# Patient Record
Sex: FEMALE | Race: WHITE | NOT HISPANIC OR LATINO | Employment: OTHER | URBAN - METROPOLITAN AREA
[De-identification: names, ages, dates, MRNs, and addresses within clinical notes are randomized per-mention and may not be internally consistent; named-entity substitution may affect disease eponyms.]

---

## 2017-02-13 DIAGNOSIS — F39 MOOD DISORDER (HCC): ICD-10-CM

## 2017-02-13 RX ORDER — CITALOPRAM 10 MG/1
TABLET ORAL
Qty: 30 TABLET | Refills: 0 | Status: SHIPPED | OUTPATIENT
Start: 2017-02-13 | End: 2017-03-13 | Stop reason: SDUPTHER

## 2017-03-13 DIAGNOSIS — F39 MOOD DISORDER (HCC): ICD-10-CM

## 2017-03-13 RX ORDER — CITALOPRAM 10 MG/1
TABLET ORAL
Qty: 30 TABLET | Refills: 0 | Status: SHIPPED | OUTPATIENT
Start: 2017-03-13 | End: 2017-04-10 | Stop reason: SDUPTHER

## 2017-04-10 ENCOUNTER — OFFICE VISIT (OUTPATIENT)
Dept: INTERNAL MEDICINE | Facility: CLINIC | Age: 77
End: 2017-04-10

## 2017-04-10 VITALS
BODY MASS INDEX: 21.83 KG/M2 | WEIGHT: 131 LBS | DIASTOLIC BLOOD PRESSURE: 84 MMHG | HEIGHT: 65 IN | SYSTOLIC BLOOD PRESSURE: 128 MMHG

## 2017-04-10 DIAGNOSIS — I10 ESSENTIAL HYPERTENSION: Primary | ICD-10-CM

## 2017-04-10 DIAGNOSIS — G89.29 CHRONIC PAIN OF RIGHT KNEE: ICD-10-CM

## 2017-04-10 DIAGNOSIS — E78.5 HYPERLIPIDEMIA, UNSPECIFIED HYPERLIPIDEMIA TYPE: ICD-10-CM

## 2017-04-10 DIAGNOSIS — M25.561 CHRONIC PAIN OF RIGHT KNEE: ICD-10-CM

## 2017-04-10 DIAGNOSIS — F39 MOOD DISORDER (HCC): ICD-10-CM

## 2017-04-10 DIAGNOSIS — F51.01 PRIMARY INSOMNIA: ICD-10-CM

## 2017-04-10 LAB
ALBUMIN SERPL-MCNC: 4.1 G/DL (ref 3.5–5.2)
ALBUMIN/GLOB SERPL: 1.3 G/DL
ALP SERPL-CCNC: 58 U/L (ref 39–117)
ALT SERPL-CCNC: 14 U/L (ref 1–33)
AST SERPL-CCNC: 22 U/L (ref 1–32)
BASOPHILS # BLD AUTO: 0.04 10*3/MM3 (ref 0–0.2)
BASOPHILS NFR BLD AUTO: 0.8 % (ref 0–2)
BILIRUB SERPL-MCNC: 0.8 MG/DL (ref 0.1–1.2)
BUN SERPL-MCNC: 20 MG/DL (ref 8–23)
BUN/CREAT SERPL: 26 (ref 7–25)
CALCIUM SERPL-MCNC: 10.2 MG/DL (ref 8.6–10.5)
CHLORIDE SERPL-SCNC: 99 MMOL/L (ref 98–107)
CHOLEST SERPL-MCNC: 239 MG/DL (ref 0–200)
CO2 SERPL-SCNC: 24.9 MMOL/L (ref 22–29)
CREAT SERPL-MCNC: 0.77 MG/DL (ref 0.57–1)
DEPRECATED RDW RBC AUTO: 41.9 FL (ref 37–54)
EOSINOPHIL # BLD AUTO: 0.14 10*3/MM3 (ref 0–0.7)
EOSINOPHIL NFR BLD AUTO: 3 % (ref 0–5)
ERYTHROCYTE [DISTWIDTH] IN BLOOD BY AUTOMATED COUNT: 12.2 % (ref 11.5–15)
GLOBULIN SER CALC-MCNC: 3.1 GM/DL
GLUCOSE SERPL-MCNC: 92 MG/DL (ref 65–99)
HCT VFR BLD AUTO: 39.4 % (ref 34.1–44.9)
HDLC SERPL-MCNC: 98 MG/DL (ref 40–60)
HGB BLD-MCNC: 12.9 G/DL (ref 11.2–15.7)
LDLC SERPL CALC-MCNC: 125 MG/DL (ref 0–100)
LYMPHOCYTES # BLD AUTO: 1.45 10*3/MM3 (ref 0.8–7)
LYMPHOCYTES NFR BLD AUTO: 30.7 % (ref 10–60)
MCH RBC QN AUTO: 31.5 PG (ref 26–34)
MCHC RBC AUTO-ENTMCNC: 32.7 G/DL (ref 31–37)
MCV RBC AUTO: 96.3 FL (ref 80–100)
MONOCYTES # BLD AUTO: 0.47 10*3/MM3 (ref 0–1)
MONOCYTES NFR BLD AUTO: 10 % (ref 0–13)
NEUTROPHILS # BLD AUTO: 2.62 10*3/MM3 (ref 1–11)
NEUTROPHILS NFR BLD AUTO: 55.5 % (ref 30–85)
PLATELET # BLD AUTO: 199 10*3/MM3 (ref 150–450)
PMV BLD AUTO: 12.8 FL (ref 6–12)
POTASSIUM SERPL-SCNC: 4.3 MMOL/L (ref 3.5–5.2)
PROT SERPL-MCNC: 7.2 G/DL (ref 6–8.5)
RBC # BLD AUTO: 4.09 10*6/MM3 (ref 3.93–5.22)
SODIUM SERPL-SCNC: 140 MMOL/L (ref 136–145)
TRIGL SERPL-MCNC: 78 MG/DL (ref 0–150)
VLDLC SERPL-MCNC: 15.6 MG/DL (ref 5–40)
WBC NRBC COR # BLD: 4.72 10*3/MM3 (ref 5–10)

## 2017-04-10 PROCEDURE — 99213 OFFICE O/P EST LOW 20 MIN: CPT | Performed by: INTERNAL MEDICINE

## 2017-04-10 PROCEDURE — 85025 COMPLETE CBC W/AUTO DIFF WBC: CPT | Performed by: INTERNAL MEDICINE

## 2017-04-10 RX ORDER — ACETAMINOPHEN 325 MG/1
650 TABLET ORAL EVERY 6 HOURS PRN
COMMUNITY
End: 2019-03-21

## 2017-04-10 RX ORDER — CITALOPRAM 10 MG/1
10 TABLET ORAL DAILY
Qty: 90 TABLET | Refills: 3 | Status: SHIPPED | OUTPATIENT
Start: 2017-04-10 | End: 2017-05-11 | Stop reason: SDUPTHER

## 2017-04-10 RX ORDER — IBUPROFEN 200 MG
200 TABLET ORAL EVERY 6 HOURS PRN
COMMUNITY
End: 2019-04-11 | Stop reason: HOSPADM

## 2017-04-10 NOTE — PROGRESS NOTES
"Subjective   Kristie Franz is a 76 y.o. female here for   Chief Complaint   Patient presents with   • Hyperlipidemia     8 month follow-up   • Hypertension     8 month follow-up   .    Vitals:    04/10/17 0901   BP: 128/84   BP Location: Left arm   Patient Position: Sitting   Cuff Size: Adult   Weight: 131 lb (59.4 kg)   Height: 65\" (165.1 cm)       Hyperlipidemia   This is a chronic problem. The current episode started more than 1 year ago. The problem is controlled. Recent lipid tests were reviewed and are normal. She has no history of chronic renal disease, diabetes or liver disease. Pertinent negatives include no chest pain or shortness of breath.   Hypertension   This is a chronic problem. The current episode started more than 1 year ago. The problem is unchanged. The problem is controlled. Pertinent negatives include no chest pain, palpitations, peripheral edema or shortness of breath. There is no history of chronic renal disease.        Encounter Diagnoses   Name Primary?   • Essential hypertension Yes   • Hyperlipidemia, unspecified hyperlipidemia type    • Primary insomnia    • Mood disorder    • Chronic pain of right knee        The following portions of the patient's history were reviewed and updated as appropriate: allergies, current medications, past social history and problem list.    Review of Systems   Constitutional: Positive for fatigue (off/on). Negative for chills and fever.   HENT: Positive for postnasal drip.    Respiratory: Negative for cough, shortness of breath and wheezing.    Cardiovascular: Negative for chest pain, palpitations and leg swelling.   Musculoskeletal: Positive for arthralgias (right knee pain - had shots per ortho).   Allergic/Immunologic: Positive for environmental allergies.   Psychiatric/Behavioral: Positive for sleep disturbance. Negative for dysphoric mood. The patient is not nervous/anxious.        Objective   Physical Exam   Constitutional: She appears well-developed " and well-nourished. No distress.   Cardiovascular: Normal rate, regular rhythm and normal heart sounds.    Pulmonary/Chest: No respiratory distress. She has no wheezes. She has no rales. She exhibits no tenderness.   Musculoskeletal: She exhibits no edema.   Psychiatric: She has a normal mood and affect. Her behavior is normal.   Nursing note and vitals reviewed.      Assessment/Plan   Problem List Items Addressed This Visit        Unprioritized    Hypertension - Primary    Relevant Orders    CBC Auto Differential    Comprehensive Metabolic Panel    Lipid Panel    Hyperlipidemia    Relevant Orders    Lipid Panel    Mood disorder    Relevant Medications    citalopram (CeleXA) 10 MG tablet    Insomnia    Chronic pain of right knee       HTN - need to bring machine to each appt.  Call if bp less than 110 systolic.   High chol - continue diet/exercise.   Knee exercises shown today.  Need followup with ortho.   Mood stable.  Call if problems.       Return for wellness in 3-4 mos.

## 2017-05-11 ENCOUNTER — TELEPHONE (OUTPATIENT)
Dept: INTERNAL MEDICINE | Facility: CLINIC | Age: 77
End: 2017-05-11

## 2017-05-11 DIAGNOSIS — F39 MOOD DISORDER (HCC): ICD-10-CM

## 2017-05-11 RX ORDER — CITALOPRAM 10 MG/1
10 TABLET ORAL DAILY
Qty: 90 TABLET | Refills: 3 | Status: SHIPPED | OUTPATIENT
Start: 2017-05-11 | End: 2018-03-20 | Stop reason: SDUPTHER

## 2017-05-19 ENCOUNTER — TELEPHONE (OUTPATIENT)
Dept: INTERNAL MEDICINE | Facility: CLINIC | Age: 77
End: 2017-05-19

## 2017-06-12 ENCOUNTER — TRANSCRIBE ORDERS (OUTPATIENT)
Dept: ADMINISTRATIVE | Facility: HOSPITAL | Age: 77
End: 2017-06-12

## 2017-06-12 DIAGNOSIS — Z13.9 SCREENING: Primary | ICD-10-CM

## 2017-07-06 ENCOUNTER — HOSPITAL ENCOUNTER (OUTPATIENT)
Dept: CARDIOLOGY | Facility: HOSPITAL | Age: 77
Discharge: HOME | End: 2017-07-06
Attending: INTERNAL MEDICINE | Admitting: INTERNAL MEDICINE

## 2017-07-06 VITALS
SYSTOLIC BLOOD PRESSURE: 146 MMHG | WEIGHT: 132 LBS | HEIGHT: 62 IN | DIASTOLIC BLOOD PRESSURE: 78 MMHG | HEART RATE: 56 BPM | BODY MASS INDEX: 24.29 KG/M2

## 2017-07-06 DIAGNOSIS — Z13.9 SCREENING: ICD-10-CM

## 2017-07-06 LAB
BH CV ECHO MEAS - DIST AO DIAM: 1.48 CM
BH CV VAS BP LEFT ARM: NORMAL MMHG
BH CV VAS BP RIGHT ARM: NORMAL MMHG
BH CV XLRA MEAS - MID AO DIAM: 1.79 CM
BH CV XLRA MEAS - PROX AO DIAM: 2.04 CM
BH CV XLRA MEAS LEFT ICA/CCA RATIO: 1.26
BH CV XLRA MEAS LEFT MID CCA PSV: NORMAL CM/SEC
BH CV XLRA MEAS LEFT MID ICA PSV: NORMAL CM/SEC
BH CV XLRA MEAS LEFT PROX ECA PSV: NORMAL CM/SEC
BH CV XLRA MEAS RIGHT ICA/CCA RATIO: 1.87
BH CV XLRA MEAS RIGHT MID CCA PSV: NORMAL CM/SEC
BH CV XLRA MEAS RIGHT MID ICA PSV: NORMAL CM/SEC
BH CV XLRA MEAS RIGHT PROX ECA PSV: NORMAL CM/SEC

## 2017-07-06 PROCEDURE — 93799 UNLISTED CV SVC/PROCEDURE: CPT

## 2017-08-24 ENCOUNTER — OFFICE VISIT (OUTPATIENT)
Dept: INTERNAL MEDICINE | Facility: CLINIC | Age: 77
End: 2017-08-24

## 2017-08-24 VITALS
WEIGHT: 133 LBS | DIASTOLIC BLOOD PRESSURE: 66 MMHG | SYSTOLIC BLOOD PRESSURE: 114 MMHG | HEIGHT: 62 IN | BODY MASS INDEX: 24.48 KG/M2

## 2017-08-24 DIAGNOSIS — Z00.00 MEDICARE ANNUAL WELLNESS VISIT, SUBSEQUENT: Primary | ICD-10-CM

## 2017-08-24 DIAGNOSIS — E78.5 HYPERLIPIDEMIA, UNSPECIFIED HYPERLIPIDEMIA TYPE: ICD-10-CM

## 2017-08-24 DIAGNOSIS — F39 MOOD DISORDER (HCC): ICD-10-CM

## 2017-08-24 DIAGNOSIS — M85.80 OSTEOPENIA: ICD-10-CM

## 2017-08-24 DIAGNOSIS — F51.01 PRIMARY INSOMNIA: ICD-10-CM

## 2017-08-24 DIAGNOSIS — J30.2 SEASONAL ALLERGIC RHINITIS, UNSPECIFIED ALLERGIC RHINITIS TRIGGER: ICD-10-CM

## 2017-08-24 DIAGNOSIS — I10 ESSENTIAL HYPERTENSION: ICD-10-CM

## 2017-08-24 PROCEDURE — 99213 OFFICE O/P EST LOW 20 MIN: CPT | Performed by: INTERNAL MEDICINE

## 2017-08-24 PROCEDURE — G0439 PPPS, SUBSEQ VISIT: HCPCS | Performed by: INTERNAL MEDICINE

## 2017-08-24 RX ORDER — TRIAMTERENE AND HYDROCHLOROTHIAZIDE 37.5; 25 MG/1; MG/1
1 TABLET ORAL DAILY
Qty: 90 TABLET | Refills: 1 | Status: SHIPPED | OUTPATIENT
Start: 2017-08-24 | End: 2018-08-23 | Stop reason: SDUPTHER

## 2017-08-24 NOTE — PROGRESS NOTES
"Subjective   Kristie Franz is a 76 y.o. female here for   Chief Complaint   Patient presents with   • Annual Exam     subsequent wellness exam   • Hypertension   • Hyperlipidemia   .    Vitals:    08/24/17 1416   BP: 114/66   Weight: 133 lb (60.3 kg)   Height: 62\" (157.5 cm)       Hypertension   This is a chronic problem. The current episode started more than 1 year ago. The problem is unchanged. The problem is controlled. Associated symptoms include anxiety and malaise/fatigue. Pertinent negatives include no chest pain, palpitations, peripheral edema or shortness of breath. There is no history of chronic renal disease.   Hyperlipidemia   This is a chronic problem. The current episode started more than 1 year ago. The problem is controlled. Recent lipid tests were reviewed and are normal. She has no history of chronic renal disease, diabetes, hypothyroidism, liver disease or obesity. Pertinent negatives include no chest pain or shortness of breath.        Encounter Diagnoses   Name Primary?   • Medicare annual wellness visit, subsequent Yes   • Hyperlipidemia, unspecified hyperlipidemia type    • Essential hypertension    • Mood disorder    • Primary insomnia    • Osteopenia    • Seasonal allergic rhinitis, unspecified allergic rhinitis trigger        The following portions of the patient's history were reviewed and updated as appropriate: allergies, current medications, past social history and problem list.    Review of Systems   Constitutional: Positive for fatigue and malaise/fatigue. Negative for chills and fever.   Respiratory: Negative for cough, shortness of breath and wheezing.    Cardiovascular: Negative for chest pain, palpitations and leg swelling.   Psychiatric/Behavioral: Positive for dysphoric mood and sleep disturbance (wakes up frequently ). The patient is nervous/anxious.        Objective   Physical Exam   Constitutional: She appears well-developed and well-nourished. No distress.   Cardiovascular: " Normal rate, regular rhythm and normal heart sounds.    Pulmonary/Chest: No respiratory distress. She has no wheezes. She has no rales. She exhibits no tenderness.   Musculoskeletal: She exhibits no edema.   Psychiatric: She has a normal mood and affect. Her behavior is normal.   Nursing note and vitals reviewed.      Assessment/Plan   Problem List Items Addressed This Visit        Unprioritized    Hypertension    Relevant Medications    triamterene-hydrochlorothiazide (MAXZIDE-25) 37.5-25 MG per tablet    Hyperlipidemia    Mood disorder    Allergic rhinitis    Osteopenia    Insomnia      Other Visit Diagnoses     Medicare annual wellness visit, subsequent    -  Primary         HTN stable - she wants to continue diuretic b/c leg swelling if she stops maxzide.  Ok to use maxzide qod.  Need yearly labs.   High chol - improved 4 mos ago - rechk 6 mos (on no meds).   Mood - grieving 's death.  She declines change in celexa.   Vit D=39 in the past.   Osteopenia - need bone density q 2 yrs.   Insomnia - ok with otc medication.  Call if worsening.     Wellness today.  Need Tdap if not done elsewhere (she is going to chk her records).   Need daily strengthening & balance exercises (shown today).   Need screening for AAA & carotid disease every other year (normal results last month).

## 2017-08-24 NOTE — PATIENT INSTRUCTIONS
Medicare Wellness  Personal Prevention Plan of Service     Date of Office Visit:  2017  Encounter Provider:  Marisol Broussard MD  Place of Service:  [facility]  Patient Name: Kristie Franz  :  1940    As part of the Medicare Wellness portion of your visit today, we are providing you with this personalized preventive plan of services (PPPS). This plan is based upon recommendations of the [place] Preventive Services Task Force (USPSTF) and the Advisory Committee on Immunization Practices (ACIP).    This lists the preventive care services that should be considered, and provides dates of when you are due. Items listed as completed are up-to-date and do not require any further intervention.    Health Maintenance   Topic Date Due   • TDAP/TD VACCINES (1 - Tdap) 1959   • INFLUENZA VACCINE  2017   • DXA SCAN  10/08/2017   • LIPID PANEL  04/10/2018   • MEDICARE ANNUAL WELLNESS  2018   • MAMMOGRAM  10/13/2018   • COLONOSCOPY  2019   • PNEUMOCOCCAL VACCINES (65+ LOW/MEDIUM RISK)  Completed   • ZOSTER VACCINE  Completed       No orders of the defined types were placed in this encounter.      No Follow-up on file.

## 2017-08-24 NOTE — PROGRESS NOTES
QUICK REFERENCE INFORMATION:  The ABCs of the Annual Wellness Visit    Subsequent Medicare Wellness Visit    HEALTH RISK ASSESSMENT    1940    Recent Hospitalizations:  No hospitalization(s) within the last year..        Current Medical Providers:  Patient Care Team:  Marisol Broussard MD as PCP - General (Internal Medicine)  Marisol Broussard MD as PCP - Claims Attributed  Kristie Wallace MD (Dermatology)  Gennaro Yeung MD as Consulting Physician (Orthopedic Surgery)        Smoking Status:  History   Smoking Status   • Never Smoker   Smokeless Tobacco   • Never Used       Alcohol Consumption:  History   Alcohol Use   • Yes       Depression Screen:   PHQ-9 Depression Screening 8/24/2017   Little interest or pleasure in doing things (No Data)   Feeling down, depressed, or hopeless 1   Trouble falling or staying asleep, or sleeping too much 1   Feeling tired or having little energy 1   Poor appetite or overeating 0   Feeling bad about yourself - or that you are a failure or have let yourself or your family down 1   Trouble concentrating on things, such as reading the newspaper or watching television 1   Moving or speaking so slowly that other people could have noticed. Or the opposite - being so fidgety or restless that you have been moving around a lot more than usual 0   Thoughts that you would be better off dead, or of hurting yourself in some way 0   PHQ-9 Total Score 5   If you checked off any problems, how difficult have these problems made it for you to do your work, take care of things at home, or get along with other people? Somewhat difficult       Health Habits and Functional and Cognitive Screening:  Functional & Cognitive Status 8/24/2017   Do you have difficulty preparing food and eating? No   Do you have difficulty bathing yourself? No   Do you have difficulty getting dressed? No   Do you have difficulty using the toilet? No   Do you have difficulty moving around from place to place?  No   In the past year have you fallen or experienced a near fall? No   Do you need help using the phone?  No   Are you deaf or do you have serious difficulty hearing?  No   Do you need help with transportation? No   Do you need help shopping? No   Do you need help preparing meals?  No   Do you need help with housework?  No   Do you need help with laundry? No   Do you need help taking your medications? No   Do you need help managing money? No   Do you have difficulty concentrating, remembering or making decisions? No       Health Habits  Current Diet: Well Balanced Diet  Dental Exam: Up to date  Eye Exam: Up to date  Exercise (times per week): 3 times per week  Current Exercise Activities Include: Walking      Does the patient have evidence of cognitive impairment? No    Aspirin use counseling: Does not need ASA (and currently is not on it)      Recent Lab Results:  CMP:  Lab Results   Component Value Date    GLU 92 04/10/2017    BUN 20 04/10/2017    CREATININE 0.77 04/10/2017    EGFRIFNONA 73 04/10/2017    EGFRIFAFRI 88 04/10/2017    BCR 26.0 (H) 04/10/2017     04/10/2017    K 4.3 04/10/2017    CO2 24.9 04/10/2017    CALCIUM 10.2 04/10/2017    PROTENTOTREF 7.2 04/10/2017    ALBUMIN 4.10 04/10/2017    LABGLOBREF 3.1 04/10/2017    LABIL2 1.3 04/10/2017    BILITOT 0.8 04/10/2017    ALKPHOS 58 04/10/2017    AST 22 04/10/2017    ALT 14 04/10/2017     Lipid Panel:  Lab Results   Component Value Date    CHOL 250 (H) 10/13/2016    TRIG 78 04/10/2017    HDL 98 (H) 04/10/2017    VLDL 15.6 04/10/2017    LDLCALC 137 (H) 10/13/2016    LDLHDL 1.35 10/13/2016     HbA1c:       Visual Acuity:  No exam data present    Age-appropriate Screening Schedule:  Refer to the list below for future screening recommendations based on patient's age, sex and/or medical conditions. Orders for these recommended tests are listed in the plan section. The patient has been provided with a written plan.    Health Maintenance   Topic Date Due   •  TDAP/TD VACCINES (1 - Tdap) 12/20/1959   • INFLUENZA VACCINE  09/01/2017   • DXA SCAN  10/08/2017   • LIPID PANEL  04/10/2018   • MAMMOGRAM  10/13/2018   • COLONOSCOPY  02/02/2019   • PNEUMOCOCCAL VACCINES (65+ LOW/MEDIUM RISK)  Completed   • ZOSTER VACCINE  Completed        Subjective   History of Present Illness    Kristie Franz is a 76 y.o. female who presents for an Subsequent Wellness Visit.    The following portions of the patient's history were reviewed and updated as appropriate: allergies, current medications, past family history, past medical history, past social history, past surgical history and problem list.    Outpatient Medications Prior to Visit   Medication Sig Dispense Refill   • acetaminophen (TYLENOL) 325 MG tablet Take 650 mg by mouth Every 6 (Six) Hours As Needed for Mild Pain (1-3).     • BIOTIN 5000 PO Take  by mouth.     • calcium carbonate (OS-CHARLIE) 600 MG tablet Take 600 mg by mouth daily.     • cholecalciferol (VITAMIN D3) 1000 UNITS tablet Take 1,000 Units by mouth daily.     • citalopram (CeleXA) 10 MG tablet Take 1 tablet by mouth Daily. 90 tablet 3   • guaiFENesin (MUCINEX) 600 MG 12 hr tablet Take 1,200 mg by mouth 2 (two) times a day.     • ibuprofen (ADVIL,MOTRIN) 200 MG tablet Take 200 mg by mouth Every 6 (Six) Hours As Needed for Mild Pain (1-3).     • Multiple Vitamin (MULTI VITAMIN DAILY PO) Take 1 tablet by mouth Daily.     • Omega-3 Fatty Acids (FISH OIL) 1000 MG capsule capsule Take  by mouth daily with breakfast.     • rizatriptan MLT (MAXALT-MLT) 10 MG disintegrating tablet Take by mouth once as needed for migraine. May repeat in 2 hours if needed     • Travoprost (TRAVATAN Z OP) Apply 1 drop to eye Daily.     • triamterene-hydrochlorothiazide (MAXZIDE-25) 37.5-25 MG per tablet TAKE ONE TABLET BY MOUTH DAILY 90 tablet 1     No facility-administered medications prior to visit.        Patient Active Problem List   Diagnosis   • Hypertension   • Hyperlipidemia   • Mood  "disorder   • Allergic rhinitis   • Osteopenia   • Insomnia   • Chronic pain of right knee       Advance Care Planning:  has NO advance directive - information provided to the patient today    Identification of Risk Factors:  Risk factors include: increased fall risk.    Review of Systems    Compared to one year ago, the patient feels her physical health is the same.  Compared to one year ago, the patient feels her mental health is the same.    Objective     Physical Exam    Vitals:    08/24/17 1416   BP: 114/66   Weight: 133 lb (60.3 kg)   Height: 62\" (157.5 cm)       Body mass index is 24.33 kg/(m^2).  Discussed the patient's BMI with her. The BMI is in the acceptable range.    Assessment/Plan   Patient Self-Management and Personalized Health Advice  The patient has been provided with information about: diet, exercise, weight management, prevention of cardiac or vascular disease, the relationship between weight and GERD, fall prevention, designing advance directives and mental health concerns and preventive services including:   · Advance directive, Counseling for cardiovascular disease risk reduction, Diabetes screening, see lab orders, Exercise counseling provided, Fall Risk assessment done, Fall Risk plan of care done, Nutrition counseling provided.    Visit Diagnoses:    ICD-10-CM ICD-9-CM   1. Medicare annual wellness visit, subsequent Z00.00 V70.0   2. Hyperlipidemia, unspecified hyperlipidemia type E78.5 272.4   3. Essential hypertension I10 401.9   4. Mood disorder F39 296.90   5. Primary insomnia F51.01 307.42   6. Osteopenia M85.80 733.90   7. Seasonal allergic rhinitis, unspecified allergic rhinitis trigger J30.2 477.9       No orders of the defined types were placed in this encounter.      Outpatient Encounter Prescriptions as of 8/24/2017   Medication Sig Dispense Refill   • acetaminophen (TYLENOL) 325 MG tablet Take 650 mg by mouth Every 6 (Six) Hours As Needed for Mild Pain (1-3).     • BIOTIN 5000 " PO Take  by mouth.     • calcium carbonate (OS-CHARLIE) 600 MG tablet Take 600 mg by mouth daily.     • cholecalciferol (VITAMIN D3) 1000 UNITS tablet Take 1,000 Units by mouth daily.     • citalopram (CeleXA) 10 MG tablet Take 1 tablet by mouth Daily. 90 tablet 3   • guaiFENesin (MUCINEX) 600 MG 12 hr tablet Take 1,200 mg by mouth 2 (two) times a day.     • ibuprofen (ADVIL,MOTRIN) 200 MG tablet Take 200 mg by mouth Every 6 (Six) Hours As Needed for Mild Pain (1-3).     • Multiple Vitamin (MULTI VITAMIN DAILY PO) Take 1 tablet by mouth Daily.     • Omega-3 Fatty Acids (FISH OIL) 1000 MG capsule capsule Take  by mouth daily with breakfast.     • rizatriptan MLT (MAXALT-MLT) 10 MG disintegrating tablet Take by mouth once as needed for migraine. May repeat in 2 hours if needed     • Travoprost (TRAVATAN Z OP) Apply 1 drop to eye Daily.     • triamterene-hydrochlorothiazide (MAXZIDE-25) 37.5-25 MG per tablet TAKE ONE TABLET BY MOUTH DAILY 90 tablet 1     No facility-administered encounter medications on file as of 8/24/2017.        Reviewed use of high risk medication in the elderly: yes  Reviewed for potential of harmful drug interactions in the elderly: yes    Follow Up:  Return in about 6 months (around 2/24/2018) for Recheck.     An After Visit Summary and PPPS with all of these plans were given to the patient.       Need to avoid dehydration (taking diuretic).

## 2017-08-28 ENCOUNTER — TELEPHONE (OUTPATIENT)
Dept: INTERNAL MEDICINE | Facility: CLINIC | Age: 77
End: 2017-08-28

## 2017-08-28 NOTE — TELEPHONE ENCOUNTER
----- Message from Seda Cao sent at 8/28/2017  1:38 PM EDT -----  Contact: pt - Dr Broussard's pt - RE: vacc  Pt calling and would like to inform that pt rec'd Tdap @ [facility] 10/04/2015 and Prevnar 13 / pneumonia was rec'd in our office 11/01/2016. Could you please document pt's chart accordingly? Please advise. Thanks      Pt # [phone]

## 2017-10-20 DIAGNOSIS — Z13.820 SCREENING FOR OSTEOPOROSIS: ICD-10-CM

## 2017-10-20 DIAGNOSIS — Z12.31 ENCOUNTER FOR SCREENING MAMMOGRAM FOR BREAST CANCER: Primary | ICD-10-CM

## 2017-10-20 DIAGNOSIS — Z78.0 POST-MENOPAUSAL: ICD-10-CM

## 2017-10-24 ENCOUNTER — OFFICE VISIT (OUTPATIENT)
Dept: INTERNAL MEDICINE | Facility: CLINIC | Age: 77
End: 2017-10-24

## 2017-10-24 ENCOUNTER — APPOINTMENT (OUTPATIENT)
Dept: WOMENS IMAGING | Facility: HOSPITAL | Age: 77
End: 2017-10-24

## 2017-10-24 ENCOUNTER — CLINICAL SUPPORT (OUTPATIENT)
Dept: INTERNAL MEDICINE | Facility: CLINIC | Age: 77
End: 2017-10-24

## 2017-10-24 DIAGNOSIS — Z78.0 POST-MENOPAUSAL: ICD-10-CM

## 2017-10-24 DIAGNOSIS — Z12.31 ENCOUNTER FOR SCREENING MAMMOGRAM FOR BREAST CANCER: ICD-10-CM

## 2017-10-24 DIAGNOSIS — Z23 NEED FOR IMMUNIZATION AGAINST INFLUENZA: Primary | ICD-10-CM

## 2017-10-24 PROCEDURE — G0202 SCR MAMMO BI INCL CAD: HCPCS | Performed by: INTERNAL MEDICINE

## 2017-10-24 PROCEDURE — 90662 IIV NO PRSV INCREASED AG IM: CPT | Performed by: INTERNAL MEDICINE

## 2017-10-24 PROCEDURE — G0008 ADMIN INFLUENZA VIRUS VAC: HCPCS | Performed by: INTERNAL MEDICINE

## 2017-10-24 PROCEDURE — 77080 DXA BONE DENSITY AXIAL: CPT | Performed by: INTERNAL MEDICINE

## 2017-10-24 PROCEDURE — G0202 SCR MAMMO BI INCL CAD: HCPCS | Performed by: RADIOLOGY

## 2017-11-08 ENCOUNTER — APPOINTMENT (OUTPATIENT)
Dept: WOMENS IMAGING | Facility: HOSPITAL | Age: 77
End: 2017-11-08

## 2017-11-08 ENCOUNTER — OFFICE VISIT (OUTPATIENT)
Dept: INTERNAL MEDICINE | Facility: CLINIC | Age: 77
End: 2017-11-08

## 2017-11-08 VITALS
OXYGEN SATURATION: 96 % | HEART RATE: 73 BPM | DIASTOLIC BLOOD PRESSURE: 80 MMHG | WEIGHT: 124 LBS | SYSTOLIC BLOOD PRESSURE: 118 MMHG | BODY MASS INDEX: 22.68 KG/M2

## 2017-11-08 DIAGNOSIS — M25.562 ACUTE PAIN OF LEFT KNEE: Primary | ICD-10-CM

## 2017-11-08 PROCEDURE — 73560 X-RAY EXAM OF KNEE 1 OR 2: CPT | Performed by: RADIOLOGY

## 2017-11-08 PROCEDURE — 73560 X-RAY EXAM OF KNEE 1 OR 2: CPT | Performed by: NURSE PRACTITIONER

## 2017-11-08 PROCEDURE — 99213 OFFICE O/P EST LOW 20 MIN: CPT | Performed by: NURSE PRACTITIONER

## 2017-11-08 NOTE — PATIENT INSTRUCTIONS
Knee Pain  Knee pain is a very common symptom and can have many causes. Knee pain often goes away when you follow your health care provider's instructions for relieving pain and discomfort at home. However, knee pain can develop into a condition that needs treatment. Some conditions may include:  · Arthritis caused by wear and tear (osteoarthritis).  · Arthritis caused by swelling and irritation (rheumatoid arthritis or gout).  · A cyst or growth in your knee.  · An infection in your knee joint.  · An injury that will not heal.  · Damage, swelling, or irritation of the tissues that support your knee (torn ligaments or tendinitis).  If your knee pain continues, additional tests may be ordered to diagnose your condition. Tests may include X-rays or other imaging studies of your knee. You may also need to have fluid removed from your knee. Treatment for ongoing knee pain depends on the cause, but treatment may include:  · Medicines to relieve pain or swelling.  · Steroid injections in your knee.  · Physical therapy.  · Surgery.  HOME CARE INSTRUCTIONS  · Take medicines only as directed by your health care provider.  · Rest your knee and keep it raised (elevated) while you are resting.  · Do not do things that cause or worsen pain.  · Avoid high-impact activities or exercises, such as running, jumping rope, or doing jumping jacks.  · Apply ice to the knee area:    Put ice in a plastic bag.    Place a towel between your skin and the bag.    Leave the ice on for 20 minutes, 2-3 times a day.  · Ask your health care provider if you should wear an elastic knee support.  · Keep a pillow under your knee when you sleep.  · Lose weight if you are overweight. Extra weight can put pressure on your knee.  · Do not use any tobacco products, including cigarettes, chewing tobacco, or electronic cigarettes. If you need help quitting, ask your health care provider. Smoking may slow the healing of any bone and joint problems that you may  have.  SEEK MEDICAL CARE IF:  · Your knee pain continues, changes, or gets worse.  · You have a fever along with knee pain.  · Your knee sergio or locks up.  · Your knee becomes more swollen.  SEEK IMMEDIATE MEDICAL CARE IF:   · Your knee joint feels hot to the touch.  · You have chest pain or trouble breathing.     This information is not intended to replace advice given to you by your health care provider. Make sure you discuss any questions you have with your health care provider.     Document Released: 10/14/2008 Document Revised: 01/08/2016 Document Reviewed: 08/03/2015  [employer] Interactive Patient Education ©2017 [employer]

## 2017-11-08 NOTE — PROGRESS NOTES
Subjective   Kristie Franz is a 76 y.o. female.     HPI Comments: She was at yoga last week, which she suspects caused her pain. She was doing more aggressive stretches on her knees.     Leg Pain    The incident occurred 5 to 7 days ago. The injury mechanism is unknown. The pain is present in the left knee (left shin ). The quality of the pain is described as aching. The pain is at a severity of 8/10. The pain is moderate. The pain has been fluctuating since onset. Pertinent negatives include no numbness or tingling. The symptoms are aggravated by movement (going up stairs, bending down ). She has tried ice, heat, NSAIDs and acetaminophen for the symptoms. The treatment provided moderate relief.        The following portions of the patient's history were reviewed and updated as appropriate: allergies, current medications, past family history, past medical history, past social history, past surgical history and problem list.    Review of Systems   Constitutional: Negative for activity change, appetite change, fatigue and fever.   Respiratory: Negative for cough.    Cardiovascular: Negative for chest pain and leg swelling.   Musculoskeletal: Positive for arthralgias (left knee, acute/right knee, chronic ) and back pain (lower back, chronic ). Negative for joint swelling.   Neurological: Negative for tingling and numbness.       Objective   Physical Exam   Constitutional: She is oriented to person, place, and time. She appears well-developed and well-nourished.   HENT:   Head: Normocephalic.   Nose: Nose normal.   Cardiovascular: Regular rhythm and normal heart sounds.  Exam reveals no S3 and no S4.    No murmur heard.  Pulmonary/Chest: Effort normal and breath sounds normal. She has no decreased breath sounds. She has no wheezes. She has no rhonchi. She has no rales.   Musculoskeletal: She exhibits no edema.        Right knee: She exhibits decreased range of motion. She exhibits no swelling. Tenderness found.         Left knee: She exhibits decreased range of motion. She exhibits no swelling and no erythema. Tenderness found. Medial joint line tenderness noted.   Pain with knee flexion    Neurological: She is alert and oriented to person, place, and time. Gait normal.   Reflex Scores:       Patellar reflexes are 2+ on the right side and 2+ on the left side.  Skin: Skin is warm and dry.   Psychiatric: She has a normal mood and affect.       Assessment/Plan   Kristie was seen today for leg pain.    Diagnoses and all orders for this visit:    Acute pain of left knee  Comments:  apply ice and take ibuprofen; apply knee brace   Orders:  -     XR Knee AP & Lateral  -     Ambulatory Referral to Physical Therapy Evaluate and treat      Xray with no acute findings. No comparison film available. Sent for final review. Will start PT and if no relief will obtain further imaging. She was instructed if she restarts yoga to modify her activities.

## 2017-11-10 ENCOUNTER — TELEPHONE (OUTPATIENT)
Dept: INTERNAL MEDICINE | Facility: CLINIC | Age: 77
End: 2017-11-10

## 2017-11-10 NOTE — TELEPHONE ENCOUNTER
I called patient to inform her of xray results of knee (degenerative changes, bakers cyst and calcifications). She reports her knee isn't any worst. She would like to have referral for [facility]. She was able to get appt for next week. She would like to hold on ortho referral at this time. May need to consider further imaging.

## 2017-12-07 ENCOUNTER — TELEPHONE (OUTPATIENT)
Dept: INTERNAL MEDICINE | Facility: CLINIC | Age: 77
End: 2017-12-07

## 2017-12-07 NOTE — TELEPHONE ENCOUNTER
----- Message from Erlinda Bourne sent at 12/7/2017 12:40 PM EST -----  Contact: Patient  Patient called in requesting Dr Broussard's opinion if she should go see the orthopaedist again since she is still in pain after having physical therapy. Please advise.    Pt# [phone]

## 2017-12-12 NOTE — TELEPHONE ENCOUNTER
Ms. Franz states she is going to see Dr. Majano at [facility] on Monday. Nothing is needed on our end.    Thank you

## 2018-03-20 ENCOUNTER — OFFICE VISIT (OUTPATIENT)
Dept: INTERNAL MEDICINE | Facility: CLINIC | Age: 78
End: 2018-03-20

## 2018-03-20 VITALS
HEIGHT: 62 IN | SYSTOLIC BLOOD PRESSURE: 118 MMHG | BODY MASS INDEX: 23 KG/M2 | DIASTOLIC BLOOD PRESSURE: 78 MMHG | WEIGHT: 125 LBS

## 2018-03-20 DIAGNOSIS — F39 MOOD DISORDER (HCC): ICD-10-CM

## 2018-03-20 DIAGNOSIS — G89.29 CHRONIC PAIN OF RIGHT KNEE: ICD-10-CM

## 2018-03-20 DIAGNOSIS — E78.49 OTHER HYPERLIPIDEMIA: ICD-10-CM

## 2018-03-20 DIAGNOSIS — F51.01 PRIMARY INSOMNIA: ICD-10-CM

## 2018-03-20 DIAGNOSIS — M25.561 CHRONIC PAIN OF RIGHT KNEE: ICD-10-CM

## 2018-03-20 DIAGNOSIS — I10 ESSENTIAL HYPERTENSION: Primary | ICD-10-CM

## 2018-03-20 DIAGNOSIS — R53.82 CHRONIC FATIGUE: ICD-10-CM

## 2018-03-20 DIAGNOSIS — R35.1 NOCTURIA: ICD-10-CM

## 2018-03-20 DIAGNOSIS — J30.1 CHRONIC ALLERGIC RHINITIS DUE TO POLLEN, UNSPECIFIED SEASONALITY: ICD-10-CM

## 2018-03-20 LAB
ALBUMIN SERPL-MCNC: 4.5 G/DL (ref 3.5–5.2)
ALBUMIN/GLOB SERPL: 1.7 G/DL
ALP SERPL-CCNC: 67 U/L (ref 39–117)
ALT SERPL W P-5'-P-CCNC: 17 U/L (ref 1–33)
ANION GAP SERPL CALCULATED.3IONS-SCNC: 11.8 MMOL/L
AST SERPL-CCNC: 23 U/L (ref 1–32)
BACTERIA UR QL AUTO: ABNORMAL /HPF
BASOPHILS # BLD AUTO: 0.04 10*3/MM3 (ref 0–0.2)
BASOPHILS NFR BLD AUTO: 0.8 % (ref 0–2)
BILIRUB SERPL-MCNC: 1 MG/DL (ref 0.1–1.2)
BILIRUB UR QL STRIP: NEGATIVE
BUN BLD-MCNC: 16 MG/DL (ref 8–23)
BUN/CREAT SERPL: 20 (ref 7–25)
CALCIUM SPEC-SCNC: 9.6 MG/DL (ref 8.6–10.5)
CHLORIDE SERPL-SCNC: 100 MMOL/L (ref 98–107)
CHOLEST SERPL-MCNC: 246 MG/DL (ref 0–200)
CLARITY UR: CLEAR
CO2 SERPL-SCNC: 29.2 MMOL/L (ref 22–29)
COLOR UR: YELLOW
CREAT BLD-MCNC: 0.8 MG/DL (ref 0.57–1)
DEPRECATED RDW RBC AUTO: 43.3 FL (ref 37–54)
EOSINOPHIL # BLD AUTO: 0.17 10*3/MM3 (ref 0–0.7)
EOSINOPHIL NFR BLD AUTO: 3.6 % (ref 0–5)
ERYTHROCYTE [DISTWIDTH] IN BLOOD BY AUTOMATED COUNT: 12.7 % (ref 11.5–15)
GFR SERPL CREATININE-BSD FRML MDRD: 70 ML/MIN/1.73
GLOBULIN UR ELPH-MCNC: 2.7 GM/DL
GLUCOSE BLD-MCNC: 95 MG/DL (ref 65–99)
GLUCOSE UR STRIP-MCNC: NEGATIVE MG/DL
HCT VFR BLD AUTO: 41 % (ref 34.1–44.9)
HDLC SERPL-MCNC: 99 MG/DL (ref 40–60)
HGB BLD-MCNC: 13.6 G/DL (ref 11.2–15.7)
HGB UR QL STRIP.AUTO: ABNORMAL
HYALINE CASTS UR QL AUTO: ABNORMAL /LPF
KETONES UR QL STRIP: NEGATIVE
LDLC SERPL CALC-MCNC: 132 MG/DL (ref 0–100)
LDLC/HDLC SERPL: 1.34 {RATIO}
LEUKOCYTE ESTERASE UR QL STRIP.AUTO: ABNORMAL
LYMPHOCYTES # BLD AUTO: 1.58 10*3/MM3 (ref 0.8–7)
LYMPHOCYTES NFR BLD AUTO: 33.1 % (ref 10–60)
MCH RBC QN AUTO: 31.8 PG (ref 26–34)
MCHC RBC AUTO-ENTMCNC: 33.2 G/DL (ref 31–37)
MCV RBC AUTO: 95.8 FL (ref 80–100)
MONOCYTES # BLD AUTO: 0.47 10*3/MM3 (ref 0–1)
MONOCYTES NFR BLD AUTO: 9.9 % (ref 0–13)
MUCOUS THREADS URNS QL MICRO: ABNORMAL /HPF
NEUTROPHILS # BLD AUTO: 2.51 10*3/MM3 (ref 1–11)
NEUTROPHILS NFR BLD AUTO: 52.6 % (ref 30–85)
NITRITE UR QL STRIP: NEGATIVE
PH UR STRIP.AUTO: 7.5 [PH] (ref 5–8)
PLATELET # BLD AUTO: 212 10*3/MM3 (ref 150–450)
PMV BLD AUTO: 13.1 FL (ref 6–12)
POTASSIUM BLD-SCNC: 4.2 MMOL/L (ref 3.5–5.2)
PROT SERPL-MCNC: 7.2 G/DL (ref 6–8.5)
PROT UR QL STRIP: NEGATIVE
RBC # BLD AUTO: 4.28 10*6/MM3 (ref 3.93–5.22)
RBC # UR: ABNORMAL /HPF
REF LAB TEST METHOD: ABNORMAL
SODIUM BLD-SCNC: 141 MMOL/L (ref 136–145)
SP GR UR STRIP: 1.01 (ref 1–1.03)
SQUAMOUS #/AREA URNS HPF: ABNORMAL /HPF
TRIGL SERPL-MCNC: 74 MG/DL (ref 0–150)
TSH SERPL DL<=0.05 MIU/L-ACNC: 4.75 MIU/ML (ref 0.27–4.2)
UROBILINOGEN UR QL STRIP: ABNORMAL
VLDLC SERPL-MCNC: 14.8 MG/DL (ref 5–40)
WBC NRBC COR # BLD: 4.77 10*3/MM3 (ref 5–10)
WBC UR QL AUTO: ABNORMAL /HPF

## 2018-03-20 PROCEDURE — 80053 COMPREHEN METABOLIC PANEL: CPT | Performed by: INTERNAL MEDICINE

## 2018-03-20 PROCEDURE — 80061 LIPID PANEL: CPT | Performed by: INTERNAL MEDICINE

## 2018-03-20 PROCEDURE — 85025 COMPLETE CBC W/AUTO DIFF WBC: CPT | Performed by: INTERNAL MEDICINE

## 2018-03-20 PROCEDURE — 84443 ASSAY THYROID STIM HORMONE: CPT | Performed by: INTERNAL MEDICINE

## 2018-03-20 PROCEDURE — 36415 COLL VENOUS BLD VENIPUNCTURE: CPT | Performed by: INTERNAL MEDICINE

## 2018-03-20 PROCEDURE — 81001 URINALYSIS AUTO W/SCOPE: CPT | Performed by: INTERNAL MEDICINE

## 2018-03-20 PROCEDURE — 99214 OFFICE O/P EST MOD 30 MIN: CPT | Performed by: INTERNAL MEDICINE

## 2018-03-20 RX ORDER — CITALOPRAM 20 MG/1
20 TABLET ORAL DAILY
Qty: 90 TABLET | Refills: 1 | Status: SHIPPED | OUTPATIENT
Start: 2018-03-20 | End: 2018-10-18 | Stop reason: SDUPTHER

## 2018-03-20 RX ORDER — SODIUM PHOSPHATE,MONO-DIBASIC 19G-7G/118
1 ENEMA (ML) RECTAL DAILY
COMMUNITY
End: 2019-03-21

## 2018-03-20 NOTE — PROGRESS NOTES
"Subjective   Kristie Franz is a 77 y.o. female here for   Chief Complaint   Patient presents with   • Hypertension   • Hyperlipidemia   • Allergic Rhinitis   • Knee Pain   .    Vitals:    03/20/18 1003 03/20/18 1738   BP: 118/86 118/78   BP Location: Left arm    Patient Position: Sitting    Cuff Size: Adult    Weight: 56.7 kg (125 lb)    Height: 157.5 cm (62\")        Body mass index is 22.86 kg/m².    Hypertension   This is a chronic problem. The current episode started more than 1 year ago. The problem is unchanged. The problem is controlled. Pertinent negatives include no chest pain, palpitations or shortness of breath.   Hyperlipidemia   This is a chronic problem. The current episode started more than 1 year ago. The problem is controlled. Recent lipid tests were reviewed and are normal. She has no history of diabetes. Pertinent negatives include no chest pain or shortness of breath.   Allergic Rhinitis   Presents for follow-up visit. She complains of fatigue. She reports no cough, fever or wheezing. The problem occurs intermittently. Timing of symptoms is intermittent. Symptom severity has been mild.   Knee Pain    The incident occurred more than 1 week ago. There was no injury mechanism. The quality of the pain is described as aching. The pain is moderate. The pain has been worsening since onset.   Enuresis   This is a chronic problem. The current episode started more than 1 year ago. The problem occurs constantly. The problem has been gradually worsening. Associated symptoms include arthralgias (knee pains) and fatigue. Pertinent negatives include no chest pain, chills, coughing or fever.        The following portions of the patient's history were reviewed and updated as appropriate: allergies, current medications, past social history and problem list.    Review of Systems   Constitutional: Positive for fatigue. Negative for chills and fever.   Respiratory: Negative for cough, shortness of breath and wheezing. "    Cardiovascular: Negative for chest pain, palpitations and leg swelling.   Genitourinary: Positive for enuresis.   Musculoskeletal: Positive for arthralgias (knee pains).   Psychiatric/Behavioral: Positive for dysphoric mood and sleep disturbance. The patient is not nervous/anxious.        Objective   Physical Exam   Constitutional: She appears well-developed and well-nourished. No distress.   Cardiovascular: Normal rate, regular rhythm and normal heart sounds.    Pulmonary/Chest: No respiratory distress. She has no wheezes. She has no rales. She exhibits no tenderness.   Musculoskeletal: She exhibits no edema.   Psychiatric: She has a normal mood and affect. Her behavior is normal.   Nursing note and vitals reviewed.      Assessment/Plan   Diagnoses and all orders for this visit:    Essential hypertension  Comments:  low bp - trial of stopping diuretic - call if bp over 140/90  Orders:  -     Comprehensive Metabolic Panel; Future  -     CBC Auto Differential; Future  -     Lipid Panel; Future  -     Urinalysis With / Microscopic If Indicated - Urine, Clean Catch; Future  -     Comprehensive Metabolic Panel  -     CBC Auto Differential  -     Lipid Panel  -     Urinalysis With / Microscopic If Indicated - Urine, Clean Catch  -     Scan Slide; Future  -     Scan Slide  -     Urinalysis, Microscopic Only - Urine, Clean Catch; Future  -     Urinalysis, Microscopic Only - Urine, Clean Catch    Mood disorder  Comments:  worse since   2017 - trial of increased celexa 20mg daily - call if problems persist  Orders:  -     citalopram (CeleXA) 20 MG tablet; Take 1 tablet by mouth Daily.    Primary insomnia  Comments:  call if sx persist    Chronic pain of right knee  Comments:  f/u with ortho    Chronic allergic rhinitis due to pollen, unspecified seasonality  Comments:  need daily med    Nocturia  Comments:  worsening - trial of nightly myrbetriq  Orders:  -     Mirabegron ER (MYRBETRIQ) 25 MG tablet  sustained-release 24 hour 24 hr tablet; Take 1 tablet by mouth Daily.    Other hyperlipidemia  Comments:  continue diet/ex  Orders:  -     Comprehensive Metabolic Panel; Future  -     Lipid Panel; Future  -     Comprehensive Metabolic Panel  -     Lipid Panel    Chronic fatigue  Comments:  b/c insomnia/nocturia - trial of myrbetriq - need labs today -call if sx persist  Orders:  -     TSH; Future  -     TSH    Other orders  -     glucosamine-chondroitin 500-400 MG capsule capsule; Take 1 capsule by mouth Daily.

## 2018-03-21 LAB
LARGE PLATELETS: NORMAL
RBC MORPH BLD: NORMAL
SMALL PLATELETS BLD QL SMEAR: ADEQUATE
WBC MORPH BLD: NORMAL

## 2018-03-22 DIAGNOSIS — E03.9 ACQUIRED HYPOTHYROIDISM: Primary | ICD-10-CM

## 2018-03-22 RX ORDER — LEVOTHYROXINE SODIUM 0.05 MG/1
50 TABLET ORAL DAILY
Qty: 30 TABLET | Refills: 4 | Status: SHIPPED | OUTPATIENT
Start: 2018-03-22 | End: 2018-08-13 | Stop reason: SDUPTHER

## 2018-03-23 ENCOUNTER — TELEPHONE (OUTPATIENT)
Dept: INTERNAL MEDICINE | Facility: CLINIC | Age: 78
End: 2018-03-23

## 2018-03-23 DIAGNOSIS — R31.21 ASYMPTOMATIC MICROSCOPIC HEMATURIA: Primary | ICD-10-CM

## 2018-03-23 DIAGNOSIS — E03.9 ACQUIRED HYPOTHYROIDISM: ICD-10-CM

## 2018-03-23 NOTE — TELEPHONE ENCOUNTER
----- Message from Jackie Palencia sent at 3/23/2018 12:40 PM EDT -----  Contact: pt  Pt is calling regarding her recent lab work for thyroid , her letter stated that she would be put on new medication.  She would like a call back as she had some questions regarding the medication.    Please advise.    Pt#Phone:  H:[phone]

## 2018-03-23 NOTE — TELEPHONE ENCOUNTER
Patient would like to speak further to you in regards to you in regards to her TSH lab results and starting Levothyroxine 50 mcg.    Please advise.    Thank you,    Von

## 2018-06-25 ENCOUNTER — OFFICE VISIT (OUTPATIENT)
Dept: INTERNAL MEDICINE | Facility: CLINIC | Age: 78
End: 2018-06-25

## 2018-06-25 VITALS
SYSTOLIC BLOOD PRESSURE: 124 MMHG | HEIGHT: 62 IN | BODY MASS INDEX: 25.06 KG/M2 | WEIGHT: 136.2 LBS | DIASTOLIC BLOOD PRESSURE: 72 MMHG

## 2018-06-25 DIAGNOSIS — E03.9 ACQUIRED HYPOTHYROIDISM: ICD-10-CM

## 2018-06-25 DIAGNOSIS — R53.82 CHRONIC FATIGUE: ICD-10-CM

## 2018-06-25 DIAGNOSIS — J30.1 CHRONIC ALLERGIC RHINITIS DUE TO POLLEN, UNSPECIFIED SEASONALITY: ICD-10-CM

## 2018-06-25 DIAGNOSIS — I10 ESSENTIAL HYPERTENSION: Primary | ICD-10-CM

## 2018-06-25 DIAGNOSIS — R31.29 OTHER MICROSCOPIC HEMATURIA: ICD-10-CM

## 2018-06-25 DIAGNOSIS — F39 MOOD DISORDER (HCC): ICD-10-CM

## 2018-06-25 DIAGNOSIS — Z12.31 ENCOUNTER FOR SCREENING MAMMOGRAM FOR BREAST CANCER: ICD-10-CM

## 2018-06-25 DIAGNOSIS — E78.49 OTHER HYPERLIPIDEMIA: ICD-10-CM

## 2018-06-25 PROBLEM — G89.29 CHRONIC PAIN OF LEFT KNEE: Status: ACTIVE | Noted: 2017-11-01

## 2018-06-25 PROBLEM — M25.562 CHRONIC PAIN OF LEFT KNEE: Status: ACTIVE | Noted: 2017-11-01

## 2018-06-25 LAB
BACTERIA UR QL AUTO: ABNORMAL /HPF
BILIRUB UR QL STRIP: NEGATIVE
CLARITY UR: CLEAR
COLOR UR: YELLOW
GLUCOSE UR STRIP-MCNC: NEGATIVE MG/DL
HGB UR QL STRIP.AUTO: ABNORMAL
HYALINE CASTS UR QL AUTO: ABNORMAL /LPF
KETONES UR QL STRIP: NEGATIVE
LEUKOCYTE ESTERASE UR QL STRIP.AUTO: ABNORMAL
MUCOUS THREADS URNS QL MICRO: ABNORMAL /HPF
NITRITE UR QL STRIP: NEGATIVE
PH UR STRIP.AUTO: 7 [PH] (ref 5–8)
PROT UR QL STRIP: NEGATIVE
RBC # UR: ABNORMAL /HPF
REF LAB TEST METHOD: ABNORMAL
SP GR UR STRIP: 1.01 (ref 1–1.03)
SQUAMOUS #/AREA URNS HPF: ABNORMAL /HPF
T4 FREE SERPL-MCNC: 1.54 NG/DL (ref 0.93–1.7)
TSH SERPL DL<=0.05 MIU/L-ACNC: 0.24 MIU/ML (ref 0.27–4.2)
UROBILINOGEN UR QL STRIP: ABNORMAL
WBC UR QL AUTO: ABNORMAL /HPF

## 2018-06-25 PROCEDURE — 99213 OFFICE O/P EST LOW 20 MIN: CPT | Performed by: INTERNAL MEDICINE

## 2018-06-25 PROCEDURE — 81001 URINALYSIS AUTO W/SCOPE: CPT | Performed by: INTERNAL MEDICINE

## 2018-06-25 PROCEDURE — 84439 ASSAY OF FREE THYROXINE: CPT | Performed by: INTERNAL MEDICINE

## 2018-06-25 PROCEDURE — 84443 ASSAY THYROID STIM HORMONE: CPT | Performed by: INTERNAL MEDICINE

## 2018-06-25 PROCEDURE — 36415 COLL VENOUS BLD VENIPUNCTURE: CPT | Performed by: INTERNAL MEDICINE

## 2018-06-25 NOTE — PROGRESS NOTES
"Subjective   Kristie Franz is a 77 y.o. female here for   Chief Complaint   Patient presents with   • Allergic Rhinitis     4 month follow-up   • Hyperlipidemia   • Hypertension   • Hypothyroidism   .    Vitals:    06/25/18 1106   BP: 124/72   BP Location: Left arm   Patient Position: Sitting   Cuff Size: Adult   Weight: 61.8 kg (136 lb 3.2 oz)   Height: 157.5 cm (62\")       Body mass index is 24.91 kg/m².    Allergic Rhinitis   Presents for follow-up visit. She complains of fatigue. She reports no cough, fever or wheezing. The problem occurs intermittently.   Hyperlipidemia   This is a chronic problem. The current episode started more than 1 year ago. The problem is controlled. Recent lipid tests were reviewed and are normal. Exacerbating diseases include hypothyroidism. She has no history of diabetes. Pertinent negatives include no chest pain or shortness of breath.   Hypertension   This is a chronic problem. The current episode started more than 1 year ago. The problem is unchanged. The problem is controlled. Pertinent negatives include no chest pain, palpitations or shortness of breath.   Hypothyroidism   This is a chronic problem. The current episode started more than 1 year ago. The problem occurs constantly. The problem has been unchanged. Associated symptoms include fatigue. Pertinent negatives include no chest pain, chills, coughing or fever.        The following portions of the patient's history were reviewed and updated as appropriate: allergies, current medications, past social history and problem list.    Review of Systems   Constitutional: Positive for fatigue. Negative for chills and fever.   Respiratory: Negative for cough, shortness of breath and wheezing.    Cardiovascular: Negative for chest pain, palpitations and leg swelling.   Psychiatric/Behavioral: Negative for dysphoric mood and sleep disturbance. The patient is not nervous/anxious.        Objective   Physical Exam   Constitutional: She " appears well-developed and well-nourished. No distress.   Cardiovascular: Normal rate, regular rhythm and normal heart sounds.    Pulmonary/Chest: No respiratory distress. She has no wheezes. She has no rales. She exhibits no tenderness.   Musculoskeletal: She exhibits no edema.   Psychiatric: She has a normal mood and affect. Her behavior is normal.   Nursing note and vitals reviewed.      Assessment/Plan   Diagnoses and all orders for this visit:    Essential hypertension  Comments:  stable - call if bp over 140/90  Orders:  -     Urinalysis With / Microscopic If Indicated (No Culture) - Urine, Clean Catch; Future    Other hyperlipidemia  Comments:  need diet/ex    Chronic fatigue  Comments:  mild - continue daily exercise    Mood disorder  Comments:  still grieving , but keeping busy with friends,etc.    Other microscopic hematuria  Comments:  3-5 RBC - need rechk  Orders:  -     Urinalysis With / Microscopic If Indicated (No Culture) - Urine, Clean Catch; Future    Acquired hypothyroidism  Comments:  rechk today  Orders:  -     TSH Rfx On Abnormal To Free T4; Future    Encounter for screening mammogram for breast cancer  -     Mammo Screening Bilateral With CAD; Future    Chronic allergic rhinitis due to pollen, unspecified seasonality  Comments:  need daily claritin or allegra - call if sx persist       Need hep A & shingrix at pharmacy.

## 2018-08-13 ENCOUNTER — TELEPHONE (OUTPATIENT)
Dept: INTERNAL MEDICINE | Facility: CLINIC | Age: 78
End: 2018-08-13

## 2018-08-13 DIAGNOSIS — E03.9 ACQUIRED HYPOTHYROIDISM: ICD-10-CM

## 2018-08-13 RX ORDER — LEVOTHYROXINE SODIUM 0.05 MG/1
50 TABLET ORAL DAILY
Qty: 90 TABLET | Refills: 1 | Status: SHIPPED | OUTPATIENT
Start: 2018-08-13 | End: 2019-03-21

## 2018-08-13 NOTE — TELEPHONE ENCOUNTER
----- Message from Seda Cao sent at 8/13/2018  8:38 AM EDT -----  Contact: pt - Dr Broussard's pt - RE: Rx refill  Pt calling and would like a refill on Rx        levothyroxine (SYNTHROID) 50 MCG tablet 30 tablet    Sig - Route: Take 1 tablet by mouth Daily. Start with 1/2 pill daily for 2 wks - Oral     [facility] [address] - [phone]  - [phone] FX    Pt # [phone]

## 2018-08-23 DIAGNOSIS — I10 ESSENTIAL HYPERTENSION: ICD-10-CM

## 2018-08-23 RX ORDER — TRIAMTERENE AND HYDROCHLOROTHIAZIDE 37.5; 25 MG/1; MG/1
TABLET ORAL
Qty: 90 TABLET | Refills: 0 | Status: SHIPPED | OUTPATIENT
Start: 2018-08-23 | End: 2019-02-13 | Stop reason: SDUPTHER

## 2018-09-11 ENCOUNTER — OFFICE VISIT (OUTPATIENT)
Dept: INTERNAL MEDICINE | Facility: CLINIC | Age: 78
End: 2018-09-11

## 2018-09-11 VITALS
HEIGHT: 61 IN | BODY MASS INDEX: 26.21 KG/M2 | WEIGHT: 138.8 LBS | OXYGEN SATURATION: 98 % | SYSTOLIC BLOOD PRESSURE: 136 MMHG | DIASTOLIC BLOOD PRESSURE: 90 MMHG | RESPIRATION RATE: 14 BRPM | HEART RATE: 67 BPM | TEMPERATURE: 97.1 F

## 2018-09-11 DIAGNOSIS — F39 MOOD DISORDER (HCC): ICD-10-CM

## 2018-09-11 DIAGNOSIS — I10 ESSENTIAL HYPERTENSION: Primary | ICD-10-CM

## 2018-09-11 DIAGNOSIS — E03.9 ACQUIRED HYPOTHYROIDISM: ICD-10-CM

## 2018-09-11 DIAGNOSIS — E78.49 OTHER HYPERLIPIDEMIA: ICD-10-CM

## 2018-09-11 DIAGNOSIS — Z12.11 COLON CANCER SCREENING: ICD-10-CM

## 2018-09-11 DIAGNOSIS — R32: ICD-10-CM

## 2018-09-11 DIAGNOSIS — Z00.00 MEDICARE ANNUAL WELLNESS VISIT, SUBSEQUENT: ICD-10-CM

## 2018-09-11 PROCEDURE — 99213 OFFICE O/P EST LOW 20 MIN: CPT | Performed by: INTERNAL MEDICINE

## 2018-09-11 PROCEDURE — G0439 PPPS, SUBSEQ VISIT: HCPCS | Performed by: INTERNAL MEDICINE

## 2018-09-11 NOTE — PROGRESS NOTES
"Subjective   Kristie Franz is a 77 y.o. female here for   Chief Complaint   Patient presents with   • Subsequent Medicare Wellness   • Hyperlipidemia   • Hypertension   • Hypothyroidism   .    Vitals:    09/11/18 1312 09/11/18 1351   BP: 124/74 136/90   BP Location: Left arm    Patient Position: Sitting    Cuff Size: Adult    Pulse: 67    Resp: 14    Temp: 97.1 °F (36.2 °C)    TempSrc: Temporal Artery     SpO2: 98%    Weight: 63 kg (138 lb 12.8 oz)    Height: 154.9 cm (61\")        Body mass index is 26.23 kg/m².    Hyperlipidemia   This is a chronic problem. The current episode started more than 1 year ago. The problem is controlled. Recent lipid tests were reviewed and are normal. Exacerbating diseases include hypothyroidism. She has no history of diabetes. Pertinent negatives include no chest pain or shortness of breath.   Hypertension   This is a chronic problem. The current episode started more than 1 year ago. The problem is unchanged. The problem is controlled. Pertinent negatives include no chest pain, palpitations or shortness of breath.   Hypothyroidism   This is a chronic problem. The current episode started more than 1 year ago. The problem occurs constantly. The problem has been unchanged. Associated symptoms include arthralgias (right knee pain) and fatigue. Pertinent negatives include no chest pain, chills, coughing or fever.        The following portions of the patient's history were reviewed and updated as appropriate: allergies, current medications, past social history and problem list.    Review of Systems   Constitutional: Positive for fatigue. Negative for chills and fever.   Respiratory: Negative for cough, shortness of breath and wheezing.    Cardiovascular: Negative for chest pain, palpitations and leg swelling.   Musculoskeletal: Positive for arthralgias (right knee pain).   Psychiatric/Behavioral: Negative for dysphoric mood and sleep disturbance. The patient is not nervous/anxious.  "       Objective   Physical Exam   Constitutional: She appears well-developed and well-nourished. No distress.   Cardiovascular: Normal rate, regular rhythm and normal heart sounds.    Pulmonary/Chest: No respiratory distress. She has no wheezes. She has no rales. She exhibits no tenderness. Right breast exhibits no inverted nipple, no mass, no nipple discharge, no skin change and no tenderness. Left breast exhibits no inverted nipple, no mass, no nipple discharge, no skin change and no tenderness.   Musculoskeletal: She exhibits no edema.   Psychiatric: She has a normal mood and affect. Her behavior is normal.   Nursing note and vitals reviewed.      Assessment/Plan   Diagnoses and all orders for this visit:    Essential hypertension  Comments:  stable - call if high or low  Orders:  -     CBC Auto Differential; Future  -     Comprehensive Metabolic Panel; Future  -     Lipid Panel; Future    Other hyperlipidemia  Comments:  need diet/ex  Orders:  -     Comprehensive Metabolic Panel; Future  -     Lipid Panel; Future    Acquired hypothyroidism  Comments:  need yearly chk  Orders:  -     TSH Rfx On Abnormal To Free T4; Future    Mood disorder (CMS/HCC)  Comments:  stable    Medicare annual wellness visit, subsequent    Mild incontinence  Comments:  call if worse    Colon cancer screening  -     Cologuard - Stool, Per Rectum; Future       Wellness today.   Need daily strengthening & balance exercises (shown today).   Normal screening for AAA & carotid disease 7/2017.       Need routine labs in 0-3 mos.

## 2018-09-11 NOTE — PATIENT INSTRUCTIONS
Medicare Wellness  Personal Prevention Plan of Service     Date of Office Visit:  2018  Encounter Provider:  Marisol Broussard MD  Place of Service:  [facility]  Patient Name: Kristie Franz  :  1940    As part of the Medicare Wellness portion of your visit today, we are providing you with this personalized preventive plan of services (PPPS). This plan is based upon recommendations of the [place] Preventive Services Task Force (USPSTF) and the Advisory Committee on Immunization Practices (ACIP).    This lists the preventive care services that should be considered, and provides dates of when you are due. Items listed as completed are up-to-date and do not require any further intervention.    Health Maintenance   Topic Date Due   • ZOSTER VACCINE (2 of 2) 2013   • INFLUENZA VACCINE  2018   • MEDICARE ANNUAL WELLNESS  2018   • COLONOSCOPY  2019   • LIPID PANEL  2019   • MAMMOGRAM  10/24/2019   • DXA SCAN  10/24/2019   • TDAP/TD VACCINES (2 - Td) 10/04/2025   • PNEUMOCOCCAL VACCINES (65+ LOW/MEDIUM RISK)  Completed       No orders of the defined types were placed in this encounter.      Return in about 6 months (around 3/11/2019).

## 2018-09-11 NOTE — PROGRESS NOTES
QUICK REFERENCE INFORMATION:  The ABCs of the Annual Wellness Visit    Subsequent Medicare Wellness Visit    HEALTH RISK ASSESSMENT    1940    Recent Hospitalizations:  No hospitalization(s) within the last year..        Current Medical Providers:  Patient Care Team:  Marisol Broussard MD as PCP - General (Internal Medicine)  Marisol Broussard MD as PCP - Claims Attributed  Kristie Wallace MD (Dermatology)  Gennaro Yeung MD as Consulting Physician (Orthopedic Surgery)  Edu Bell MD as Consulting Physician (Orthopedic Surgery)  Dee Dee Bell MD as Consulting Physician (Ophthalmology)        Smoking Status:  History   Smoking Status   • Never Smoker   Smokeless Tobacco   • Never Used       Alcohol Consumption:  History   Alcohol Use   • Yes     Comment: Seldom; socially       Depression Screen:   PHQ-2/PHQ-9 Depression Screening 9/11/2018   Little interest or pleasure in doing things 0   Feeling down, depressed, or hopeless 0   Trouble falling or staying asleep, or sleeping too much 0   Feeling tired or having little energy 0   Poor appetite or overeating 0   Feeling bad about yourself - or that you are a failure or have let yourself or your family down 0   Trouble concentrating on things, such as reading the newspaper or watching television 0   Moving or speaking so slowly that other people could have noticed. Or the opposite - being so fidgety or restless that you have been moving around a lot more than usual 0   Thoughts that you would be better off dead, or of hurting yourself in some way 0   Total Score 0   If you checked off any problems, how difficult have these problems made it for you to do your work, take care of things at home, or get along with other people? -       Health Habits and Functional and Cognitive Screening:  Functional & Cognitive Status 9/11/2018   Do you have difficulty preparing food and eating? No   Do you have difficulty bathing yourself, getting  dressed or grooming yourself? No   Do you have difficulty using the toilet? No   Do you have difficulty moving around from place to place? Yes   Do you have trouble with steps or getting out of a bed or a chair? No   In the past year have you fallen or experienced a near fall? Yes   Current Diet -   Dental Exam -   Eye Exam -   Exercise (times per week) -   Current Exercise Activities Include -   Do you need help using the phone?  No   Are you deaf or do you have serious difficulty hearing?  No   Do you need help with transportation? No   Do you need help shopping? No   Do you need help preparing meals?  No   Do you need help with housework?  No   Do you need help with laundry? No   Do you need help taking your medications? No   Do you need help managing money? No   Do you ever drive or ride in a car without wearing a seat belt? No   Do you have difficulty concentrating, remembering or making decisions? No           Does the patient have evidence of cognitive impairment? No    Aspirin use counseling: Does not need ASA (and currently is not on it)      Recent Lab Results:  CMP:  Lab Results   Component Value Date    GLU 92 04/10/2017    BUN 16 03/20/2018    CREATININE 0.80 03/20/2018    EGFRIFNONA 70 03/20/2018    EGFRIFAFRI 88 04/10/2017    BCR 20.0 03/20/2018     03/20/2018    K 4.2 03/20/2018    CO2 29.2 (H) 03/20/2018    CALCIUM 9.6 03/20/2018    PROTENTOTREF 7.2 04/10/2017    ALBUMIN 4.50 03/20/2018    LABGLOBREF 3.1 04/10/2017    LABIL2 1.3 04/10/2017    BILITOT 1.0 03/20/2018    ALKPHOS 67 03/20/2018    AST 23 03/20/2018    ALT 17 03/20/2018     Lipid Panel:  Lab Results   Component Value Date    CHOL 246 (H) 03/20/2018    TRIG 74 03/20/2018    HDL 99 (H) 03/20/2018    VLDL 14.8 03/20/2018    LDLHDL 1.34 03/20/2018     HbA1c:       Visual Acuity:  No exam data present    Age-appropriate Screening Schedule:  Refer to the list below for future screening recommendations based on patient's age, sex and/or  medical conditions. Orders for these recommended tests are listed in the plan section. The patient has been provided with a written plan.    Health Maintenance   Topic Date Due   • ZOSTER VACCINE (2 of 2) 01/24/2013   • INFLUENZA VACCINE  08/01/2018   • COLONOSCOPY  02/02/2019   • LIPID PANEL  03/20/2019   • MAMMOGRAM  10/24/2019   • DXA SCAN  10/24/2019   • TDAP/TD VACCINES (2 - Td) 10/04/2025   • PNEUMOCOCCAL VACCINES (65+ LOW/MEDIUM RISK)  Completed        Subjective   History of Present Illness    Kristie Franz is a 77 y.o. female who presents for an Subsequent Wellness Visit.    The following portions of the patient's history were reviewed and updated as appropriate: allergies, current medications, past family history, past medical history, past social history, past surgical history and problem list.    Outpatient Medications Prior to Visit   Medication Sig Dispense Refill   • acetaminophen (TYLENOL) 325 MG tablet Take 650 mg by mouth Every 6 (Six) Hours As Needed for Mild Pain (1-3).     • BIOTIN 5000 PO Take 1 tablet by mouth Daily.     • cholecalciferol (VITAMIN D3) 1000 UNITS tablet Take 1,000 Units by mouth daily.     • citalopram (CeleXA) 20 MG tablet Take 1 tablet by mouth Daily. 90 tablet 1   • glucosamine-chondroitin 500-400 MG capsule capsule Take 1 capsule by mouth Daily.     • guaiFENesin (MUCINEX) 600 MG 12 hr tablet Take 1,200 mg by mouth 2 (two) times a day.     • ibuprofen (ADVIL,MOTRIN) 200 MG tablet Take 200 mg by mouth Every 6 (Six) Hours As Needed for Mild Pain (1-3).     • levothyroxine (SYNTHROID) 50 MCG tablet Take 1 tablet by mouth Daily. 90 tablet 1   • Mirabegron ER (MYRBETRIQ) 25 MG tablet sustained-release 24 hour 24 hr tablet Take 1 tablet by mouth Daily. 30 tablet 0   • Multiple Vitamin (MULTI VITAMIN DAILY PO) Take 1 tablet by mouth Daily.     • Omega-3 Fatty Acids (FISH OIL) 1000 MG capsule capsule Take 1 capsule by mouth Daily With Breakfast.     • rizatriptan MLT (MAXALT-MLT)  "10 MG disintegrating tablet Take by mouth once as needed for migraine. May repeat in 2 hours if needed     • Travoprost (TRAVATAN Z OP) Apply 1 drop to eye Daily.     • triamterene-hydrochlorothiazide (MAXZIDE-25) 37.5-25 MG per tablet TAKE ONE TABLET BY MOUTH DAILY 90 tablet 0   • calcium carbonate (OS-CHARLIE) 600 MG tablet Take 600 mg by mouth daily.       No facility-administered medications prior to visit.        Patient Active Problem List   Diagnosis   • Hypertension   • Hyperlipidemia   • Mood disorder (CMS/HCC)   • Allergic rhinitis   • Osteopenia   • Insomnia   • Chronic pain of right knee   • Nocturia   • Chronic fatigue   • Chronic pain of left knee   • Other microscopic hematuria   • Acquired hypothyroidism   • Hypothyroidism   • Mild incontinence       Advance Care Planning:  has NO advance directive - information provided to the patient today    Identification of Risk Factors:  Risk factors include: incontinence.    Review of Systems    Compared to one year ago, the patient feels her physical health is the same.  Compared to one year ago, the patient feels her mental health is the same.    Objective     Physical Exam    Vitals:    09/11/18 1312 09/11/18 1351   BP: 124/74 136/90   BP Location: Left arm    Patient Position: Sitting    Cuff Size: Adult    Pulse: 67    Resp: 14    Temp: 97.1 °F (36.2 °C)    TempSrc: Temporal Artery     SpO2: 98%    Weight: 63 kg (138 lb 12.8 oz)    Height: 154.9 cm (61\")        Patient's Body mass index is 26.23 kg/m². BMI is within normal parameters. No follow-up required.      Assessment/Plan   Patient Self-Management and Personalized Health Advice  The patient has been provided with information about: diet, exercise, weight management, prevention of cardiac or vascular disease, the relationship between weight and GERD, fall prevention and designing advance directives and preventive services including:   · Advance directive, Colorectal cancer screening, cologuard test " ordered, Counseling for cardiovascular disease risk reduction, Diabetes screening, see lab orders, Exercise counseling provided, Fall Risk assessment done, Fall Risk plan of care done, Influenza vaccine, Nutrition counseling provided, Urinary Incontinence assessment done, Zostavax vaccine (Herpes Zoster).    Visit Diagnoses:    ICD-10-CM ICD-9-CM   1. Essential hypertension I10 401.9   2. Other hyperlipidemia E78.4 272.4   3. Acquired hypothyroidism E03.9 244.9   4. Mood disorder (CMS/formerly [facility]) F39 296.90   5. Medicare annual wellness visit, subsequent Z00.00 V70.0   6. Mild incontinence R32 788.30       Orders Placed This Encounter   Procedures   • CBC Auto Differential     Standing Status:   Future     Standing Expiration Date:   9/11/2019   • Comprehensive Metabolic Panel     Standing Status:   Future     Standing Expiration Date:   9/11/2019   • Lipid Panel     Standing Status:   Future     Standing Expiration Date:   9/11/2019   • TSH Rfx On Abnormal To Free T4     Standing Status:   Future     Standing Expiration Date:   9/11/2019       Outpatient Encounter Prescriptions as of 9/11/2018   Medication Sig Dispense Refill   • acetaminophen (TYLENOL) 325 MG tablet Take 650 mg by mouth Every 6 (Six) Hours As Needed for Mild Pain (1-3).     • BIOTIN 5000 PO Take 1 tablet by mouth Daily.     • cholecalciferol (VITAMIN D3) 1000 UNITS tablet Take 1,000 Units by mouth daily.     • citalopram (CeleXA) 20 MG tablet Take 1 tablet by mouth Daily. 90 tablet 1   • glucosamine-chondroitin 500-400 MG capsule capsule Take 1 capsule by mouth Daily.     • guaiFENesin (MUCINEX) 600 MG 12 hr tablet Take 1,200 mg by mouth 2 (two) times a day.     • ibuprofen (ADVIL,MOTRIN) 200 MG tablet Take 200 mg by mouth Every 6 (Six) Hours As Needed for Mild Pain (1-3).     • levothyroxine (SYNTHROID) 50 MCG tablet Take 1 tablet by mouth Daily. 90 tablet 1   • Mirabegron ER (MYRBETRIQ) 25 MG tablet sustained-release 24 hour 24 hr tablet Take 1 tablet  by mouth Daily. 30 tablet 0   • Multiple Vitamin (MULTI VITAMIN DAILY PO) Take 1 tablet by mouth Daily.     • Omega-3 Fatty Acids (FISH OIL) 1000 MG capsule capsule Take 1 capsule by mouth Daily With Breakfast.     • rizatriptan MLT (MAXALT-MLT) 10 MG disintegrating tablet Take by mouth once as needed for migraine. May repeat in 2 hours if needed     • Travoprost (TRAVATAN Z OP) Apply 1 drop to eye Daily.     • triamterene-hydrochlorothiazide (MAXZIDE-25) 37.5-25 MG per tablet TAKE ONE TABLET BY MOUTH DAILY 90 tablet 0   • [DISCONTINUED] calcium carbonate (OS-CHARLIE) 600 MG tablet Take 600 mg by mouth daily.       No facility-administered encounter medications on file as of 9/11/2018.        Reviewed use of high risk medication in the elderly: not applicable  Reviewed for potential of harmful drug interactions in the elderly: not applicable    Follow Up:  Return in about 6 months (around 3/11/2019) for Recheck.     An After Visit Summary and PPPS with all of these plans were given to the patient.

## 2018-09-16 ENCOUNTER — RESULTS ENCOUNTER (OUTPATIENT)
Dept: INTERNAL MEDICINE | Facility: CLINIC | Age: 78
End: 2018-09-16

## 2018-09-16 DIAGNOSIS — Z12.11 COLON CANCER SCREENING: ICD-10-CM

## 2018-10-18 DIAGNOSIS — F39 MOOD DISORDER (HCC): ICD-10-CM

## 2018-10-18 RX ORDER — CITALOPRAM 20 MG/1
TABLET ORAL
Qty: 90 TABLET | Refills: 1 | Status: SHIPPED | OUTPATIENT
Start: 2018-10-18 | End: 2019-03-21

## 2018-11-01 ENCOUNTER — OFFICE VISIT (OUTPATIENT)
Dept: INTERNAL MEDICINE | Facility: CLINIC | Age: 78
End: 2018-11-01

## 2018-11-01 ENCOUNTER — LAB (OUTPATIENT)
Dept: INTERNAL MEDICINE | Facility: CLINIC | Age: 78
End: 2018-11-01

## 2018-11-01 ENCOUNTER — APPOINTMENT (OUTPATIENT)
Dept: WOMENS IMAGING | Facility: HOSPITAL | Age: 78
End: 2018-11-01

## 2018-11-01 DIAGNOSIS — E03.9 ACQUIRED HYPOTHYROIDISM: ICD-10-CM

## 2018-11-01 DIAGNOSIS — Z12.31 ENCOUNTER FOR SCREENING MAMMOGRAM FOR BREAST CANCER: ICD-10-CM

## 2018-11-01 DIAGNOSIS — R31.21 ASYMPTOMATIC MICROSCOPIC HEMATURIA: ICD-10-CM

## 2018-11-01 DIAGNOSIS — I10 ESSENTIAL HYPERTENSION: ICD-10-CM

## 2018-11-01 DIAGNOSIS — E78.49 OTHER HYPERLIPIDEMIA: ICD-10-CM

## 2018-11-01 LAB
ALBUMIN SERPL-MCNC: 4.2 G/DL (ref 3.5–5.2)
ALBUMIN/GLOB SERPL: 1.6 G/DL
ALP SERPL-CCNC: 63 U/L (ref 39–117)
ALT SERPL W P-5'-P-CCNC: 14 U/L (ref 1–33)
ANION GAP SERPL CALCULATED.3IONS-SCNC: 9.8 MMOL/L
AST SERPL-CCNC: 17 U/L (ref 1–32)
BACTERIA UR QL AUTO: ABNORMAL /HPF
BASOPHILS # BLD AUTO: 0.05 10*3/MM3 (ref 0–0.2)
BASOPHILS NFR BLD AUTO: 1.1 % (ref 0–2)
BILIRUB SERPL-MCNC: 0.7 MG/DL (ref 0.1–1.2)
BILIRUB UR QL STRIP: NEGATIVE
BUN BLD-MCNC: 21 MG/DL (ref 8–23)
BUN/CREAT SERPL: 25 (ref 7–25)
CALCIUM SPEC-SCNC: 9.6 MG/DL (ref 8.6–10.5)
CHLORIDE SERPL-SCNC: 102 MMOL/L (ref 98–107)
CHOLEST SERPL-MCNC: 226 MG/DL (ref 0–200)
CLARITY UR: CLEAR
CO2 SERPL-SCNC: 29.2 MMOL/L (ref 22–29)
COLOR UR: YELLOW
CREAT BLD-MCNC: 0.84 MG/DL (ref 0.57–1)
DEPRECATED RDW RBC AUTO: 42.4 FL (ref 37–54)
EOSINOPHIL # BLD AUTO: 0.21 10*3/MM3 (ref 0–0.7)
EOSINOPHIL NFR BLD AUTO: 4.7 % (ref 0–5)
ERYTHROCYTE [DISTWIDTH] IN BLOOD BY AUTOMATED COUNT: 12.6 % (ref 11.5–15)
GFR SERPL CREATININE-BSD FRML MDRD: 66 ML/MIN/1.73
GLOBULIN UR ELPH-MCNC: 2.6 GM/DL
GLUCOSE BLD-MCNC: 100 MG/DL (ref 65–99)
GLUCOSE UR STRIP-MCNC: NEGATIVE MG/DL
HCT VFR BLD AUTO: 38.2 % (ref 34.1–44.9)
HDLC SERPL-MCNC: 92 MG/DL (ref 40–60)
HGB BLD-MCNC: 12.8 G/DL (ref 11.2–15.7)
HGB UR QL STRIP.AUTO: ABNORMAL
HYALINE CASTS UR QL AUTO: ABNORMAL /LPF
KETONES UR QL STRIP: NEGATIVE
LDLC SERPL CALC-MCNC: 118 MG/DL (ref 0–100)
LDLC/HDLC SERPL: 1.28 {RATIO}
LEUKOCYTE ESTERASE UR QL STRIP.AUTO: ABNORMAL
LYMPHOCYTES # BLD AUTO: 1.44 10*3/MM3 (ref 0.8–7)
LYMPHOCYTES NFR BLD AUTO: 32.4 % (ref 10–60)
MCH RBC QN AUTO: 31.8 PG (ref 26–34)
MCHC RBC AUTO-ENTMCNC: 33.5 G/DL (ref 31–37)
MCV RBC AUTO: 95 FL (ref 80–100)
MONOCYTES # BLD AUTO: 0.41 10*3/MM3 (ref 0–1)
MONOCYTES NFR BLD AUTO: 9.2 % (ref 0–13)
MUCOUS THREADS URNS QL MICRO: ABNORMAL /HPF
NEUTROPHILS # BLD AUTO: 2.33 10*3/MM3 (ref 1–11)
NEUTROPHILS NFR BLD AUTO: 52.6 % (ref 30–85)
NITRITE UR QL STRIP: NEGATIVE
PH UR STRIP.AUTO: 5.5 [PH] (ref 5–8)
PLATELET # BLD AUTO: 191 10*3/MM3 (ref 150–450)
PMV BLD AUTO: 11.7 FL (ref 6–12)
POTASSIUM BLD-SCNC: 4.3 MMOL/L (ref 3.5–5.2)
PROT SERPL-MCNC: 6.8 G/DL (ref 6–8.5)
PROT UR QL STRIP: NEGATIVE
RBC # BLD AUTO: 4.02 10*6/MM3 (ref 3.93–5.22)
RBC # UR: ABNORMAL /HPF
REF LAB TEST METHOD: ABNORMAL
SODIUM BLD-SCNC: 141 MMOL/L (ref 136–145)
SP GR UR STRIP: 1.02 (ref 1–1.03)
SQUAMOUS #/AREA URNS HPF: ABNORMAL /HPF
TRIGL SERPL-MCNC: 80 MG/DL (ref 0–150)
TSH SERPL DL<=0.05 MIU/L-ACNC: 0.56 MIU/ML (ref 0.27–4.2)
UROBILINOGEN UR QL STRIP: ABNORMAL
VLDLC SERPL-MCNC: 16 MG/DL (ref 5–40)
WBC NRBC COR # BLD: 4.44 10*3/MM3 (ref 5–10)
WBC UR QL AUTO: ABNORMAL /HPF

## 2018-11-01 PROCEDURE — 81001 URINALYSIS AUTO W/SCOPE: CPT | Performed by: INTERNAL MEDICINE

## 2018-11-01 PROCEDURE — 77067 SCR MAMMO BI INCL CAD: CPT | Performed by: INTERNAL MEDICINE

## 2018-11-01 PROCEDURE — 77067 SCR MAMMO BI INCL CAD: CPT | Performed by: RADIOLOGY

## 2018-11-01 PROCEDURE — 85025 COMPLETE CBC W/AUTO DIFF WBC: CPT | Performed by: INTERNAL MEDICINE

## 2018-11-01 PROCEDURE — 84443 ASSAY THYROID STIM HORMONE: CPT | Performed by: INTERNAL MEDICINE

## 2018-11-01 PROCEDURE — 80053 COMPREHEN METABOLIC PANEL: CPT | Performed by: INTERNAL MEDICINE

## 2018-11-01 PROCEDURE — 80061 LIPID PANEL: CPT | Performed by: INTERNAL MEDICINE

## 2018-11-01 PROCEDURE — 36415 COLL VENOUS BLD VENIPUNCTURE: CPT | Performed by: INTERNAL MEDICINE

## 2019-01-29 ENCOUNTER — OFFICE VISIT (OUTPATIENT)
Dept: ORTHOPEDIC SURGERY | Facility: CLINIC | Age: 79
End: 2019-01-29

## 2019-01-29 VITALS — HEIGHT: 61 IN | WEIGHT: 138 LBS | BODY MASS INDEX: 26.06 KG/M2

## 2019-01-29 DIAGNOSIS — M25.562 CHRONIC PAIN OF BOTH KNEES: Primary | ICD-10-CM

## 2019-01-29 DIAGNOSIS — G89.29 CHRONIC PAIN OF BOTH KNEES: Primary | ICD-10-CM

## 2019-01-29 DIAGNOSIS — M25.561 CHRONIC PAIN OF BOTH KNEES: Primary | ICD-10-CM

## 2019-01-29 PROCEDURE — 73562 X-RAY EXAM OF KNEE 3: CPT | Performed by: ORTHOPAEDIC SURGERY

## 2019-01-29 PROCEDURE — 99204 OFFICE O/P NEW MOD 45 MIN: CPT | Performed by: ORTHOPAEDIC SURGERY

## 2019-01-29 NOTE — PROGRESS NOTES
Patient: Kristie Franz  YOB: 1940 78 y.o. female  Medical Record Number: [id number]    Chief Complaints:   Chief Complaint   Patient presents with   • Left Knee - Pain, Follow-up   • Right Knee - Pain, Follow-up       History of Present Illness:Kristie Franz is a 78 y.o. female who presents with complaints of severe right greater than left knee pain.  She is now walking with a severe limp.  She has undergone extensive conservative measures by other orthopedic doctors including gel injections, steroid injections, physical therapy, anti-inflammatories.  Despite this she has limitations of basic activities of daily living due to the knee pain.  Has progressed markedly over the last 6 months.    Allergies:   Allergies   Allergen Reactions   • Amoxicillin Unknown (See Comments)     Unknown Reaction       Medications:   Current Outpatient Medications   Medication Sig Dispense Refill   • acetaminophen (TYLENOL) 325 MG tablet Take 650 mg by mouth Every 6 (Six) Hours As Needed for Mild Pain (1-3).     • BIOTIN 5000 PO Take 1 tablet by mouth Daily.     • cholecalciferol (VITAMIN D3) 1000 UNITS tablet Take 1,000 Units by mouth daily.     • citalopram (CeleXA) 20 MG tablet TAKE ONE TABLET BY MOUTH DAILY 90 tablet 1   • glucosamine-chondroitin 500-400 MG capsule capsule Take 1 capsule by mouth Daily.     • guaiFENesin (MUCINEX) 600 MG 12 hr tablet Take 1,200 mg by mouth 2 (two) times a day.     • ibuprofen (ADVIL,MOTRIN) 200 MG tablet Take 200 mg by mouth Every 6 (Six) Hours As Needed for Mild Pain (1-3).     • levothyroxine (SYNTHROID) 50 MCG tablet Take 1 tablet by mouth Daily. 90 tablet 1   • Mirabegron ER (MYRBETRIQ) 25 MG tablet sustained-release 24 hour 24 hr tablet Take 1 tablet by mouth Daily. 30 tablet 0   • Multiple Vitamin (MULTI VITAMIN DAILY PO) Take 1 tablet by mouth Daily.     • Omega-3 Fatty Acids (FISH OIL) 1000 MG capsule capsule Take 1 capsule by mouth Daily With Breakfast.     • rizatriptan  "MLT (MAXALT-MLT) 10 MG disintegrating tablet Take by mouth once as needed for migraine. May repeat in 2 hours if needed     • Travoprost (TRAVATAN Z OP) Apply 1 drop to eye Daily.     • triamterene-hydrochlorothiazide (MAXZIDE-25) 37.5-25 MG per tablet TAKE ONE TABLET BY MOUTH DAILY 90 tablet 0     No current facility-administered medications for this visit.          The following portions of the patient's history were reviewed and updated as appropriate: allergies, current medications, past family history, past medical history, past social history, past surgical history and problem list.    Review of Systems:   A 14 point review of systems was performed. All systems negative except pertinent positives/negative listed in HPI above    Physical Exam:   Vitals:    01/29/19 1518   Weight: 62.6 kg (138 lb)   Height: 154.9 cm (61\")       General: A and O x 3, ASA, NAD    SCLERA:    Normal    DENTITION:   Normal     Knee:  right    ALIGNMENT:     Valgus      GAIT:    Antalgic    SKIN:    No abnormality    RANGE OF MOTION:   10  -  90   DEG    STRENGTH:   4  / 5    LIGAMENTS:    No varus / valgus instability.   Negative  Lachman.    MENISCUS:     Negative   Evan       DISTAL PULSES:    Paplable    DISTAL SENSATION :   Intact    LYMPHATICS:     No   lymphadenopathy    OTHER:          - Positive   effusion      - Crepitance with ROM     Radiology:  Xrays 3views both knees (ap,lateral, sunrise) were ordered and reviewed for evaluation of knee pain demonstrating advanced valgus osteoarthritis with bone on bone articulation, subchondral cysts, and periarticular osteophytes- right knee greater than left.  There are no previous films for comparison    Assessment/Plan: Advanced right greater than left knee end-stage osteoarthritis.  She has failed the full complement of conservative measures.  Continuation of conservative management vs. TKA discussed.  The patient wishes to proceed with total knee replacement.  At this point " the patient has failed the full compliment of conservative treatment and stating complete understanding of the risks/benefits/ anternatives wishes to proceed with surgical treatment.    Risk and benefits of surgery were reviewed.  Including, but not limited to, blood clots or pulmonary embolism, anesthesia risk, infection, fracture, skin/leg numbness, persistent pain/crepitance/popping/catching, failure of the implant, need for future surgeries, hematoma, possible nerve or blood vessel injury, need for transfusion, and potential risk of stroke,heart attack or death, among others.  The patient understands and wishes to proceed.     It was explained that if tissue has been repaired or reconstructed, there is also an increased chance of failure which may require further management.  Following the completion of the discussion, the patient expressed understanding of this planned course of care, all their questions were answered and consent will be obtained preoperatively.    Operative Plan: right [employer] Total Knee Replacement a 3 day staywith inpatient rehab  - [facility]        Trevon Hunt MD  1/29/2019

## 2019-01-31 ENCOUNTER — TELEPHONE (OUTPATIENT)
Dept: ORTHOPEDIC SURGERY | Facility: CLINIC | Age: 79
End: 2019-01-31

## 2019-01-31 DIAGNOSIS — M17.11 PRIMARY OSTEOARTHRITIS OF RIGHT KNEE: Primary | ICD-10-CM

## 2019-01-31 RX ORDER — VANCOMYCIN HYDROCHLORIDE 1 G/200ML
15 INJECTION, SOLUTION INTRAVENOUS ONCE
Status: CANCELLED | OUTPATIENT
Start: 2019-04-08 | End: 2019-01-31

## 2019-01-31 RX ORDER — PREGABALIN 75 MG/1
150 CAPSULE ORAL ONCE
Status: CANCELLED | OUTPATIENT
Start: 2019-04-08 | End: 2019-01-31

## 2019-01-31 RX ORDER — CLINDAMYCIN PHOSPHATE 900 MG/50ML
900 INJECTION INTRAVENOUS ONCE
Status: CANCELLED | OUTPATIENT
Start: 2019-04-08 | End: 2019-01-31

## 2019-01-31 RX ORDER — MELOXICAM 15 MG/1
15 TABLET ORAL ONCE
Status: CANCELLED | OUTPATIENT
Start: 2019-04-08 | End: 2019-01-31

## 2019-02-07 PROBLEM — M17.11 PRIMARY OSTEOARTHRITIS OF RIGHT KNEE: Status: ACTIVE | Noted: 2019-02-07

## 2019-02-09 DIAGNOSIS — E03.9 ACQUIRED HYPOTHYROIDISM: ICD-10-CM

## 2019-02-11 ENCOUNTER — TREATMENT (OUTPATIENT)
Dept: PHYSICAL THERAPY | Facility: CLINIC | Age: 79
End: 2019-02-11

## 2019-02-11 DIAGNOSIS — G89.29 CHRONIC PAIN OF BOTH KNEES: Primary | ICD-10-CM

## 2019-02-11 DIAGNOSIS — M25.561 CHRONIC PAIN OF BOTH KNEES: Primary | ICD-10-CM

## 2019-02-11 DIAGNOSIS — M25.562 CHRONIC PAIN OF BOTH KNEES: Primary | ICD-10-CM

## 2019-02-11 PROCEDURE — 97110 THERAPEUTIC EXERCISES: CPT | Performed by: PHYSICAL THERAPIST

## 2019-02-11 PROCEDURE — 97161 PT EVAL LOW COMPLEX 20 MIN: CPT | Performed by: PHYSICAL THERAPIST

## 2019-02-11 RX ORDER — LEVOTHYROXINE SODIUM 0.05 MG/1
TABLET ORAL
Qty: 30 TABLET | Refills: 3 | Status: SHIPPED | OUTPATIENT
Start: 2019-02-11 | End: 2019-02-12 | Stop reason: DRUGHIGH

## 2019-02-11 NOTE — PROGRESS NOTES
Physical Therapy Initial Evaluation and Plan of Care    Patient: Kristie Franz   : 1940  Diagnosis/ICD-10 Code:  Chronic pain of both knees [M25.561, M25.562, G89.29]  Referring practitioner: Trevon Hunt MD  Date of Initial Visit: 2019  Today's Date: 2019    Subjective Evaluation    History of Present Illness  Onset date: years ago.  Mechanism of injury: Hx of bilateral knee OA of 10+ years duration. Right knee TKA planned for . Occasional back pain because of altered gait pattern.     Quality of life: good    Pain  Current pain ratin  At best pain ratin  At worst pain ratin  Location: B knees  Relieving factors: rest and change in position  Aggravating factors: stairs and ambulation    Social Support  Lives in: multiple-level home (finished basement, doesn't have to go down there often)  Lives with: alone    Diagnostic Tests  X-ray: abnormal (end stage OA)    Patient Goals  Patient goals for therapy: increased strength             Objective       Observations   Left Knee   Negative for atrophy, deformity and edema.     Right Knee   Positive for edema. Negative for atrophy and deformity.     Tenderness   Left Knee   Tenderness in the lateral joint line and medial joint line.     Right Knee   Tenderness in the lateral joint line and medial joint line.     Neurological Testing     Sensation     Knee   Left Knee   Intact: light touch    Right Knee   Intact: light touch     Active Range of Motion   Left Knee   Flexion: 129 degrees   Extension: 0 degrees     Right Knee   Flexion: 109 degrees   Extension: 6 degrees     Patellar Mobility   Left Knee Patellar tendons within functional limits include the medial, lateral, superior and inferior.     Right Knee Patellar tendons within functional limits include the medial, lateral, superior and inferior.     Patellar Static Positioning   Left Knee: WFL  Right Knee: WFL    Strength/Myotome Testing     Left Hip   Planes of Motion    Flexion: 4+  Extension: 4-  Abduction: 4-    Right Hip   Planes of Motion   Flexion: 4+  Extension: 4-  Abduction: 4-    Left Knee   Flexion: 4+  Extension: 4  Quadriceps contraction: good    Right Knee   Flexion: 3-  Extension: 3-  Quadriceps contraction: fair    Tests     Left Hip   Dany: Positive.   90/90 SLR: Positive.     Right Hip   Dany: Positive.   90/90 SLR: Positive.     Left Knee   Negative Thessaly's test at 5 degrees, Thessaly's test at 20 degrees, valgus stress test at 0 degrees, valgus stress test at 30 degrees, varus stress test at 0 degrees and varus stress test at 30 degrees.     Right Knee   Negative Thessaly's test at 5 degrees, Thessaly's test at 20 degrees, valgus stress test at 0 degrees, valgus stress test at 30 degrees, varus stress test at 0 degrees and varus stress test at 30 degrees.     Ambulation     Observational Gait   Gait: within functional limits          Assessment & Plan     Assessment  Impairments: abnormal muscle tone, abnormal or restricted ROM, impaired physical strength, lacks appropriate home exercise program and pain with function  Assessment details: Ms. Franz is a pleasant 78 year old female who presents with pain, gait deviation, and limitation in knee ROM and core/LE strength consistent with OA. She will benefit from PT to establish exercise program to address impairments and prepare her for planned TKA.  Prognosis: good  Functional Limitations: walking, uncomfortable because of pain and standing  Goals  Plan Goals: Short Term Goals: 2-4 weeks. Patient will:  1. Be independent with initial HEP  2. Be instructed in posture and body mechanics    Long Term Goals: 4-6 weeks. Patient will:  1. Demonstrate improved Bilateral lower extremity MMT of >/= 4+/5  2. Demonstrate lower extremity muscle flexibility WFL.  3. Perceived limitation improved by 10% as measured by LEFS      Plan  Therapy options: will be seen for skilled physical therapy services  Planned modality  interventions: cryotherapy, ultrasound, TENS, electrical stimulation/Russian stimulation and thermotherapy (hydrocollator packs)  Planned therapy interventions: abdominal trunk stabilization, manual therapy, neuromuscular re-education, postural training, soft tissue mobilization, spinal/joint mobilization, joint mobilization, stretching, strengthening, functional ROM exercises and flexibility  Frequency: 1-2x per week.  Duration in weeks: 8  Treatment plan discussed with: patient        Manual Therapy:    0     mins  59861;  Therapeutic Exercise:    24     mins  92121;     Neuromuscular Honey:    0    mins  98724;    Therapeutic Activity:     0     mins  20237;     Gait Trainin     mins  03358;     Ultrasound:     0     mins  62679;    Electrical Stimulation:    0     mins  79937 ( );    Timed Treatment:   24   mins   Total Treatment:     55   mins    PT SIGNATURE: Danyelle Stokes PT, DPT          Physical Therapist                               [place] License #[id number]    DATE TREATMENT INITIATED: 2019    Initial Certification  Certification Period: 2019  I certify that the therapy services are furnished while this patient is under my care.  The services outlined above are required by this patient, and will be reviewed every 90 days.     PHYSICIAN: Trevon Hunt MD      DATE:     Please sign and return via fax to [phone].. Thank you, [facility].

## 2019-02-12 ENCOUNTER — TELEPHONE (OUTPATIENT)
Dept: INTERNAL MEDICINE | Facility: CLINIC | Age: 79
End: 2019-02-12

## 2019-02-12 NOTE — TELEPHONE ENCOUNTER
----- Message from Jackie Palencia sent at 2/12/2019 10:28 AM EST -----  Contact: Jasmeet caceres [facility]  Pharmacy is calling to get clarification or change    RX:levothyroxine (SYNTHROID, LEVOTHROID) 50 MCG tablet   Directions    Caller#[phone]

## 2019-02-12 NOTE — PATIENT INSTRUCTIONS
Pt was educated regarding relevant anatomy/physiology and plan of care.      Access Code: [email or web address]   URL: [email or web address]   Date: 02/12/2019   Prepared by: Danyelle Stokes     Exercises   Supine Hamstring Stretch with Strap - 3 reps - 20 hold - 1x daily   Supine Quad Set - 10 reps - 1 sets - 10 hold - 1x daily   Supine Heel Slide with Strap - 10 reps - 1 sets - 10 hold - 1x daily   Supine Active Straight Leg Raise - 10 reps - 2 sets - 1x daily   Supine Bridge - 20 reps - 2 sets - 5 hold - 1x daily   Sidelying Hip Abduction - 10 reps - 2 sets - 1x daily

## 2019-02-12 NOTE — TELEPHONE ENCOUNTER
Should this be 1 po qd?  It says start with 1/2 tablet for 2 weeks, but it looks like she had done that previously.

## 2019-02-13 DIAGNOSIS — I10 ESSENTIAL HYPERTENSION: ICD-10-CM

## 2019-02-13 RX ORDER — TRIAMTERENE AND HYDROCHLOROTHIAZIDE 37.5; 25 MG/1; MG/1
TABLET ORAL
Qty: 90 TABLET | Refills: 1 | Status: SHIPPED | OUTPATIENT
Start: 2019-02-13 | End: 2019-03-21

## 2019-02-22 ENCOUNTER — TREATMENT (OUTPATIENT)
Dept: PHYSICAL THERAPY | Facility: CLINIC | Age: 79
End: 2019-02-22

## 2019-02-22 DIAGNOSIS — M25.562 CHRONIC PAIN OF BOTH KNEES: Primary | ICD-10-CM

## 2019-02-22 DIAGNOSIS — M25.561 CHRONIC PAIN OF BOTH KNEES: Primary | ICD-10-CM

## 2019-02-22 DIAGNOSIS — G89.29 CHRONIC PAIN OF BOTH KNEES: Primary | ICD-10-CM

## 2019-02-22 PROCEDURE — 97110 THERAPEUTIC EXERCISES: CPT | Performed by: PHYSICAL THERAPIST

## 2019-02-22 NOTE — PROGRESS NOTES
" Physical Therapy Daily Progress Note    Visit #2    Subjective     Kristie Franz reports[facility]. Knees \"feel about the same\"      Objective   See Exercise, Manual, and Modality Logs for complete treatment.       Assessment/Plan  Added new exercises to HEP, written copy provided.  Progress per Plan of Care           Manual Therapy:    0     mins  27532;  Therapeutic Exercise:    35     mins  73581;     Neuromuscular Honey:    0    mins  74049;    Therapeutic Activity:     0     mins  47092;     Gait Trainin     mins  14463;     Ultrasound:     0     mins  70854;    Electrical Stimulation:    0     mins  84649 ( );    Timed Treatment:   35   mins   Total Treatment:     45   mins    Danyelle Stokes PT, DPT  Physical Therapist  [place] License #[id number]                    "

## 2019-02-22 NOTE — PATIENT INSTRUCTIONS
Access Code: [email or web address]   URL: [email or web address]   Date: 02/22/2019   Prepared by: Danyelle Stokes     Exercises   Clamshell with Resistance - 10 reps - 2 sets - 1x daily   Heel Raises with Counter Support - 10 reps - 2 sets - 1x daily   Standing Hip Flexion March - 10 reps - 2 sets - 5 weekly - 1x daily   Supine Hip Adduction Isometric with Ball - 5 hold - 10 reps - 2 sets - 1x daily

## 2019-03-01 ENCOUNTER — TREATMENT (OUTPATIENT)
Dept: PHYSICAL THERAPY | Facility: CLINIC | Age: 79
End: 2019-03-01

## 2019-03-01 DIAGNOSIS — G89.29 CHRONIC PAIN OF BOTH KNEES: Primary | ICD-10-CM

## 2019-03-01 DIAGNOSIS — M25.561 CHRONIC PAIN OF BOTH KNEES: Primary | ICD-10-CM

## 2019-03-01 DIAGNOSIS — M25.562 CHRONIC PAIN OF BOTH KNEES: Primary | ICD-10-CM

## 2019-03-01 PROCEDURE — 97110 THERAPEUTIC EXERCISES: CPT | Performed by: PHYSICAL THERAPIST

## 2019-03-01 NOTE — PROGRESS NOTES
Physical Therapy Daily Progress Note    Visit #3    Subjective     Kristie Franz reports: exercises are feeling good, she feels like she is getting stronger.      Objective   See Exercise, Manual, and Modality Logs for complete treatment.       Assessment/Plan  Right knee still lacking full extension, hard end feel so likely will not improve until surgery.  Progress per Plan of Care           Manual Therapy:    0     mins  66259;  Therapeutic Exercise:    35     mins  74780;     Neuromuscular Honey:    0    mins  84705;    Therapeutic Activity:     0     mins  84790;     Gait Trainin     mins  75210;     Ultrasound:     0     mins  16795;    Electrical Stimulation:    0     mins  75340 ( );    Timed Treatment:   35   mins   Total Treatment:     45   mins    Danyelle Stokes PT, DPT  Physical Therapist  [place] License #[id number]

## 2019-03-05 ENCOUNTER — TREATMENT (OUTPATIENT)
Dept: PHYSICAL THERAPY | Facility: CLINIC | Age: 79
End: 2019-03-05

## 2019-03-05 DIAGNOSIS — M25.561 CHRONIC PAIN OF BOTH KNEES: Primary | ICD-10-CM

## 2019-03-05 DIAGNOSIS — G89.29 CHRONIC PAIN OF BOTH KNEES: Primary | ICD-10-CM

## 2019-03-05 DIAGNOSIS — M25.562 CHRONIC PAIN OF BOTH KNEES: Primary | ICD-10-CM

## 2019-03-05 PROCEDURE — 97110 THERAPEUTIC EXERCISES: CPT | Performed by: PHYSICAL THERAPIST

## 2019-03-05 NOTE — PROGRESS NOTES
Physical Therapy Daily Progress Note    Visit #4    Subjective     Kristie Franz reports: no new complaints.      Objective   See Exercise, Manual, and Modality Logs for complete treatment.       Assessment/Plan  Quad strength is improving.  Progress per Plan of Care           Manual Therapy:    0     mins  79873;  Therapeutic Exercise:    40     mins  04267;     Neuromuscular Honey:    0    mins  47276;    Therapeutic Activity:     0     mins  30872;     Gait Trainin     mins  00436;     Ultrasound:     0     mins  10437;    Electrical Stimulation:    0     mins  78974 ( );    Timed Treatment:   40   mins   Total Treatment:     50   mins    Danyelle Stokes PT, DPT  Physical Therapist  [place] License #[id number]

## 2019-03-07 ENCOUNTER — TELEPHONE (OUTPATIENT)
Dept: INTERNAL MEDICINE | Facility: CLINIC | Age: 79
End: 2019-03-07

## 2019-03-07 NOTE — TELEPHONE ENCOUNTER
----- Message from Felicia Mcclain sent at 3/7/2019  2:15 PM EST -----  Contact: Patient  Patient calling and would like to talk to Dr. Broussard about labs and having knee surgery. Please advise    Patient:[phone]

## 2019-03-08 DIAGNOSIS — E03.9 ACQUIRED HYPOTHYROIDISM: Primary | ICD-10-CM

## 2019-03-08 NOTE — TELEPHONE ENCOUNTER
Discussed - I ordered TSH - she may get this with pre-op labs prior to knee surgery (started synthroid less than 1 yr ago - normal TSH 4-5 mos ago).

## 2019-03-12 ENCOUNTER — TREATMENT (OUTPATIENT)
Dept: PHYSICAL THERAPY | Facility: CLINIC | Age: 79
End: 2019-03-12

## 2019-03-12 DIAGNOSIS — M25.562 CHRONIC PAIN OF BOTH KNEES: Primary | ICD-10-CM

## 2019-03-12 DIAGNOSIS — M25.561 CHRONIC PAIN OF BOTH KNEES: Primary | ICD-10-CM

## 2019-03-12 DIAGNOSIS — G89.29 CHRONIC PAIN OF BOTH KNEES: Primary | ICD-10-CM

## 2019-03-12 PROCEDURE — 97110 THERAPEUTIC EXERCISES: CPT | Performed by: PHYSICAL THERAPIST

## 2019-03-12 NOTE — PROGRESS NOTES
Re-Assessment / Re-Certification    Patient: Kristie Franz   : 1940  Diagnosis/ICD-10 Code:  Chronic pain of both knees [M25.561, M25.562, G89.29]  Referring practitioner: Trevon Hunt MD  Date of Initial Visit: 2019  Today's Date: 3/12/2019  Patient seen for 5 sessions      Subjective:   Kristie Franz reports: she feels better when she exercises, feels that she is getting stronger.  Subjective Questionnaire: LEFS: 45/80 (improved from 34/80 at initial evaluation)  Clinical Progress: improved  Home Program Compliance: Yes  Treatment has included: therapeutic exercise and cryotherapy    Subjective   Objective       Strength/Myotome Testing     Left Hip   Planes of Motion   Flexion: 4+  Extension: 4  Abduction: 4    Right Hip   Planes of Motion   Flexion: 4+  Extension: 4  Abduction: 4     Assessment/Plan  Progress toward previous goals: Partially Met    Short Term Goals: 2-4 weeks. Patient will:  1. Be independent with initial HEP (MET)  2. Be instructed in posture and body mechanics (MET)    Long Term Goals: 4-6 weeks. Patient will:  1. Demonstrate improved Bilateral lower extremity MMT of >/= 4+/5 (PROGRESSING)  2. Demonstrate lower extremity muscle flexibility WFL. (PROGRESSING)  3. Perceived limitation improved by 10% as measured by LEFS (MET)      Recommendations: Continue as planned, focused on strengthening to prepare for surgery in April.  Timeframe: 1 month  Prognosis to achieve goals: good    PT Signature: Danyelle Stokes PT, DPT                         Physical Therapist                         [place] License #[id number]    Based upon review of the patient's progress and continued therapy plan, it is my medical opinion that Kristie Franz should continue physical therapy treatment at [facility]  [address] [place] [id number] [phone].    Signature: __________________________________  Trevon Hunt MD    Manual Therapy:    0     mins   62925;  Therapeutic Exercise:    35     mins  26669;     Neuromuscular Honey:    0    mins  39527;    Therapeutic Activity:     0     mins  71883;     Gait Trainin     mins  01004;     Ultrasound:     0     mins  73927;    Electrical Stimulation:    0     mins  66378 ( );    Timed Treatment:   35   mins   Total Treatment:     45   mins

## 2019-03-19 ENCOUNTER — TREATMENT (OUTPATIENT)
Dept: PHYSICAL THERAPY | Facility: CLINIC | Age: 79
End: 2019-03-19

## 2019-03-19 DIAGNOSIS — M25.561 CHRONIC PAIN OF BOTH KNEES: Primary | ICD-10-CM

## 2019-03-19 DIAGNOSIS — G89.29 CHRONIC PAIN OF BOTH KNEES: Primary | ICD-10-CM

## 2019-03-19 DIAGNOSIS — M25.562 CHRONIC PAIN OF BOTH KNEES: Primary | ICD-10-CM

## 2019-03-19 PROCEDURE — 97110 THERAPEUTIC EXERCISES: CPT | Performed by: PHYSICAL THERAPIST

## 2019-03-19 NOTE — PROGRESS NOTES
Physical Therapy Daily Progress Note    Visit #6    Subjective     Kristie Franz reports: foot is sore from workout at the gym. No new complaints with the knees.      Objective   See Exercise, Manual, and Modality Logs for complete treatment.       Assessment/Plan  Tolerated well without increased knee or ankle pain.  Progress per Plan of Care           Manual Therapy:    0     mins  90431;  Therapeutic Exercise:    30     mins  82020;     Neuromuscular Honey:    0    mins  68516;    Therapeutic Activity:     0     mins  88036;     Gait Trainin     mins  14081;     Ultrasound:     0     mins  88792;    Electrical Stimulation:    0     mins  84877 ( );    Timed Treatment:   30   mins   Total Treatment:     40   mins    Danyelle Stokes PT, DPT  Physical Therapist  [place] License #[id number]

## 2019-03-21 ENCOUNTER — APPOINTMENT (OUTPATIENT)
Dept: PREADMISSION TESTING | Facility: HOSPITAL | Age: 79
End: 2019-03-21

## 2019-03-21 VITALS
RESPIRATION RATE: 20 BRPM | TEMPERATURE: 97.7 F | HEIGHT: 61 IN | OXYGEN SATURATION: 100 % | SYSTOLIC BLOOD PRESSURE: 139 MMHG | HEART RATE: 57 BPM | BODY MASS INDEX: 26.24 KG/M2 | WEIGHT: 139 LBS | DIASTOLIC BLOOD PRESSURE: 89 MMHG

## 2019-03-21 DIAGNOSIS — M17.11 PRIMARY OSTEOARTHRITIS OF RIGHT KNEE: ICD-10-CM

## 2019-03-21 DIAGNOSIS — E03.9 ACQUIRED HYPOTHYROIDISM: ICD-10-CM

## 2019-03-21 LAB
ANION GAP SERPL CALCULATED.3IONS-SCNC: 8.9 MMOL/L
BACTERIA UR QL AUTO: ABNORMAL /HPF
BILIRUB UR QL STRIP: NEGATIVE
BUN BLD-MCNC: 16 MG/DL (ref 8–23)
BUN/CREAT SERPL: 22.9 (ref 7–25)
CALCIUM SPEC-SCNC: 9.6 MG/DL (ref 8.6–10.5)
CHLORIDE SERPL-SCNC: 103 MMOL/L (ref 98–107)
CLARITY UR: CLEAR
CO2 SERPL-SCNC: 29.1 MMOL/L (ref 22–29)
COLOR UR: YELLOW
CREAT BLD-MCNC: 0.7 MG/DL (ref 0.57–1)
DEPRECATED RDW RBC AUTO: 44.5 FL (ref 37–54)
ERYTHROCYTE [DISTWIDTH] IN BLOOD BY AUTOMATED COUNT: 12.7 % (ref 12.3–15.4)
GFR SERPL CREATININE-BSD FRML MDRD: 81 ML/MIN/1.73
GLUCOSE BLD-MCNC: 95 MG/DL (ref 65–99)
GLUCOSE UR STRIP-MCNC: NEGATIVE MG/DL
HCT VFR BLD AUTO: 39.4 % (ref 34–46.6)
HGB BLD-MCNC: 12.9 G/DL (ref 12–15.9)
HGB UR QL STRIP.AUTO: ABNORMAL
HYALINE CASTS UR QL AUTO: ABNORMAL /LPF
KETONES UR QL STRIP: NEGATIVE
LEUKOCYTE ESTERASE UR QL STRIP.AUTO: ABNORMAL
MCH RBC QN AUTO: 31.3 PG (ref 26.6–33)
MCHC RBC AUTO-ENTMCNC: 32.7 G/DL (ref 31.5–35.7)
MCV RBC AUTO: 95.6 FL (ref 79–97)
NITRITE UR QL STRIP: NEGATIVE
PH UR STRIP.AUTO: 6.5 [PH] (ref 5–8)
PLATELET # BLD AUTO: 188 10*3/MM3 (ref 140–450)
PMV BLD AUTO: 11.8 FL (ref 6–12)
POTASSIUM BLD-SCNC: 4.3 MMOL/L (ref 3.5–5.2)
PROT UR QL STRIP: NEGATIVE
RBC # BLD AUTO: 4.12 10*6/MM3 (ref 3.77–5.28)
RBC # UR: ABNORMAL /HPF
REF LAB TEST METHOD: ABNORMAL
SODIUM BLD-SCNC: 141 MMOL/L (ref 136–145)
SP GR UR STRIP: 1.02 (ref 1–1.03)
SQUAMOUS #/AREA URNS HPF: ABNORMAL /HPF
TSH SERPL DL<=0.05 MIU/L-ACNC: 2.28 MIU/ML (ref 0.27–4.2)
UROBILINOGEN UR QL STRIP: ABNORMAL
WBC NRBC COR # BLD: 5.01 10*3/MM3 (ref 3.4–10.8)
WBC UR QL AUTO: ABNORMAL /HPF

## 2019-03-21 PROCEDURE — 93010 ELECTROCARDIOGRAM REPORT: CPT | Performed by: INTERNAL MEDICINE

## 2019-03-21 PROCEDURE — 36415 COLL VENOUS BLD VENIPUNCTURE: CPT

## 2019-03-21 PROCEDURE — 84443 ASSAY THYROID STIM HORMONE: CPT | Performed by: INTERNAL MEDICINE

## 2019-03-21 PROCEDURE — 93005 ELECTROCARDIOGRAM TRACING: CPT

## 2019-03-21 PROCEDURE — 87086 URINE CULTURE/COLONY COUNT: CPT | Performed by: ORTHOPAEDIC SURGERY

## 2019-03-21 PROCEDURE — 80048 BASIC METABOLIC PNL TOTAL CA: CPT | Performed by: ORTHOPAEDIC SURGERY

## 2019-03-21 PROCEDURE — 81001 URINALYSIS AUTO W/SCOPE: CPT | Performed by: ORTHOPAEDIC SURGERY

## 2019-03-21 PROCEDURE — 85027 COMPLETE CBC AUTOMATED: CPT | Performed by: ORTHOPAEDIC SURGERY

## 2019-03-21 RX ORDER — SENNOSIDES 8.6 MG
650 CAPSULE ORAL EVERY 8 HOURS PRN
COMMUNITY
End: 2019-04-11 | Stop reason: HOSPADM

## 2019-03-21 RX ORDER — ERGOCALCIFEROL (VITAMIN D2) 50 MCG
2000 CAPSULE ORAL DAILY
COMMUNITY
End: 2019-04-11 | Stop reason: HOSPADM

## 2019-03-21 RX ORDER — CITALOPRAM 20 MG/1
20 TABLET ORAL EVERY MORNING
COMMUNITY
End: 2019-05-01 | Stop reason: SDUPTHER

## 2019-03-21 RX ORDER — TRAVOPROST 0.004 %
1 DROPS OPHTHALMIC (EYE) NIGHTLY
COMMUNITY
Start: 2019-02-20 | End: 2021-11-23

## 2019-03-21 RX ORDER — CHLORHEXIDINE GLUCONATE 500 MG/1
1 CLOTH TOPICAL TAKE AS DIRECTED
COMMUNITY
End: 2019-04-11 | Stop reason: HOSPADM

## 2019-03-21 RX ORDER — TRIAMTERENE AND HYDROCHLOROTHIAZIDE 37.5; 25 MG/1; MG/1
1 TABLET ORAL EVERY OTHER DAY
COMMUNITY
End: 2020-02-17

## 2019-03-21 RX ORDER — LEVOTHYROXINE SODIUM 0.05 MG/1
50 TABLET ORAL EVERY MORNING
COMMUNITY
End: 2019-06-26 | Stop reason: SDUPTHER

## 2019-03-21 RX ORDER — FEXOFENADINE HCL 180 MG/1
180 TABLET ORAL AS NEEDED
COMMUNITY
End: 2022-01-09

## 2019-03-21 ASSESSMENT — KOOS JR
KOOS JR SCORE: 18
KOOS JR SCORE: 42.281

## 2019-03-21 NOTE — DISCHARGE INSTRUCTIONS
Take the following medications the morning of surgery with a small sip of water:        General Instructions:  • Do not eat solid food after midnight the night before surgery.  • You may drink clear liquids day of surgery but must stop at least one hour before your hospital arrival time.  • It is beneficial for you to have a clear drink that contains carbohydrates the day of surgery.  We suggest a 12 to 20 ounce bottle of Gatorade or Powerade for non-diabetic patients or a 12 to 20 ounce bottle of G2 or Powerade Zero for diabetic patients. (Pediatric patients, are not advised to drink a 12 to 20 ounce carbohydrate drink)    Clear liquids are liquids you can see through.  Nothing red in color.     Plain water                               Sports drinks  Sodas                                   Gelatin (Jell-O)  Fruit juices without pulp such as white grape juice and apple juice  Popsicles that contain no fruit or yogurt  Tea or coffee (no cream or milk added)  Gatorade / Powerade  G2 / Powerade Zero    • Infants may have breast milk up to four hours before surgery.  • Infants drinking formula may drink formula up to six hours before surgery.   • Patients who avoid smoking, chewing tobacco and alcohol for 4 weeks prior to surgery have a reduced risk of post-operative complications.  Quit smoking as many days before surgery as you can.  • Do not smoke, use chewing tobacco or drink alcohol the day of surgery.   • If applicable bring your C-PAP/ BI-PAP machine.  • Bring any papers given to you in the doctor’s office.  • Wear clean comfortable clothes and socks.  • Do not wear contact lenses or make-up.  Bring a case for your glasses.   • Bring crutches or walker if applicable.  • Remove all piercings.  Leave jewelry and any other valuables at home.  • Hair extensions with metal clips must be removed prior to surgery.  • The Pre-Admission Testing nurse will instruct you to bring medications if unable to obtain an accurate  list in Pre-Admission Testing.        If you were given a blood bank ID arm band remember to bring it with you the day of surgery.    Preventing a Surgical Site Infection:  • For 2 to 3 days before surgery, avoid shaving with a razor because the razor can irritate skin and make it easier to develop an infection.    • Any areas of open skin can increase the risk of a post-operative wound infection by allowing bacteria to enter and travel throughout the body.  Notify your surgeon if you have any skin wounds / rashes even if it is not near the expected surgical site.  The area will need assessed to determine if surgery should be delayed until it is healed.  • The night prior to surgery sleep in a clean bed with clean clothing.  Do not allow pets to sleep with you.  • Shower on the morning of surgery using a fresh bar of anti-bacterial soap (such as Dial) and clean washcloth.  Dry with a clean towel and dress in clean clothing.  • Ask your surgeon if you will be receiving antibiotics prior to surgery.  • Make sure you, your family, and all healthcare providers clean their hands with soap and water or an alcohol based hand  before caring for you or your wound.    Day of surgery:4/8/19   0700  Upon arrival, a Pre-op nurse and Anesthesiologist will review your health history, obtain vital signs, and answer questions you may have.  The only belongings needed at this time will be your home medications and if applicable your C-PAP/BI-PAP machine.  If you are staying overnight your family can leave the rest of your belongings in the car and bring them to your room later.  A Pre-op nurse will start an IV and you may receive medication in preparation for surgery, including something to help you relax.  Your family will be able to see you in the Pre-op area.  While you are in surgery your family should notify the waiting room  if they leave the waiting room area and provide a contact phone number.    Please  be aware that surgery does come with discomfort.  We want to make every effort to control your discomfort so please discuss any uncontrolled symptoms with your nurse.   Your doctor will most likely have prescribed pain medications.      If you are going home after surgery you will receive individualized written care instructions before being discharged.  A responsible adult must drive you to and from the hospital on the day of your surgery and stay with you for 24 hours.    If you are staying overnight following surgery, you will be transported to your hospital room following the recovery period.  [facility] has all private rooms.    You have received a list of surgical assistants for your reference.  If you have any questions please call Pre-Admission Testing at [phone].  Deductibles and co-payments are collected on the day of service. Please be prepared to pay the required co-pay, deductible or deposit on the day of service as defined by your plan.    2% CHLORAHEXIDINE GLUCONATE* CLOTH  Preparing or “prepping” skin before surgery can reduce the risk of infection at the surgical site. To make the process easier, [facility] has chosen disposable cloths moistened with a rinse-free, 2% Chlorhexidine Gluconate (CHG) antiseptic solution. The steps below outline the prepping process and should be carefully followed.        Use the prep cloth on the area that is circled in the diagram             Directions Night before Surgery  1) Shower using a fresh bar of anti-bacterial soap (such as Dial) and clean washcloth.  Use a clean towel to completely dry your skin.  2) Do not use any lotions, oils or creams on your skin.  3) Open the package and remove 1 cloth, wipe your skin for 30 seconds in a circular motion.  Allow to dry for 3 minutes.  4) Repeat #3 with second cloth.  5) Do not touch your eyes, ears, or mouth with the prep cloth.  6) Allow the wet prep solution to air dry.  7) Discard the  prep cloth and wash your hands with soap and water.   8) Dress in clean bed clothes and sleep on fresh clean bed sheets.   9) You may experience some temporary itching after the prep.    Directions Day of Surgery  1) Repeat steps 1,2,3,4,5,6,7, and 9.   2) Dress in clean clothes before coming to the hospital.    BACTROBAN NASAL OINTMENT  There are many germs normally in your nose. Bactroban is an ointment that will help reduce these germs. Please follow these instructions for Bactroban use:      __1_The day before surgery in the morning  Date_4/7/19_______    _2___The day before surgery in the evening              Date__4/7/19______    _3___The day of surgery in the morning    Date__4/8/19______    **Squirt ½ package of Bactroban Ointment onto a cotton applicator and apply to inside of 1st nostril.  Squirt the remaining Bactroban and apply to the inside of the other nostril.    PERIDEX- ORAL:  Use only if your surgeon has ordered  Use the night before and morning of surgery - Swish, gargle, and spit - do not swallow.

## 2019-03-22 LAB — BACTERIA SPEC AEROBE CULT: NORMAL

## 2019-03-26 ENCOUNTER — TREATMENT (OUTPATIENT)
Dept: PHYSICAL THERAPY | Facility: CLINIC | Age: 79
End: 2019-03-26

## 2019-03-26 DIAGNOSIS — G89.29 CHRONIC PAIN OF BOTH KNEES: Primary | ICD-10-CM

## 2019-03-26 DIAGNOSIS — M25.562 CHRONIC PAIN OF BOTH KNEES: Primary | ICD-10-CM

## 2019-03-26 DIAGNOSIS — M25.561 CHRONIC PAIN OF BOTH KNEES: Primary | ICD-10-CM

## 2019-03-26 PROCEDURE — 97110 THERAPEUTIC EXERCISES: CPT | Performed by: PHYSICAL THERAPIST

## 2019-03-26 NOTE — PROGRESS NOTES
Discharge Report    Patient Name: Kristie Franz       Patient MRN: [id number]  : 1940  Physician:Trevon Hunt MD  Date: 3/26/2019    Encounter Diagnoses   Name Primary?   • Chronic pain of both knees Yes       Treatment has included therapeutic exercise and cryotherapy     Physical Therapy Daily Progress Note    Visit #7    Subjective     Kristie Franz reports: feeling good. Surgery is in 2 weeks.      Objective   See Exercise, Manual, and Modality Logs for complete treatment.       Assessment/Plan  Pt has met goals, will D/C at this time and evaluate post op when ordered.           Manual Therapy:    0     mins  54406;  Therapeutic Exercise:    35     mins  89760;     Neuromuscular Honey:    0    mins  37399;    Therapeutic Activity:     0     mins  10788;     Gait Trainin     mins  60844;     Ultrasound:     0     mins  85363;    Electrical Stimulation:    0     mins  84994 ( );    Timed Treatment:   35   mins   Total Treatment:     45   mins    Danyelle Stokes, PT, DPT  Physical Therapist  [place] License #[id number]                        Assessment:   Progress towards goals: Partially Met    Discharge Reason: Patient achieved goals      Plan of Care: Refer to other services (specify):after TKA planned in 2 weeks    Prognosis: good    Understanding at Discharge: good

## 2019-03-26 NOTE — PROGRESS NOTES
Physical Therapy Daily Progress Note    Visit #7    Subjective     Kristie Franz reports: feeling good. Surgery is in 2 weeks.      Objective   See Exercise, Manual, and Modality Logs for complete treatment.       Assessment/Plan  Pt has met goals, will D/C at this time and evaluate post op when ordered.           Manual Therapy:    0     mins  66576;  Therapeutic Exercise:    35     mins  81989;     Neuromuscular Honey:    0    mins  25009;    Therapeutic Activity:     0     mins  34506;     Gait Trainin     mins  18484;     Ultrasound:     0     mins  89662;    Electrical Stimulation:    0     mins  81927 ( );    Timed Treatment:   35   mins   Total Treatment:     45   mins    Danyelle Stokes, PT, DPT  Physical Therapist  [place] License #[id number]

## 2019-03-28 ENCOUNTER — OFFICE VISIT (OUTPATIENT)
Dept: ORTHOPEDIC SURGERY | Facility: CLINIC | Age: 79
End: 2019-03-28

## 2019-03-28 VITALS — HEIGHT: 61 IN | BODY MASS INDEX: 26.43 KG/M2 | WEIGHT: 140 LBS | TEMPERATURE: 98.1 F

## 2019-03-28 DIAGNOSIS — M17.11 PRIMARY OSTEOARTHRITIS OF RIGHT KNEE: Primary | ICD-10-CM

## 2019-03-28 PROCEDURE — 77077 JOINT SURVEY SINGLE VIEW: CPT | Performed by: ORTHOPAEDIC SURGERY

## 2019-03-28 PROCEDURE — S0260 H&P FOR SURGERY: HCPCS | Performed by: NURSE PRACTITIONER

## 2019-03-28 NOTE — H&P
Patient: Kristie Franz    Date of Admission: 19    YOB: 1940    Medical Record Number: [id number]    Admitting Physician: Dr. Trevon Hunt    Reason for Admission: End Stage Right Knee OA    History of Present Illness: 78 y.o. female presents with severe end stage knee osteoarthritis which has not been responsive to the full compliment of conservative measures. Despite conservative attempts, there is still severe, constant activity limiting pain. Given the severity of the pain, the patient has elected to proceed with knee replacement.    Allergies:   Allergies   Allergen Reactions   • Amoxicillin Rash         Current Medications:  Scheduled Meds:  PRN Meds:.    PMH:     Past Medical History:   Diagnosis Date   • Allergic Amoxicillin    reaction many years ago   • Allergic rhinitis    • Anemia    • Anxiety     low dosage med   • Arthritis    • Back pain    • Depression Since 's death   • Fatigue    • Hyperlipidemia    • Hypertension    • Hypothyroidism    • Insomnia    • Joint pain    • Migraine    • Mood disorder (CMS/HCC)    • Osteopenia    • Urinary frequency        PF/Surg/Soc Hx:     Past Surgical History:   Procedure Laterality Date   • COLONOSCOPY     • WISDOM TOOTH EXTRACTION          Social History     Occupational History   • Not on file   Tobacco Use   • Smoking status: Never Smoker   • Smokeless tobacco: Never Used   Substance and Sexual Activity   • Alcohol use: Yes     Comment: 2 monthly   • Drug use: No   • Sexual activity: Not on file      Social History     Social History Narrative    Vitamin D=39        Family History   Problem Relation Age of Onset   • Diabetes Mother    • Hypertension Mother    • Arthritis Mother    • Heart attack Father          age 67   • Heart disease Father         Heart Attack   • Cancer Brother    • Malig Hyperthermia Neg Hx          Review of Systems:   A 14 point review of systems was performed, pertinent positives discussed above, all other  "systems are negative    Physical Exam: 78 y.o. female  Vital Signs :   Vitals:    03/28/19 1325   Temp: 98.1 °F (36.7 °C)   Weight: 63.5 kg (140 lb)   Height: 154.9 cm (61\")     General: Alert and Oriented x 3, No acute distress.  Psych: mood and affect appropriate; recent and remote memory intact  Eyes: conjunctiva clear; pupils equally round and reactive, sclera nonicteric  CV: no peripheral edema  Resp: normal respiratory effort  Skin: no rashes or wounds; normal turgor  Musculosketetal; pain and crepitance with knee range of motion  Vascular: palpable distal pulses    Xrays:  -3 views (AP, lateral, and sunrise) were reviewed demonstrating end-stage OA with bone on bone articulation.  -A full length AP xray was ordered today for purposes of operative alignment demonstrating end stage arthritic findings. There are no previous full length films for review    Assessment:  End-stage Right knee osteoarthritis. Conservative measures have failed.      Plan:  The plan is to proceed with Right Total Knee Replacement. The patient voiced understanding of the risks, benefits, and alternative forms of treatment that were discussed with Dr Hunt at the time of scheduling.  Patient is been ongoing [facility] after 3-day stay    Vinita Pierson, APRN  3/28/2019        "

## 2019-04-08 ENCOUNTER — HOSPITAL ENCOUNTER (INPATIENT)
Facility: HOSPITAL | Age: 79
LOS: 3 days | Discharge: NURSING, REHAB OR LONG-TERM CARE | End: 2019-04-11
Attending: ORTHOPAEDIC SURGERY | Admitting: ORTHOPAEDIC SURGERY

## 2019-04-08 ENCOUNTER — ANESTHESIA (OUTPATIENT)
Dept: PERIOP | Facility: HOSPITAL | Age: 79
End: 2019-04-08

## 2019-04-08 ENCOUNTER — ANESTHESIA EVENT (OUTPATIENT)
Dept: PERIOP | Facility: HOSPITAL | Age: 79
End: 2019-04-08

## 2019-04-08 ENCOUNTER — APPOINTMENT (OUTPATIENT)
Dept: GENERAL RADIOLOGY | Facility: HOSPITAL | Age: 79
End: 2019-04-08

## 2019-04-08 DIAGNOSIS — M17.11 PRIMARY OSTEOARTHRITIS OF RIGHT KNEE: ICD-10-CM

## 2019-04-08 DIAGNOSIS — R26.2 DIFFICULTY WALKING: Primary | ICD-10-CM

## 2019-04-08 PROBLEM — M17.9 OA (OSTEOARTHRITIS) OF KNEE: Status: ACTIVE | Noted: 2019-04-08

## 2019-04-08 PROCEDURE — C1776 JOINT DEVICE (IMPLANTABLE): HCPCS | Performed by: ORTHOPAEDIC SURGERY

## 2019-04-08 PROCEDURE — 73560 X-RAY EXAM OF KNEE 1 OR 2: CPT

## 2019-04-08 PROCEDURE — 25010000002 PROPOFOL 10 MG/ML EMULSION: Performed by: NURSE ANESTHETIST, CERTIFIED REGISTERED

## 2019-04-08 PROCEDURE — 97110 THERAPEUTIC EXERCISES: CPT

## 2019-04-08 PROCEDURE — C1713 ANCHOR/SCREW BN/BN,TIS/BN: HCPCS | Performed by: ORTHOPAEDIC SURGERY

## 2019-04-08 PROCEDURE — 25010000002 KETOROLAC TROMETHAMINE PER 15 MG: Performed by: ORTHOPAEDIC SURGERY

## 2019-04-08 PROCEDURE — 25010000002 DEXAMETHASONE PER 1 MG: Performed by: NURSE ANESTHETIST, CERTIFIED REGISTERED

## 2019-04-08 PROCEDURE — 97161 PT EVAL LOW COMPLEX 20 MIN: CPT

## 2019-04-08 PROCEDURE — 25010000002 FENTANYL CITRATE (PF) 100 MCG/2ML SOLUTION: Performed by: NURSE ANESTHETIST, CERTIFIED REGISTERED

## 2019-04-08 PROCEDURE — 25010000002 ONDANSETRON PER 1 MG: Performed by: NURSE ANESTHETIST, CERTIFIED REGISTERED

## 2019-04-08 PROCEDURE — 25010000003 CEFAZOLIN IN DEXTROSE 2-4 GM/100ML-% SOLUTION: Performed by: ORTHOPAEDIC SURGERY

## 2019-04-08 PROCEDURE — 0SRC069 REPLACEMENT OF RIGHT KNEE JOINT WITH OXIDIZED ZIRCONIUM ON POLYETHYLENE SYNTHETIC SUBSTITUTE, CEMENTED, OPEN APPROACH: ICD-10-PCS | Performed by: ORTHOPAEDIC SURGERY

## 2019-04-08 PROCEDURE — 25010000003 BUPIVACAINE LIPOSOME 1.3 % SUSPENSION 20 ML VIAL: Performed by: ORTHOPAEDIC SURGERY

## 2019-04-08 PROCEDURE — 25010000002 VANCOMYCIN PER 500 MG: Performed by: ORTHOPAEDIC SURGERY

## 2019-04-08 PROCEDURE — C9290 INJ, BUPIVACAINE LIPOSOME: HCPCS | Performed by: ORTHOPAEDIC SURGERY

## 2019-04-08 PROCEDURE — 27447 TOTAL KNEE ARTHROPLASTY: CPT | Performed by: ORTHOPAEDIC SURGERY

## 2019-04-08 DEVICE — GENESIS II NON-POROUS TIBIAL                                    BASEPLATE SIZE 3 RIGHT
Type: IMPLANTABLE DEVICE | Site: KNEE | Status: FUNCTIONAL
Brand: GENESIS II

## 2019-04-08 DEVICE — LEGION CRUCIATE RETAINING OXINIUM                                    FEMORAL SIZE 4 RIGHT
Type: IMPLANTABLE DEVICE | Site: KNEE | Status: FUNCTIONAL
Brand: LEGION

## 2019-04-08 DEVICE — PALACOS® R IS A FAST-CURING, RADIOPAQUE, POLY(METHYL METHACRYLATE)-BASED BONE CEMENT.PALACOS ® R CONTAINS THE X-RAY CONTRAST MEDIUM ZIRCONIUM DIOXIDE. TO IMPROVE VISIBILITY IN THE SURGICAL FIELD PALACOS ® R HAS BEEN COLOURED WITH CHLOROPHYLL (E141). THE BONE CEMENT IS PREPARED DIRECTLY BEFORE USE BY MIXING A POLYMER POWDER COMPONENT WITH A LIQUID MONOMER COMPONENT. A DUCTILE DOUGH FORMS WHICH CURES WITHIN A FEW MINUTES.
Type: IMPLANTABLE DEVICE | Site: KNEE | Status: FUNCTIONAL
Brand: PALACOS®

## 2019-04-08 DEVICE — IMPLANTABLE DEVICE: Type: IMPLANTABLE DEVICE | Site: KNEE | Status: FUNCTIONAL

## 2019-04-08 DEVICE — GENESIS II CRUCIATE RETAINING DEEP                                    FLEXION INSERT SIZE 3-4 9MM
Type: IMPLANTABLE DEVICE | Site: KNEE | Status: FUNCTIONAL
Brand: GENESIS II

## 2019-04-08 DEVICE — GENESIS II BICONVEX PATELLA 26MM
Type: IMPLANTABLE DEVICE | Site: KNEE | Status: FUNCTIONAL
Brand: GENESIS II

## 2019-04-08 RX ORDER — MELOXICAM 15 MG/1
15 TABLET ORAL ONCE
Status: COMPLETED | OUTPATIENT
Start: 2019-04-08 | End: 2019-04-08

## 2019-04-08 RX ORDER — HYDRALAZINE HYDROCHLORIDE 20 MG/ML
5 INJECTION INTRAMUSCULAR; INTRAVENOUS
Status: DISCONTINUED | OUTPATIENT
Start: 2019-04-08 | End: 2019-04-08 | Stop reason: HOSPADM

## 2019-04-08 RX ORDER — SODIUM CHLORIDE 0.9 % (FLUSH) 0.9 %
1-10 SYRINGE (ML) INJECTION AS NEEDED
Status: DISCONTINUED | OUTPATIENT
Start: 2019-04-08 | End: 2019-04-08 | Stop reason: HOSPADM

## 2019-04-08 RX ORDER — FENTANYL CITRATE 50 UG/ML
INJECTION, SOLUTION INTRAMUSCULAR; INTRAVENOUS AS NEEDED
Status: DISCONTINUED | OUTPATIENT
Start: 2019-04-08 | End: 2019-04-08 | Stop reason: SURG

## 2019-04-08 RX ORDER — MELOXICAM 15 MG/1
15 TABLET ORAL DAILY
Status: DISCONTINUED | OUTPATIENT
Start: 2019-04-09 | End: 2019-04-11 | Stop reason: HOSPADM

## 2019-04-08 RX ORDER — BISACODYL 10 MG
10 SUPPOSITORY, RECTAL RECTAL DAILY PRN
Status: DISCONTINUED | OUTPATIENT
Start: 2019-04-08 | End: 2019-04-11 | Stop reason: HOSPADM

## 2019-04-08 RX ORDER — FENTANYL CITRATE 50 UG/ML
50 INJECTION, SOLUTION INTRAMUSCULAR; INTRAVENOUS
Status: DISCONTINUED | OUTPATIENT
Start: 2019-04-08 | End: 2019-04-08 | Stop reason: HOSPADM

## 2019-04-08 RX ORDER — UREA 10 %
3 LOTION (ML) TOPICAL NIGHTLY PRN
Status: DISCONTINUED | OUTPATIENT
Start: 2019-04-08 | End: 2019-04-11 | Stop reason: HOSPADM

## 2019-04-08 RX ORDER — LEVOTHYROXINE SODIUM 0.05 MG/1
50 TABLET ORAL EVERY MORNING
Status: DISCONTINUED | OUTPATIENT
Start: 2019-04-09 | End: 2019-04-11 | Stop reason: HOSPADM

## 2019-04-08 RX ORDER — CLINDAMYCIN PHOSPHATE 900 MG/50ML
900 INJECTION INTRAVENOUS ONCE
Status: COMPLETED | OUTPATIENT
Start: 2019-04-08 | End: 2019-04-08

## 2019-04-08 RX ORDER — ACETAMINOPHEN 10 MG/ML
1000 INJECTION, SOLUTION INTRAVENOUS ONCE
Status: COMPLETED | OUTPATIENT
Start: 2019-04-08 | End: 2019-04-08

## 2019-04-08 RX ORDER — DEXAMETHASONE SODIUM PHOSPHATE 4 MG/ML
INJECTION, SOLUTION INTRA-ARTICULAR; INTRALESIONAL; INTRAMUSCULAR; INTRAVENOUS; SOFT TISSUE AS NEEDED
Status: DISCONTINUED | OUTPATIENT
Start: 2019-04-08 | End: 2019-04-08 | Stop reason: SURG

## 2019-04-08 RX ORDER — PROPOFOL 10 MG/ML
VIAL (ML) INTRAVENOUS CONTINUOUS PRN
Status: DISCONTINUED | OUTPATIENT
Start: 2019-04-08 | End: 2019-04-08 | Stop reason: SURG

## 2019-04-08 RX ORDER — HYDROMORPHONE HYDROCHLORIDE 1 MG/ML
0.5 INJECTION, SOLUTION INTRAMUSCULAR; INTRAVENOUS; SUBCUTANEOUS
Status: DISCONTINUED | OUTPATIENT
Start: 2019-04-08 | End: 2019-04-08 | Stop reason: HOSPADM

## 2019-04-08 RX ORDER — DOCUSATE SODIUM 100 MG/1
100 CAPSULE, LIQUID FILLED ORAL 2 TIMES DAILY
Status: DISCONTINUED | OUTPATIENT
Start: 2019-04-08 | End: 2019-04-11 | Stop reason: HOSPADM

## 2019-04-08 RX ORDER — BUPIVACAINE HYDROCHLORIDE 7.5 MG/ML
INJECTION, SOLUTION EPIDURAL; RETROBULBAR
Status: COMPLETED | OUTPATIENT
Start: 2019-04-08 | End: 2019-04-08

## 2019-04-08 RX ORDER — KETOROLAC TROMETHAMINE 30 MG/ML
30 INJECTION, SOLUTION INTRAMUSCULAR; INTRAVENOUS EVERY 8 HOURS
Status: DISPENSED | OUTPATIENT
Start: 2019-04-08 | End: 2019-04-09

## 2019-04-08 RX ORDER — HYDROCODONE BITARTRATE AND ACETAMINOPHEN 7.5; 325 MG/1; MG/1
1 TABLET ORAL ONCE AS NEEDED
Status: DISCONTINUED | OUTPATIENT
Start: 2019-04-08 | End: 2019-04-08 | Stop reason: HOSPADM

## 2019-04-08 RX ORDER — VANCOMYCIN HYDROCHLORIDE 1 G/200ML
15 INJECTION, SOLUTION INTRAVENOUS ONCE
Status: COMPLETED | OUTPATIENT
Start: 2019-04-08 | End: 2019-04-08

## 2019-04-08 RX ORDER — CITALOPRAM 20 MG/1
20 TABLET ORAL EVERY MORNING
Status: DISCONTINUED | OUTPATIENT
Start: 2019-04-09 | End: 2019-04-11 | Stop reason: HOSPADM

## 2019-04-08 RX ORDER — LATANOPROST 50 UG/ML
1 SOLUTION/ DROPS OPHTHALMIC NIGHTLY
Status: DISCONTINUED | OUTPATIENT
Start: 2019-04-08 | End: 2019-04-11 | Stop reason: HOSPADM

## 2019-04-08 RX ORDER — HYDROCODONE BITARTRATE AND ACETAMINOPHEN 7.5; 325 MG/1; MG/1
1 TABLET ORAL EVERY 4 HOURS PRN
Status: DISCONTINUED | OUTPATIENT
Start: 2019-04-08 | End: 2019-04-11 | Stop reason: HOSPADM

## 2019-04-08 RX ORDER — ASPIRIN 325 MG
325 TABLET, DELAYED RELEASE (ENTERIC COATED) ORAL EVERY 12 HOURS SCHEDULED
Status: DISCONTINUED | OUTPATIENT
Start: 2019-04-09 | End: 2019-04-11 | Stop reason: HOSPADM

## 2019-04-08 RX ORDER — ONDANSETRON 2 MG/ML
4 INJECTION INTRAMUSCULAR; INTRAVENOUS ONCE AS NEEDED
Status: DISCONTINUED | OUTPATIENT
Start: 2019-04-08 | End: 2019-04-08 | Stop reason: HOSPADM

## 2019-04-08 RX ORDER — MAGNESIUM HYDROXIDE 1200 MG/15ML
LIQUID ORAL AS NEEDED
Status: DISCONTINUED | OUTPATIENT
Start: 2019-04-08 | End: 2019-04-08 | Stop reason: HOSPADM

## 2019-04-08 RX ORDER — ACETAMINOPHEN 325 MG/1
650 TABLET ORAL ONCE AS NEEDED
Status: DISCONTINUED | OUTPATIENT
Start: 2019-04-08 | End: 2019-04-08 | Stop reason: HOSPADM

## 2019-04-08 RX ORDER — HYDROCODONE BITARTRATE AND ACETAMINOPHEN 7.5; 325 MG/1; MG/1
2 TABLET ORAL EVERY 4 HOURS PRN
Status: DISCONTINUED | OUTPATIENT
Start: 2019-04-08 | End: 2019-04-11 | Stop reason: HOSPADM

## 2019-04-08 RX ORDER — PREGABALIN 75 MG/1
150 CAPSULE ORAL ONCE
Status: COMPLETED | OUTPATIENT
Start: 2019-04-08 | End: 2019-04-08

## 2019-04-08 RX ORDER — LIDOCAINE HYDROCHLORIDE 10 MG/ML
0.5 INJECTION, SOLUTION EPIDURAL; INFILTRATION; INTRACAUDAL; PERINEURAL ONCE AS NEEDED
Status: DISCONTINUED | OUTPATIENT
Start: 2019-04-08 | End: 2019-04-08 | Stop reason: HOSPADM

## 2019-04-08 RX ORDER — FAMOTIDINE 10 MG/ML
20 INJECTION, SOLUTION INTRAVENOUS ONCE
Status: COMPLETED | OUTPATIENT
Start: 2019-04-08 | End: 2019-04-08

## 2019-04-08 RX ORDER — PANTOPRAZOLE SODIUM 40 MG/1
40 TABLET, DELAYED RELEASE ORAL
Status: DISCONTINUED | OUTPATIENT
Start: 2019-04-09 | End: 2019-04-11 | Stop reason: HOSPADM

## 2019-04-08 RX ORDER — SODIUM CHLORIDE, SODIUM LACTATE, POTASSIUM CHLORIDE, CALCIUM CHLORIDE 600; 310; 30; 20 MG/100ML; MG/100ML; MG/100ML; MG/100ML
9 INJECTION, SOLUTION INTRAVENOUS CONTINUOUS
Status: DISCONTINUED | OUTPATIENT
Start: 2019-04-08 | End: 2019-04-08 | Stop reason: HOSPADM

## 2019-04-08 RX ORDER — CEFAZOLIN SODIUM 2 G/100ML
2 INJECTION, SOLUTION INTRAVENOUS EVERY 8 HOURS
Status: COMPLETED | OUTPATIENT
Start: 2019-04-08 | End: 2019-04-09

## 2019-04-08 RX ORDER — LIDOCAINE HYDROCHLORIDE 20 MG/ML
INJECTION, SOLUTION INFILTRATION; PERINEURAL AS NEEDED
Status: DISCONTINUED | OUTPATIENT
Start: 2019-04-08 | End: 2019-04-08 | Stop reason: SURG

## 2019-04-08 RX ORDER — ONDANSETRON 2 MG/ML
INJECTION INTRAMUSCULAR; INTRAVENOUS AS NEEDED
Status: DISCONTINUED | OUTPATIENT
Start: 2019-04-08 | End: 2019-04-08 | Stop reason: SURG

## 2019-04-08 RX ORDER — ONDANSETRON 4 MG/1
4 TABLET, FILM COATED ORAL EVERY 6 HOURS PRN
Status: DISCONTINUED | OUTPATIENT
Start: 2019-04-08 | End: 2019-04-11 | Stop reason: HOSPADM

## 2019-04-08 RX ORDER — TRIAMTERENE AND HYDROCHLOROTHIAZIDE 37.5; 25 MG/1; MG/1
1 TABLET ORAL EVERY OTHER DAY
Status: DISCONTINUED | OUTPATIENT
Start: 2019-04-08 | End: 2019-04-11 | Stop reason: HOSPADM

## 2019-04-08 RX ORDER — FLUMAZENIL 0.1 MG/ML
0.2 INJECTION INTRAVENOUS AS NEEDED
Status: DISCONTINUED | OUTPATIENT
Start: 2019-04-08 | End: 2019-04-08 | Stop reason: HOSPADM

## 2019-04-08 RX ORDER — ONDANSETRON 2 MG/ML
4 INJECTION INTRAMUSCULAR; INTRAVENOUS EVERY 6 HOURS PRN
Status: DISCONTINUED | OUTPATIENT
Start: 2019-04-08 | End: 2019-04-11 | Stop reason: HOSPADM

## 2019-04-08 RX ORDER — NALOXONE HCL 0.4 MG/ML
0.2 VIAL (ML) INJECTION AS NEEDED
Status: DISCONTINUED | OUTPATIENT
Start: 2019-04-08 | End: 2019-04-08 | Stop reason: HOSPADM

## 2019-04-08 RX ORDER — LABETALOL HYDROCHLORIDE 5 MG/ML
5 INJECTION, SOLUTION INTRAVENOUS
Status: DISCONTINUED | OUTPATIENT
Start: 2019-04-08 | End: 2019-04-08 | Stop reason: HOSPADM

## 2019-04-08 RX ORDER — ONDANSETRON 4 MG/1
4 TABLET, ORALLY DISINTEGRATING ORAL EVERY 6 HOURS PRN
Status: DISCONTINUED | OUTPATIENT
Start: 2019-04-08 | End: 2019-04-11 | Stop reason: HOSPADM

## 2019-04-08 RX ADMIN — SODIUM CHLORIDE, POTASSIUM CHLORIDE, SODIUM LACTATE AND CALCIUM CHLORIDE 9 ML/HR: 600; 310; 30; 20 INJECTION, SOLUTION INTRAVENOUS at 08:32

## 2019-04-08 RX ADMIN — PROPOFOL 25 MCG/KG/MIN: 10 INJECTION, EMULSION INTRAVENOUS at 10:25

## 2019-04-08 RX ADMIN — SODIUM CHLORIDE, POTASSIUM CHLORIDE, SODIUM LACTATE AND CALCIUM CHLORIDE: 600; 310; 30; 20 INJECTION, SOLUTION INTRAVENOUS at 10:50

## 2019-04-08 RX ADMIN — MUPIROCIN 10 APPLICATION: 20 OINTMENT TOPICAL at 21:55

## 2019-04-08 RX ADMIN — KETOROLAC TROMETHAMINE 30 MG: 30 INJECTION INTRAMUSCULAR; INTRAVENOUS at 17:26

## 2019-04-08 RX ADMIN — CLINDAMYCIN PHOSPHATE 900 MG: 900 INJECTION INTRAVENOUS at 10:20

## 2019-04-08 RX ADMIN — SODIUM CHLORIDE, POTASSIUM CHLORIDE, SODIUM LACTATE AND CALCIUM CHLORIDE 500 ML: 600; 310; 30; 20 INJECTION, SOLUTION INTRAVENOUS at 07:49

## 2019-04-08 RX ADMIN — BUPIVACAINE HYDROCHLORIDE 1.4 ML: 7.5 INJECTION, SOLUTION EPIDURAL; RETROBULBAR at 10:15

## 2019-04-08 RX ADMIN — DEXAMETHASONE SODIUM PHOSPHATE 8 MG: 4 INJECTION INTRA-ARTICULAR; INTRALESIONAL; INTRAMUSCULAR; INTRAVENOUS; SOFT TISSUE at 10:28

## 2019-04-08 RX ADMIN — LATANOPROST 1 DROP: 50 SOLUTION OPHTHALMIC at 21:55

## 2019-04-08 RX ADMIN — ACETAMINOPHEN 1000 MG: 10 INJECTION, SOLUTION INTRAVENOUS at 10:25

## 2019-04-08 RX ADMIN — HYDROCODONE BITARTRATE AND ACETAMINOPHEN 2 TABLET: 7.5; 325 TABLET ORAL at 14:13

## 2019-04-08 RX ADMIN — POLYETHYLENE GLYCOL 3350 17 G: 17 POWDER, FOR SOLUTION ORAL at 21:55

## 2019-04-08 RX ADMIN — ONDANSETRON 4 MG: 2 INJECTION INTRAMUSCULAR; INTRAVENOUS at 11:15

## 2019-04-08 RX ADMIN — FENTANYL CITRATE 50 MCG: 50 INJECTION INTRAMUSCULAR; INTRAVENOUS at 10:10

## 2019-04-08 RX ADMIN — FAMOTIDINE 20 MG: 10 INJECTION INTRAVENOUS at 08:32

## 2019-04-08 RX ADMIN — MELOXICAM 15 MG: 15 TABLET ORAL at 07:49

## 2019-04-08 RX ADMIN — VANCOMYCIN HYDROCHLORIDE 1000 MG: 1 INJECTION, SOLUTION INTRAVENOUS at 07:49

## 2019-04-08 RX ADMIN — LIDOCAINE HYDROCHLORIDE 60 MG: 20 INJECTION, SOLUTION INFILTRATION; PERINEURAL at 10:25

## 2019-04-08 RX ADMIN — CEFAZOLIN SODIUM 2 G: 2 INJECTION, SOLUTION INTRAVENOUS at 17:26

## 2019-04-08 RX ADMIN — PREGABALIN 150 MG: 75 CAPSULE ORAL at 07:49

## 2019-04-08 RX ADMIN — DOCUSATE SODIUM 100 MG: 100 CAPSULE, LIQUID FILLED ORAL at 21:54

## 2019-04-08 NOTE — ANESTHESIA PROCEDURE NOTES
Spinal Block    Pre-sedation assessment completed: 4/8/2019 10:15 AM    Patient reassessed immediately prior to procedure    Patient location during procedure: OR  Start Time: 4/8/2019 10:12 AM  Stop Time: 4/8/2019 10:15 AM  Indication:at surgeon's request  Performed By  Anesthesiologist: Artemio Jernigan MD  Preanesthetic Checklist  Completed: patient identified, surgical consent, pre-op evaluation, timeout performed, IV checked, risks and benefits discussed and monitors and equipment checked  Spinal Block Prep:  Patient Position:sitting  Sterile Tech:cap, gloves, gown, mask and sterile barriers  Prep:Betadine  Patient Monitoring:blood pressure monitoring, continuous pulse oximetry and EKG  Spinal Block Procedure  Approach:midline  Guidance:palpation technique  Location:L5-S1  Needle Type:Sprotte  Needle Gauge:24 G  Placement of Spinal needle event:cerebrospinal fluid aspirated  Paresthesia: no  Fluid Appearance:clear  Medications: bupivacaine PF (MARCAINE) 0.75 % injection, 1.4 mL   Post Assessment  Patient Tolerance:patient tolerated the procedure well with no apparent complications  Complications no

## 2019-04-08 NOTE — OP NOTE
Name: Kristie Franz  YOB: 1940    DATE OF SURGERY: 4/8/2019    PREOPERATIVE DIAGNOSIS: Right knee end-stage osteoarthritis    POSTOPERATIVE DIAGNOSIS: Right knee end-stage osteoarthritis    PROCEDURE PERFORMED: Right total knee replacement    SURGEON: Trevon Hunt M.D.    ASSISTANT: ISRAEL RAMIREZ    IMPLANTS: [employer]:     Implant Name Type Inv. Item Serial No.  Lot No. LRB No. Used   CMT BONE PALACOS R HI/VISC 1X40 - [id number] Implant CMT BONE PALACOS R HI/VISC 1X40  [facility] [id number] Right 1   COMP FEM LEGION OXINIUM CR SZ4 RT - [id number] Implant COMP FEM LEGION OXINIUM CR SZ4 RT  [employer] [id number] Right 1   BASE TIB/KN GEN2 NONPOR TI SZ3 RT - [id number] Implant BASE TIB/KN GEN2 NONPOR TI SZ3 RT  [employer] [id number] Right 1   PAT GEN2 BICONVEX [id number] - [id number] Implant PAT GEN2 B[employer]ONVEX [id number]  [employer]W [id number] Right 1   INSRT KN CR FLX DEEP GEN2 SZ3 4 9MM - [id number] Implant INSRT KN CR FLX DEEP GEN2 SZ3 4 9MM  [employer] [id number] Right 1       Estimated Blood Loss: 200cc  Specimens : none  Complications: none    DESCRIPTION OF PROCEDURE: The patient was taken to the operating room and placed in the supine position. A sequential compression device was carefully placed on the non-operative leg. Preoperative antibiotics were administered. Surgical time out was performed. After adequate induction of anesthesia, the leg was prepped and draped in the usual sterile fashion, exsanguinated with an Esmarch bandage and the tourniquet inflated to 250 mmHg. A midline incision was performed followed by a medial parapatellar arthrotomy. The patella was subluxed laterally.  A portion of the fat pad, ACL, and anterior horns of the meniscus were excised. The drill hole was placed in the distal femur and the canal was the irrigated and suctioned. The IM alma was placed and a 5 degree distal valgus cut was performed on the femur. The  femur was then sized with a sizing guide. The femoral cutting block was placed and all femoral cuts were performed. The proximal tibia was exposed. We used the extramedullary tibial cutting guide set for removal of 9mm of bone off the high side. The tibial cut was performed. The posterior horns of the menisci were excised. The posterior osteophytes were removed. Flexion extension blocks were then used to balance the knee. The tibial cut surface was then sized with the sizing templates and the tibial and femoral trial were then placed. The knee was placed in full extension and then the tibial tray rotation was then matched to the femoral rotation and marked.    Attention was then placed to the patella. The patella was noted to track centrally through range of motion. The patella was then sized with the trials. The thickness of the patella was then measured. The patella was resurfaced and the surrounding osteophytes were removed. The preoperative thickness was reproduced. The patella tracked centrally through range of motion.   At this point all trial components were removed, the knee was copiously irrigated with pulsed lavage, and the knee was injected with anesthetic cocktail solution. The cut surfaces were then dried with clean lap sponges, and the components were cemented tibia, followed by femur, then patella. The knee was held in full extension and all excess cement was removed. The knee was held still until the cement had completely hardened. We then placed the trial polyethylene spacer which resulted in full extension and excellent flexion-extension balance. We placed the final polyethylene spacer.   The knee was then copiously irrigated. The tourniquet was then released. There was excellent hemostasis. We placed a one-eighth inch Hemovac drain. We closed the knee in multiple layers in standard fashion. Sterile dressing were applied. At the end of the case, the sponge and needle counts were reported as being  correct. There were no known complications. The patient was then transported to the recovery room.      Trevon Hunt M.D.   actual

## 2019-04-08 NOTE — THERAPY EVALUATION
Acute Care - Physical Therapy Initial Evaluation  [facility]     Patient Name: Kristie Franz  : 1940  MRN: [id number]  Today's Date: 2019   Onset of Illness/Injury or Date of Surgery: 19  Date of Referral to PT: 19  Referring Physician: Trevon Aguiar      Admit Date: 2019    Visit Dx:     ICD-10-CM ICD-9-CM   1. Difficulty walking R26.2 719.7   2. Primary osteoarthritis of right knee M17.11 715.16     Patient Active Problem List   Diagnosis   • Hypertension   • Hyperlipidemia   • Mood disorder (CMS/HCC)   • Allergic rhinitis   • Osteopenia   • Insomnia   • Chronic pain of right knee   • Nocturia   • Chronic fatigue   • Chronic pain of left knee   • Other microscopic hematuria   • Acquired hypothyroidism   • Hypothyroidism   • Mild incontinence   • Primary osteoarthritis of right knee   • OA (osteoarthritis) of knee     Past Medical History:   Diagnosis Date   • Allergic Amoxicillin    reaction many years ago   • Allergic rhinitis    • Anemia    • Anxiety     low dosage med   • Arthritis    • Back pain    • Depression Since 's death   • Fatigue    • Hyperlipidemia    • Hypertension    • Hypothyroidism    • Insomnia    • Joint pain    • Migraine    • Mood disorder (CMS/HCC)    • Osteopenia    • Urinary frequency      Past Surgical History:   Procedure Laterality Date   • COLONOSCOPY     • WISDOM TOOTH EXTRACTION          PT ASSESSMENT (last 12 hours)      Physical Therapy Evaluation     Row Name 19 1500          PT Evaluation Time/Intention    Subjective Information  complains of;pain;fatigue;weakness  -MS     Document Type  evaluation  -MS     Mode of Treatment  physical therapy;individual therapy  -MS     Patient Effort  good  -MS     Row Name 19 1500          General Information    Onset of Illness/Injury or Date of Surgery  19  -MS     Referring Physician  Trevon Aguiar  -MS     Patient Observations  agree to therapy;cooperative  -MS     Prior Level of  Function  independent:  -MS     Equipment Currently Used at Home  none  -MS     Pertinent History of Current Functional Problem  Pt. is s/p Right TKR  -MS     Existing Precautions/Restrictions  fall  (Significant)   -MS     Risks Reviewed  patient and family:  -MS     Benefits Reviewed  patient and family:  -MS     Barriers to Rehab  none identified  -MS     Row Name 04/08/19 1500          Cognitive Assessment/Intervention- PT/OT    Orientation Status (Cognition)  oriented x 3  -MS     Follows Commands (Cognition)  WNL  -MS     Personal Safety Interventions  fall prevention program maintained;gait belt;nonskid shoes/slippers when out of bed;supervised activity  -MS     Row Name 04/08/19 1500          Mobility Assessment/Treatment    Extremity Weight-bearing Status  right lower extremity  -MS     Right Lower Extremity (Weight-bearing Status)  weight-bearing as tolerated (WBAT)  -MS     Row Name 04/08/19 1500          Bed Mobility Assessment/Treatment    Bed Mobility Assessment/Treatment  supine-sit;sit-supine  -MS     Supine-Sit Gove (Bed Mobility)  contact guard  -MS     Row Name 04/08/19 1500          Transfer Assessment/Treatment    Transfer Assessment/Treatment  sit-stand transfer;stand-sit transfer  -MS     Sit-Stand Gove (Transfers)  minimum assist (75% patient effort);2 person assist  -MS     Stand-Sit Gove (Transfers)  minimum assist (75% patient effort);2 person assist  -MS     Row Name 04/08/19 1500          Sit-Stand Transfer    Assistive Device (Sit-Stand Transfers)  walker, front-wheeled  -MS     Row Name 04/08/19 1500          Stand-Sit Transfer    Assistive Device (Stand-Sit Transfers)  walker, front-wheeled  -MS     Row Name 04/08/19 1500          Gait/Stairs Assessment/Training    Gove Level (Gait)  minimum assist (75% patient effort);2 person assist  -MS     Assistive Device (Gait)  walker, front-wheeled  -MS     Distance in Feet (Gait)  5 feet  -MS     Pattern (Gait)   step-to  -MS     Deviations/Abnormal Patterns (Gait)  antalgic;stride length decreased  -MS     Bilateral Gait Deviations  knee buckling, left side;knee buckling, right side  -MS     Comment (Gait/Stairs)  Pt. with spinal nerve block. Very unsteady with upright mobility.   -MS     Row Name 04/08/19 1500          General ROM    GENERAL ROM COMMENTS  BUE/LLE (WFL's)  -MS     Row Name 04/08/19 1500          MMT (Manual Muscle Testing)    General MMT Comments  BUE/LLE (3+/5)  -MS     Row Name 04/08/19 1500          Therapeutic Exercise    Comment (Therapeutic Exercise)  Right TKR protocol x 10 reps completed with assist.  -MS     Row Name 04/08/19 1500          Pain Assessment    Additional Documentation  Pain Scale: Numbers Pre/Post-Treatment (Group)  -MS     Row Name 04/08/19 1500          Pain Scale: Numbers Pre/Post-Treatment    Pain Scale: Numbers, Pretreatment  2/10  -MS     Pain Scale: Numbers, Post-Treatment  2/10  -MS     Pain Location - Side  Right  -MS     Pain Location  knee  -MS     Pain Intervention(s)  Medication (See MAR);Cold applied;Repositioned;Elevated;Rest  -MS     Row Name             Wound 04/08/19 1058 Right knee incision    Wound - Properties Group Date first assessed: 04/08/19  -SD Time first assessed: 1058  -SD Side: Right  -SD Location: knee  -SD Type: incision  -SD    Row Name 04/08/19 1500          Physical Therapy Clinical Impression    Date of Referral to PT  04/08/19  -MS     Criteria for Skilled Interventions Met (PT Clinical Impression)  treatment indicated  -MS     Rehab Potential (PT Clinical Summary)  good, to achieve stated therapy goals  -MS     Row Name 04/08/19 1500          Physical Therapy Goals    Bed Mobility Goal Selection (PT)  bed mobility, PT goal 1  -MS     Transfer Goal Selection (PT)  transfer, PT goal 1  -MS     Gait Training Goal Selection (PT)  gait training, PT goal 1  -MS     ROM Goal Selection (PT)  ROM, PT goal 1  -MS     Additional Documentation  ROM Goal  Selection (PT) (Row)  -MS     Row Name 04/08/19 1500          Bed Mobility Goal 1 (PT)    Activity/Assistive Device (Bed Mobility Goal 1, PT)  bed mobility activities, all  -MS     Marion Level/Cues Needed (Bed Mobility Goal 1, PT)  independent  -MS     Time Frame (Bed Mobility Goal 1, PT)  long term goal (LTG);3 days  -MS     Row Name 04/08/19 1500          Transfer Goal 1 (PT)    Activity/Assistive Device (Transfer Goal 1, PT)  transfers, all;walker, rolling  -MS     Mar[place] Level/Cues Needed (Transfer Goal 1, PT)  independent  -MS     Time Frame (Transfer Goal 1, PT)  long term goal (LTG);3 days  -MS     Row Name 04/08/19 1500          Gait Training Goal 1 (PT)    Activity/Assistive Device (Gait Training Goal 1, PT)  gait (walking locomotion);walker, rolling  -MS     Marion Level (Gait Training Goal 1, PT)  independent  -MS     Distance (Gait Goal 1, PT)  100 feet  -MS     Time Frame (Gait Training Goal 1, PT)  long term goal (LTG);3 days  -MS     Row Name 04/08/19 1500          ROM Goal 1 (PT)    ROM Goal 1 (PT)  Right knee AROM (5, 85)  -MS     Time Frame (ROM Goal 1, PT)  long term goal (LTG);3 days  -MS     Row Name 04/08/19 1500          Positioning and Restraints    Pre-Treatment Position  in bed  -MS     Post Treatment Position  chair  -MS     In Chair  notified nsg;reclined;sitting;call light within reach;encouraged to call for assist;with family/caregiver All lines intact.   -MS       User Key  (r) = Recorded By, (t) = Taken By, (c) = Cosigned By    Initials Name Provider Type    Celia Amos, RN Registered Nurse    Amadou Kumar, PT Physical Therapist        Physical Therapy Education     Title: PT OT SLP Therapies (Done)     Topic: Physical Therapy (Done)     Point: Mobility training (Done)     Learning Progress Summary           Patient Acceptance, E,D, VU,NR by MS at 4/8/2019  3:04 PM                   Point: Home exercise program (Done)     Learning Progress Summary            Patient Acceptance, E,D, VU,NR by MS at 4/8/2019  3:04 PM                   Point: Body mechanics (Done)     Learning Progress Summary           Patient Acceptance, E,D, VU,NR by MS at 4/8/2019  3:04 PM                   Point: Precautions (Done)     Learning Progress Summary           Patient Acceptance, E,D, VU,NR by MS at 4/8/2019  3:04 PM                               User Key     Initials Effective Dates Name Provider Type Discipline    MS 04/03/18 -  Amadou Avelar, PT Physical Therapist PT              PT Recommendation and Plan  Anticipated Discharge Disposition (PT): skilled nursing facility  Planned Therapy Interventions (PT Eval): balance training, bed mobility training, gait training, home exercise program, patient/family education, postural re-education, ROM (range of motion), strengthening, transfer training  Therapy Frequency (PT Clinical Impression): 2 times/day  Outcome Summary/Treatment Plan (PT)  Anticipated Discharge Disposition (PT): skilled nursing facility  Plan of Care Reviewed With: patient, family  Outcome Summary: Pt. presents with typical post op TKR impairments including decreased strength, balance, and AROM.  Pt. will benefit from skilled inpt. P.T. to address her functional deficits and to assist pt. in regaining her maximum level of independence with functional mobility.  Outcome Measures     Row Name 04/08/19 1500             How much help from another person do you currently need...    Turning from your back to your side while in flat bed without using bedrails?  3  -MS      Moving from lying on back to sitting on the side of a flat bed without bedrails?  3  -MS      Moving to and from a bed to a chair (including a wheelchair)?  2  -MS      Standing up from a chair using your arms (e.g., wheelchair, bedside chair)?  2  -MS      Climbing 3-5 steps with a railing?  2  -MS      To walk in hospital room?  2  -MS      AM-PAC 6 Clicks Score  14  -MS         Functional  Assessment    Outcome Measure Options  AM-PAC 6 Clicks Basic Mobility (PT)  -MS        User Key  (r) = Recorded By, (t) = Taken By, (c) = Cosigned By    Initials Name Provider Type    Amadou Kumar, PT Physical Therapist         Time Calculation:   PT Charges     Row Name 04/08/19 1505             Time Calculation    Start Time  1435  -MS      Stop Time  1456  -MS      Time Calculation (min)  21 min  -MS      PT Received On  04/08/19  -MS      PT - Next Appointment  04/09/19  -MS      PT Goal Re-Cert Due Date  04/11/19  -MS         Time Calculation- PT    Total Timed Code Minutes- PT  17 minute(s)  -MS        User Key  (r) = Recorded By, (t) = Taken By, (c) = Cosigned By    Initials Name Provider Type    Amadou Kumar, PT Physical Therapist        Therapy Charges for Today     Code Description Service Date Service Provider Modifiers Qty    37690123739 HC PT EVAL LOW COMPLEXITY 1 4/8/2019 Amadou Avelar, PT GP 1    29098072557 HC PT THER PROC EA 15 MIN 4/8/2019 Amadou Avelar, PT GP 1    37518009049 HC PT THER SUPP EA 15 MIN 4/8/2019 Amadou Avelar, PT GP 1          PT G-Codes  Outcome Measure Options: AM-PAC 6 Clicks Basic Mobility (PT)  AM-PAC 6 Clicks Score: 14      Amadou Avelar, PT  4/8/2019

## 2019-04-08 NOTE — ANESTHESIA PREPROCEDURE EVALUATION
Anesthesia Evaluation     Patient summary reviewed and Nursing notes reviewed   NPO Solid Status: > 8 hours  NPO Liquid Status: > 4 hours           Airway   Mallampati: II  TM distance: >3 FB  Neck ROM: full  no difficulty expected  Dental - normal exam     Pulmonary - normal exam   Cardiovascular - normal exam    (+) hypertension, hyperlipidemia,       Neuro/Psych  (+) headaches, psychiatric history Anxiety and Depression,     GI/Hepatic/Renal/Endo    (+)   hypothyroidism,     Musculoskeletal     (+) back pain,   Abdominal  - normal exam   Substance History      OB/GYN          Other   (+) arthritis                     Anesthesia Plan    ASA 2     spinal     intravenous induction   Anesthetic plan, all risks, benefits, and alternatives have been provided, discussed and informed consent has been obtained with: patient.

## 2019-04-08 NOTE — DISCHARGE PLACEMENT REQUEST
"Kristie Kauffman (78 y.o. Female)     Date of Birth Social Security Number Address Home Phone MRN    1940  [id number] [facility] [address] [phone] [id number]    Presybeterian Marital Status          Confucianism        Admission Date Admission Type Admitting Provider Attending Provider Department, Room/Bed    4/8/19 Elective Trevon Hunt MD Brown, Reid B, MD [address], P877/1    Discharge Date Discharge Disposition Discharge Destination                       Attending Provider:  Trevon Hunt MD    Allergies:  Amoxicillin    Isolation:  None   Infection:  None   Code Status:  CPR    Ht:  154.9 cm (61\")   Wt:  63.7 kg (140 lb 6 oz)    Admission Cmt:  None   Principal Problem:  Primary osteoarthritis of right knee [M17.11] More...                 Active Insurance as of 4/8/2019     Primary Coverage     Payor Plan Insurance Group Employer/Plan Group    MEDICARE MEDICARE A & B      Payor Plan Address Payor Plan Phone Number Payor Plan Fax Number Effective Dates    [address] [phone]  12/1/2005 - None Entered    [facility] [id number]       Subscriber Name Subscriber Birth Date Member ID       KRISTIE KAUFFMAN 1940 [id number]           Secondary Coverage     Payor Plan Insurance Group Employer/Plan Group    [facility] SUPP [id number]     Payor Plan Address Payor Plan Phone Number Payor Plan Fax Number Effective Dates    [address]   12/1/2016 - None Entered    [facility] [id number]       Subscriber Name Subscriber Birth Date Member ID       KRISTIE KAUFFMAN 1940 [id number]                 Emergency Contacts      (Rel.) Home Phone Work Phone Mobile Phone    Mary Sesay (Daughter) [phone] -- [phone]    Ester Rosen (Daughter) [phone]        "

## 2019-04-08 NOTE — PLAN OF CARE
Problem: Patient Care Overview  Goal: Plan of Care Review   04/08/19 1724   Coping/Psychosocial   Plan of Care Reviewed With patient;family   OTHER   Outcome Summary Pt. presents with typical post op TKR impairments including decreased strength, balance, and AROM. Pt. will benefit from skilled inpt. P.T. to address her functional deficits and to assist pt. in regaining her maximum level of independence with functional mobility.

## 2019-04-08 NOTE — ANESTHESIA POSTPROCEDURE EVALUATION
Patient: Kristie Franz    Procedure Summary     Date:  04/08/19 Room / Location:  [facility] OR [address] [place] [address] MAIN OR    Anesthesia Start:  1006 Anesthesia Stop:  1202    Procedure:  TOTAL KNEE ARTHROPLASTY (Right Knee) Diagnosis:       Primary osteoarthritis of right knee      (Primary osteoarthritis of right knee [M17.11])    Surgeon:  Trevon Hunt MD Provider:  Artemio Jernigan MD    Anesthesia Type:  spinal ASA Status:  2          Anesthesia Type: spinal  Last vitals  BP   152/80 (04/08/19 1215)   Temp   36.4 °C (97.5 °F) (04/08/19 1159)   Pulse   57 (04/08/19 1215)   Resp   12 (04/08/19 1215)     SpO2   97 % (04/08/19 1215)     Post Anesthesia Care and Evaluation    Patient location during evaluation: PACU  Patient participation: complete - patient participated  Level of consciousness: awake and alert  Pain management: adequate  Airway patency: patent  Anesthetic complications: No anesthetic complications    Cardiovascular status: acceptable  Respiratory status: acceptable  Hydration status: acceptable    Comments: --------------------            04/08/19 1215     --------------------   BP:       152/80     Pulse:      57       Resp:       12       Temp:                SpO2:      97%      --------------------

## 2019-04-08 NOTE — PLAN OF CARE
Problem: Patient Care Overview  Goal: Plan of Care Review  Outcome: Ongoing (interventions implemented as appropriate)   04/08/19 1505 04/08/19 1605   Coping/Psychosocial   Plan of Care Reviewed With patient;family --    OTHER   Outcome Summary --  pt admitted to unit from PACU at this time. VSS. JAIME dressing CDI. Spinal in effect. pt incont x2. ambulated with therapy. assist x1 with walker. Educated on the importance of BP monitoring and the use of medications as ordered for HO HTN. Verbalized understanding. will cont to monitor.    Plan of Care Review   Progress --  no change     Goal: Individualization and Mutuality  Outcome: Ongoing (interventions implemented as appropriate)    Goal: Discharge Needs Assessment  Outcome: Ongoing (interventions implemented as appropriate)    Goal: Interprofessional Rounds/Family Conf  Outcome: Ongoing (interventions implemented as appropriate)      Problem: Fall Risk (Adult)  Goal: Identify Related Risk Factors and Signs and Symptoms  Outcome: Ongoing (interventions implemented as appropriate)    Goal: Absence of Fall  Outcome: Ongoing (interventions implemented as appropriate)      Problem: Knee Arthroplasty (Total, Partial) (Adult)  Goal: Signs and Symptoms of Listed Potential Problems Will be Absent, Minimized or Managed (Knee Arthroplasty)  Outcome: Ongoing (interventions implemented as appropriate)    Goal: Anesthesia/Sedation Recovery  Outcome: Ongoing (interventions implemented as appropriate)

## 2019-04-09 LAB
HCT VFR BLD AUTO: 34.4 % (ref 34–46.6)
HGB BLD-MCNC: 11 G/DL (ref 12–15.9)

## 2019-04-09 PROCEDURE — 25010000003 CEFAZOLIN IN DEXTROSE 2-4 GM/100ML-% SOLUTION: Performed by: ORTHOPAEDIC SURGERY

## 2019-04-09 PROCEDURE — 85014 HEMATOCRIT: CPT | Performed by: ORTHOPAEDIC SURGERY

## 2019-04-09 PROCEDURE — 25010000002 KETOROLAC TROMETHAMINE PER 15 MG: Performed by: ORTHOPAEDIC SURGERY

## 2019-04-09 PROCEDURE — 97110 THERAPEUTIC EXERCISES: CPT

## 2019-04-09 PROCEDURE — 97150 GROUP THERAPEUTIC PROCEDURES: CPT

## 2019-04-09 PROCEDURE — 85018 HEMOGLOBIN: CPT | Performed by: ORTHOPAEDIC SURGERY

## 2019-04-09 RX ADMIN — ASPIRIN 325 MG: 325 TABLET, DELAYED RELEASE ORAL at 20:05

## 2019-04-09 RX ADMIN — HYDROCODONE BITARTRATE AND ACETAMINOPHEN 2 TABLET: 7.5; 325 TABLET ORAL at 23:13

## 2019-04-09 RX ADMIN — MUPIROCIN 10 APPLICATION: 20 OINTMENT TOPICAL at 08:26

## 2019-04-09 RX ADMIN — CITALOPRAM 20 MG: 20 TABLET, FILM COATED ORAL at 06:19

## 2019-04-09 RX ADMIN — POLYETHYLENE GLYCOL 3350 17 G: 17 POWDER, FOR SOLUTION ORAL at 08:26

## 2019-04-09 RX ADMIN — MELOXICAM 15 MG: 15 TABLET ORAL at 08:26

## 2019-04-09 RX ADMIN — DOCUSATE SODIUM 100 MG: 100 CAPSULE, LIQUID FILLED ORAL at 20:05

## 2019-04-09 RX ADMIN — PANTOPRAZOLE SODIUM 40 MG: 40 TABLET, DELAYED RELEASE ORAL at 06:19

## 2019-04-09 RX ADMIN — POLYETHYLENE GLYCOL 3350 17 G: 17 POWDER, FOR SOLUTION ORAL at 20:05

## 2019-04-09 RX ADMIN — LATANOPROST 1 DROP: 50 SOLUTION OPHTHALMIC at 20:06

## 2019-04-09 RX ADMIN — ASPIRIN 325 MG: 325 TABLET, DELAYED RELEASE ORAL at 08:26

## 2019-04-09 RX ADMIN — HYDROCODONE BITARTRATE AND ACETAMINOPHEN 2 TABLET: 7.5; 325 TABLET ORAL at 19:01

## 2019-04-09 RX ADMIN — LEVOTHYROXINE SODIUM 50 MCG: 50 TABLET ORAL at 06:19

## 2019-04-09 RX ADMIN — KETOROLAC TROMETHAMINE 30 MG: 30 INJECTION INTRAMUSCULAR; INTRAVENOUS at 08:27

## 2019-04-09 RX ADMIN — CEFAZOLIN SODIUM 2 G: 2 INJECTION, SOLUTION INTRAVENOUS at 02:23

## 2019-04-09 RX ADMIN — DOCUSATE SODIUM 100 MG: 100 CAPSULE, LIQUID FILLED ORAL at 08:27

## 2019-04-09 RX ADMIN — KETOROLAC TROMETHAMINE 30 MG: 30 INJECTION INTRAMUSCULAR; INTRAVENOUS at 02:23

## 2019-04-09 NOTE — PLAN OF CARE
Problem: Patient Care Overview  Goal: Plan of Care Review  Outcome: Ongoing (interventions implemented as appropriate)   04/09/19 0244   Coping/Psychosocial   Plan of Care Reviewed With patient   OTHER   Outcome Summary HTN well controlled on PO meds. pain well controlled.   Plan of Care Review   Progress improving       Problem: Fall Risk (Adult)  Goal: Absence of Fall  Outcome: Ongoing (interventions implemented as appropriate)   04/09/19 0244   Fall Risk (Adult)   Absence of Fall making progress toward outcome

## 2019-04-09 NOTE — THERAPY TREATMENT NOTE
Acute Care - Physical Therapy Treatment Note  [facility]     Patient Name: Kristie Franz  : 1940  MRN: [id number]  Today's Date: 2019  Onset of Illness/Injury or Date of Surgery: 19  Date of Referral to PT: 19  Referring Physician: Trevon Aguiar    Admit Date: 2019    Visit Dx:    ICD-10-CM ICD-9-CM   1. Difficulty walking R26.2 719.7   2. Primary osteoarthritis of right knee M17.11 715.16     Patient Active Problem List   Diagnosis   • Hypertension   • Hyperlipidemia   • Mood disorder (CMS/HCC)   • Allergic rhinitis   • Osteopenia   • Insomnia   • Chronic pain of right knee   • Nocturia   • Chronic fatigue   • Chronic pain of left knee   • Other microscopic hematuria   • Acquired hypothyroidism   • Hypothyroidism   • Mild incontinence   • Primary osteoarthritis of right knee   • OA (osteoarthritis) of knee       Therapy Treatment    Rehabilitation Treatment Summary     Row Name 19 1604 19 1220          Treatment Time/Intention    Discipline  physical therapy assistant  -EH  physical therapy assistant  -     Document Type  therapy note (daily note)  -  therapy note (daily note)  -     Subjective Information  no complaints  -EH  no complaints  -EH     Mode of Treatment  physical therapy;group therapy  -EH  physical therapy;group therapy  -     Care Plan Review  patient/other agree to care plan  -  patient/other agree to care plan  -     Therapy Frequency (PT Clinical Impression)  2 times/day  -EH  2 times/day  -EH     Patient Effort  good  -EH  good  -EH     Existing Precautions/Restrictions  fall  -EH  fall  -EH     Recorded by [] Maria M Dallas PTA 19 1605 [] Maria M Dallas PTA 19 1223     Row Name 19 1604 19 1220          Cognitive Assessment/Intervention- PT/OT    Orientation Status (Cognition)  oriented x 3  -EH  oriented x 3  -EH     Follows Commands (Cognition)  WNL  -EH  WNL  -EH     Personal Safety Interventions  fall  prevention program maintained;gait belt;nonskid shoes/slippers when out of bed  -  fall prevention program maintained;gait belt;nonskid shoes/slippers when out of bed  -     Recorded by [] Maria M Dallas, \[facility]\"" 04/09/19 1605 [] Maria M Dallas, \[facility]\"" 04/09/19 1223     Row Name 04/09/19 1220             Bed Mobility Assessment/Treatment    Sit-Supine [place] (Bed Mobility)  contact guard  -      Assistive Device (Bed Mobility)  head of bed elevated;bed rails  -EH      Recorded by [] Maria M Dallas, \[facility]\"" 04/09/19 1223      Row Name 04/09/19 1604 04/09/19 1220          Sit-Stand Transfer    Sit-Stand [place] (Transfers)  contact guard  -  contact guard  -EH     Assistive Device (Sit-Stand Transfers)  walker, front-wheeled  -EH  walker, front-wheeled  -EH     Recorded by [] Maria M Dallas, \[facility]\"" 04/09/19 1605 [] Maria M Dallas, \[facility]\"" 04/09/19 1223     Row Name 04/09/19 1604 04/09/19 1220          Stand-Sit Transfer    Stand-Sit [place] (Transfers)  contact guard  -  contact guard  -EH     Assistive Device (Stand-Sit Transfers)  walker, front-wheeled  -  walker, front-wheeled  -EH     Recorded by [] Maria M Dallas, \[facility]\"" 04/09/19 1605 [] Maria M Dallas, \[facility]\"" 04/09/19 1223     Row Name 04/09/19 1604 04/09/19 1220          Gait/Stairs Assessment/Training    [place] Level (Gait)  contact guard  -  contact guard  -EH     Assistive Device (Gait)  walker, front-wheeled  -EH  walker, front-wheeled  -EH     Distance in Feet (Gait)  80  -EH  80  -EH     Pattern (Gait)  step-to  -EH  step-to  -EH     Deviations/Abnormal Patterns (Gait)  antalgic;stride length decreased  -  antalgic;stride length decreased  -EH     Recorded by [] Maria M Dallas, \[facility]\"" 04/09/19 1605 [] Maria M Dallas, \[facility]\"" 04/09/19 1223     Row Name 04/09/19 1220             Right Lower Ext    Rt Knee Extension/Flexion AROM  -6/87  -EH      Recorded by [EH] Maria M Dallas, PTA 04/09/19 1223      Row Name 04/09/19 1604 04/09/19 1222           Therapeutic Exercise    Comment (Therapeutic Exercise)  Right TKR protocol X20 reps  -EH  Right TKR protocol X15 reps  -EH     Recorded by [] Maria M Dallas PTA 04/09/19 1605 [] Maria M Dallas [facility] 04/09/19 1223     Row Name 04/09/19 1604 04/09/19 1220          Positioning and Restraints    Pre-Treatment Position  sitting in chair/recliner  -EH  sitting in chair/recliner  -EH     Post Treatment Position  bathroom  -EH  bed  -EH     In Bed  --  supine;call light within reach;encouraged to call for assist  -EH     Bathroom  sitting;call light within reach;encouraged to call for assist  -EH  --     Recorded by [] Maria M Dallas [facility] 04/09/19 1605 [] Maria M Dallas [facility] 04/09/19 1223     Row Name 04/09/19 1604             Skin WDL    Skin WDL  all  -EH      Skin Color/Characteristics  without discoloration  -EH      Skin Temperature  warm  -EH      Skin Moisture  dry  -EH      Skin Elasticity  quick return to original state  -EH      Skin Integrity  incision  -EH      Recorded by [] Maria M Dallas [facility] 04/09/19 1605      Row Name 04/09/19 1604             Wound 04/08/19 1058 Right knee incision    Wound - Properties Group Date first assessed: 04/08/19 [SD] Time first assessed: 1058 [SD] Side: Right [SD] Location: knee [SD] Type: incision [SD] Recorded by:  [SD] Celia Washburn, RN 04/08/19 1058    Dressing Appearance  dry;intact;no drainage  -EH      Closure  JO  -      Base  dressing in place, unable to visualize  -EH      Drainage Amount  none  -EH      Recorded by [] Maria M Dallas [facility] 04/09/19 1605      Row Name 04/09/19 1604             Coping    Observed Emotional State  calm;cooperative  -EH      Verbalized Emotional State  acceptance  -EH      Recorded by [] Maria M Dallas [facility] 04/09/19 1605        User Key  (r) = Recorded By, (t) = Taken By, (c) = Cosigned By    Initials Name Effective Dates Discipline    SD Celia Washburn, RN 06/16/16 -  Nurse     Maria M Dallas PTA 08/19/18 -  PT           Wound 04/08/19 1058 Right knee incision (Active)   Dressing Appearance dry;intact;no drainage 4/9/2019  4:04 PM   Closure JO 4/9/2019  4:04 PM   Base dressing in place, unable to visualize 4/9/2019  4:04 PM   Drainage Amount none 4/9/2019  4:04 PM           Physical Therapy Education     Title: PT OT SLP Therapies (Done)     Topic: Physical Therapy (Done)     Point: Mobility training (Done)     Learning Progress Summary           Patient Acceptance, E,D,TB, VU,DU by  at 4/9/2019 12:19 PM    Acceptance, E,D, VU,NR by MS at 4/8/2019  3:04 PM                   Point: Home exercise program (Done)     Learning Progress Summary           Patient Acceptance, E,D,TB, VU,DU by  at 4/9/2019 12:19 PM    Acceptance, E,D, VU,NR by MS at 4/8/2019  3:04 PM                   Point: Body mechanics (Done)     Learning Progress Summary           Patient Acceptance, E,D,TB, VU,DU by  at 4/9/2019 12:19 PM    Acceptance, E,D, VU,NR by MS at 4/8/2019  3:04 PM                   Point: Precautions (Done)     Learning Progress Summary           Patient Acceptance, E,D,TB, VU,DU by  at 4/9/2019 12:19 PM    Acceptance, E,D, VU,NR by MS at 4/8/2019  3:04 PM                               User Key     Initials Effective Dates Name Provider Type Discipline    MS 04/03/18 -  Amadou Avelar, PT Physical Therapist PT     08/19/18 -  Maria M Dallas PTA Physical Therapy Assistant PT                PT Recommendation and Plan  Therapy Frequency (PT Clinical Impression): 2 times/day  Plan of Care Reviewed With: patient  Progress: improving  Outcome Summary: Pt tolerated treatment with no complaints. Pt able to perform TKR protocol exercises with minimal c/o difficulty. Pt ambulated 80 feet with rwx, CGA.   Outcome Measures     Row Name 04/09/19 1200 04/08/19 1500          How much help from another person do you currently need...    Turning from your back to your side while in flat bed without using bedrails?  3  -  3  -MS     Moving  from lying on back to sitting on the side of a flat bed without bedrails?  3  -EH  3  -MS     Moving to and from a bed to a chair (including a wheelchair)?  3  -EH  2  -MS     Standing up from a chair using your arms (e.g., wheelchair, bedside chair)?  3  -EH  2  -MS     Climbing 3-5 steps with a railing?  2  -EH  2  -MS     To walk in hospital room?  3  -EH  2  -MS     AM-PAC 6 Clicks Score  17  -EH  14  -MS        Functional Assessment    Outcome Measure Options  --  AM-PAC 6 Clicks Basic Mobility (PT)  -MS       User Key  (r) = Recorded By, (t) = Taken By, (c) = Cosigned By    Initials Name Provider Type    MS Rosio Amadou RENATA, PT Physical Therapist     Maria M Dallas PTA Physical Therapy Assistant         Time Calculation:   PT Charges     Row Name 04/09/19 1604 04/09/19 1218          Time Calculation    Start Time  1300  -  0838  -     Stop Time  1330  -  0913  -     Time Calculation (min)  30 min  -EH  35 min  -EH     PT Received On  04/09/19  -  04/09/19  -     PT - Next Appointment  04/10/19  -  04/09/19  -        Time Calculation- PT    Total Timed Code Minutes- PT  30 minute(s)  -EH  35 minute(s)  -       User Key  (r) = Recorded By, (t) = Taken By, (c) = Cosigned By    Initials Name Provider Type     Maria M Dallas PTA Physical Therapy Assistant        Therapy Charges for Today     Code Description Service Date Service Provider Modifiers Qty    75507998157 HC PT THER PROC EA 15 MIN 4/9/2019 Maria M Dallas PTA GP 1    25653073486 HC PT THER PROC GROUP 4/9/2019 Maria M Dallas PTA GP 1    66619667644 HC PT THER PROC EA 15 MIN 4/9/2019 Maria M Dallas, LUANN GP 1    46094891573 HC PT THER PROC GROUP 4/9/2019 Maria M Dallas PTA GP 1          PT G-Codes  Outcome Measure Options: AM-PAC 6 Clicks Basic Mobility (PT)  AM-PAC 6 Clicks Score: 17    Maria M Dallas PTA  4/9/2019

## 2019-04-09 NOTE — THERAPY TREATMENT NOTE
Acute Care - Physical Therapy Treatment Note  [facility]     Patient Name: Kristie Franz  : 1940  MRN: [id number]  Today's Date: 2019  Onset of Illness/Injury or Date of Surgery: 19  Date of Referral to PT: 19  Referring Physician: Trevon Aguiar    Admit Date: 2019    Visit Dx:    ICD-10-CM ICD-9-CM   1. Difficulty walking R26.2 719.7   2. Primary osteoarthritis of right knee M17.11 715.16     Patient Active Problem List   Diagnosis   • Hypertension   • Hyperlipidemia   • Mood disorder (CMS/HCC)   • Allergic rhinitis   • Osteopenia   • Insomnia   • Chronic pain of right knee   • Nocturia   • Chronic fatigue   • Chronic pain of left knee   • Other microscopic hematuria   • Acquired hypothyroidism   • Hypothyroidism   • Mild incontinence   • Primary osteoarthritis of right knee   • OA (osteoarthritis) of knee       Therapy Treatment    Rehabilitation Treatment Summary     Row Name 19 1220             Treatment Time/Intention    Discipline  physical therapy assistant  -      Document Type  therapy note (daily note)  -      Subjective Information  no complaints  -EH      Mode of Treatment  physical therapy;group therapy  -      Care Plan Review  patient/other agree to care plan  -      Therapy Frequency (PT Clinical Impression)  2 times/day  -EH      Patient Effort  good  -EH      Existing Precautions/Restrictions  fall  -EH      Recorded by [] Maria M Dallas PTA 19 1223      Row Name 19 1220             Cognitive Assessment/Intervention- PT/OT    Orientation Status (Cognition)  oriented x 3  -EH      Follows Commands (Cognition)  WNL  -EH      Personal Safety Interventions  fall prevention program maintained;gait belt;nonskid shoes/slippers when out of bed  -EH      Recorded by [] Maria M Dallas PTA 19 1223      Row Name 19 1220             Bed Mobility Assessment/Treatment    Sit-Supine [place] (Bed Mobility)  contact guard  -       Assistive Device (Bed Mobility)  head of bed elevated;bed rails  -EH      Recorded by [] Maria M Dallas, [facility] 04/09/19 1223      Row Name 04/09/19 1220             Sit-Stand Transfer    Sit-Stand [place] (Transfers)  contact guard  -EH      Assistive Device (Sit-Stand Transfers)  walker, front-wheeled  -EH      Recorded by [] Maria M Dallas [facility] 04/09/19 1223      Row Name 04/09/19 1220             Stand-Sit Transfer    Stand-Sit [place] (Transfers)  contact guard  -EH      Assistive Device (Stand-Sit Transfers)  walker, front-wheeled  -EH      Recorded by [] Maria M Dallas, [facility] 04/09/19 1223      Row Name 04/09/19 1220             Gait/Stairs Assessment/Training    [place] Level (Gait)  contact guard  -EH      Assistive Device (Gait)  walker, front-wheeled  -EH      Distance in Feet (Gait)  80  -EH      Pattern (Gait)  step-to  -EH      Deviations/Abnormal Patterns (Gait)  antalgic;stride length decreased  -EH      Recorded by [] Maria M Dallas [facility] 04/09/19 1223      Row Name 04/09/19 1220             Right Lower Ext    Rt Knee Extension/Flexion AROM  -6/87  -EH      Recorded by [] Maria M Dallas [facility] 04/09/19 1223      Row Name 04/09/19 1220             Therapeutic Exercise    Comment (Therapeutic Exercise)  Right TKR protocol X15 reps  -EH      Recorded by [] Maria M Dallas [facility] 04/09/19 1223      Row Name 04/09/19 1220             Positioning and Restraints    Pre-Treatment Position  sitting in chair/recliner  -EH      Post Treatment Position  bed  -EH      In Bed  supine;call light within reach;encouraged to call for assist  -EH      Recorded by [] Maria M Dallas PTA 04/09/19 1223      Row Name                Wound 04/08/19 1058 Right knee incision    Wound - Properties Group Date first assessed: 04/08/19 [SD] Time first assessed: 1058 [SD] Side: Right [SD] Location: knee [SD] Type: incision [SD] Recorded by:  [SD] Celia Washburn RN 04/08/19 1058      User Key  (r) = Recorded By, (t) =  Taken By, (c) = Cosigned By    Initials Name Effective Dates Discipline    SD Celia Washburn RN 06/16/16 -  Nurse     Maria M Dallas PTA 08/19/18 -  PT          Wound 04/08/19 1058 Right knee incision (Active)   Dressing Appearance dry;intact;no drainage 4/9/2019  4:03 AM   Closure JO 4/8/2019  3:40 PM   Base dressing in place, unable to visualize 4/8/2019  3:40 PM   Drainage Amount none 4/8/2019  3:40 PM           Physical Therapy Education     Title: PT OT SLP Therapies (Done)     Topic: Physical Therapy (Done)     Point: Mobility training (Done)     Learning Progress Summary           Patient Acceptance, E,D,TB, VU,DU by  at 4/9/2019 12:19 PM    Acceptance, E,D, VU,NR by MS at 4/8/2019  3:04 PM                   Point: Home exercise program (Done)     Learning Progress Summary           Patient Acceptance, E,D,TB, VU,DU by  at 4/9/2019 12:19 PM    Acceptance, E,D, VU,NR by MS at 4/8/2019  3:04 PM                   Point: Body mechanics (Done)     Learning Progress Summary           Patient Acceptance, E,D,TB, VU,DU by  at 4/9/2019 12:19 PM    Acceptance, E,D, VU,NR by MS at 4/8/2019  3:04 PM                   Point: Precautions (Done)     Learning Progress Summary           Patient Acceptance, E,D,TB, VU,DU by  at 4/9/2019 12:19 PM    Acceptance, E,D, VU,NR by MS at 4/8/2019  3:04 PM                               User Key     Initials Effective Dates Name Provider Type Discipline    MS 04/03/18 -  Amadou Avelar, PT Physical Therapist PT     08/19/18 -  Maria M Dallas PTA Physical Therapy Assistant PT                PT Recommendation and Plan  Therapy Frequency (PT Clinical Impression): 2 times/day     Outcome Measures     Row Name 04/09/19 1200 04/08/19 1500          How much help from another person do you currently need...    Turning from your back to your side while in flat bed without using bedrails?  3  -  3  -MS     Moving from lying on back to sitting on the side of a flat bed without  bedrails?  3  -EH  3  -MS     Moving to and from a bed to a chair (including a wheelchair)?  3  -EH  2  -MS     Standing up from a chair using your arms (e.g., wheelchair, bedside chair)?  3  -EH  2  -MS     Climbing 3-5 steps with a railing?  2  -EH  2  -MS     To walk in hospital room?  3  -EH  2  -MS     AM-PAC 6 Clicks Score  17  -EH  14  -MS        Functional Assessment    Outcome Measure Options  --  AM-PAC 6 Clicks Basic Mobility (PT)  -MS       User Key  (r) = Recorded By, (t) = Taken By, (c) = Cosigned By    Initials Name Provider Type    MS RosioJeanneew RENATA, PT Physical Therapist     Maria M Dallas PTA Physical Therapy Assistant         Time Calculation:   PT Charges     Row Name 04/09/19 1218             Time Calculation    Start Time  0838  -      Stop Time  0913  -      Time Calculation (min)  35 min  -      PT Received On  04/09/19  -      PT - Next Appointment  04/09/19  -         Time Calculation- PT    Total Timed Code Minutes- PT  35 minute(s)  -        User Key  (r) = Recorded By, (t) = Taken By, (c) = Cosigned By    Initials Name Provider Type     Maria M Dallas PTA Physical Therapy Assistant        Therapy Charges for Today     Code Description Service Date Service Provider Modifiers Qty    57383782869 HC PT THER PROC EA 15 MIN 4/9/2019 Maria M Dallas PTA GP 1    15187586014 HC PT THER PROC GROUP 4/9/2019 Maria M Dallas PTA GP 1          PT G-Codes  Outcome Measure Options: AM-PAC 6 Clicks Basic Mobility (PT)  AM-PAC 6 Clicks Score: 17    Maria M Dallas PTA  4/9/2019        LDL/HDL 75/44. c/w atorvastatin 80 mg

## 2019-04-09 NOTE — PROGRESS NOTES
Discharge Planning Assessment  [facility]     Patient Name: Kristie Franz  MRN: [id number]  Today's Date: 4/9/2019    Admit Date: 4/8/2019    Discharge Needs Assessment     Row Name 04/09/19 1004       Living Environment    Lives With  alone    Current Living Arrangements  home/apartment/condo       Discharge Needs Assessment    Readmission Within the Last 30 Days  no previous admission in last 30 days    Concerns to be Addressed  discharge planning    Discharge Facility/Level of Care Needs  nursing facility, skilled        Discharge Plan     Row Name 04/09/19 1005       Plan    Plan  [facility]    Patient/Family in Agreement with Plan  yes    Plan Comments  Spoke with pt, verified correct information on facesheet and explained the role of CCP. Pt is pre registered to d/c to [facility], referral given to [facility] [place] who states they are able to accept and will have bed available Thursday. Plan will be to d/c to SNF.        Destination - Selection Complete      Service Provider Request Status Selected Services Address Phone Number Fax Number    [facility] [address] [phone]      Durable Medical Equipment      No service coordination in this encounter.      Dialysis/Infusion      No service coordination in this encounter.      Home Medical Care      No service coordination in this encounter.      Therapy      No service coordination in this encounter.      Community Resources      No service coordination in this encounter.          Demographic Summary    No documentation.       Functional Status    No documentation.       Psychosocial    No documentation.       Abuse/Neglect    No documentation.       Legal    No documentation.       Substance Abuse    No documentation.       Patient Forms    No documentation.           Maria Antonia Almonte RN

## 2019-04-09 NOTE — PLAN OF CARE
Problem: Patient Care Overview  Goal: Plan of Care Review  Outcome: Ongoing (interventions implemented as appropriate)   04/09/19 1223 04/09/19 0603   Coping/Psychosocial   Plan of Care Reviewed With patient --    OTHER   Outcome Summary --  Pt POD 1 RTK. VSS. JAIME dressing in place. CDO. HV D/C'd pain is controlled pt RF pain medication. ambulated with therapy. voiding per BRP. pt being D/C'd to rehab when able. educated on the importance of BP monitoring and the use of medications as ordered for HO HTN. verbalized understanding. will cont to monitor.    Plan of Care Review   Progress improving --      Goal: Individualization and Mutuality  Outcome: Ongoing (interventions implemented as appropriate)    Goal: Discharge Needs Assessment  Outcome: Ongoing (interventions implemented as appropriate)    Goal: Interprofessional Rounds/Family Conf  Outcome: Ongoing (interventions implemented as appropriate)      Problem: Fall Risk (Adult)  Goal: Identify Related Risk Factors and Signs and Symptoms  Outcome: Ongoing (interventions implemented as appropriate)    Goal: Absence of Fall  Outcome: Ongoing (interventions implemented as appropriate)      Problem: Knee Arthroplasty (Total, Partial) (Adult)  Goal: Signs and Symptoms of Listed Potential Problems Will be Absent, Minimized or Managed (Knee Arthroplasty)  Outcome: Ongoing (interventions implemented as appropriate)    Goal: Anesthesia/Sedation Recovery  Outcome: Ongoing (interventions implemented as appropriate)

## 2019-04-09 NOTE — PLAN OF CARE
Problem: Patient Care Overview  Goal: Plan of Care Review  Outcome: Ongoing (interventions implemented as appropriate)   04/09/19 1223   Coping/Psychosocial   Plan of Care Reviewed With patient   OTHER   Outcome Summary Pt tolerated treatment with no complaints. Pt able to perform TKR protocol exercises with minimal c/o difficulty. Pt ambulated 80 feet with rwx, CGA.    Plan of Care Review   Progress improving

## 2019-04-09 NOTE — PROGRESS NOTES
Orthopedic Total Knee Progress Note      Patient: Kristie Franz  Date of Admission: 4/8/2019  YOB: 1940  Medical Record Number: [id number]    POD # : 1 Day Post-Op Procedure(s) (LRB):  TOTAL KNEE ARTHROPLASTY (Right)    Systemic or Specific Complaints: No Complaints    Pain Relief: complete resolution    Physical Exam:  78 y.o.  female  Vitals:  Temp:  [96.5 °F (35.8 °C)-99.4 °F (37.4 °C)] 99.4 °F (37.4 °C)  Heart Rate:  [52-62] 62  Resp:  [12-18] 16  BP: (119-167)/(71-98) 119/71  alert and oriented  Chest: Clear to auscultation  CV: Regular Rate and Rhythm  Abd: Soft, NT, with BS +  Ext: NV intact. ROM appropriate. Calf is soft and nontender. Negative Homans Sn  Skin: Incision clean dry and intact w/out signs or  symptoms of infection.    Activity: Mobilizing Per P.T.   Weight Bearing: As Tolerated    Data Review     Admission on 04/08/2019   Component Date Value Ref Range Status   • Hemoglobin 04/09/2019 11.0* 12.0 - 15.9 g/dL Final   • Hematocrit 04/09/2019 34.4  34.0 - 46.6 % Final       No results found.    Medications    aspirin 325 mg Oral Q12H   citalopram 20 mg Oral QAM   docusate sodium 100 mg Oral BID   ketorolac 30 mg Intravenous Q8H   latanoprost 1 drop Both Eyes Nightly   levothyroxine 50 mcg Oral QAM   meloxicam 15 mg Oral Daily   mupirocin  Each Nare BID   pantoprazole 40 mg Oral Q AM   polyethylene glycol 17 g Oral BID   triamterene-hydrochlorothiazide 1 tablet Oral Every Other Day     bisacodyl  •  HYDROcodone-acetaminophen  •  HYDROcodone-acetaminophen  •  melatonin  •  ondansetron **OR** ondansetron ODT **OR** ondansetron    Assessment:  Doing well POD  # 1 Day Post-Op following total knee replacement  Problem List Items Addressed This Visit        Musculoskeletal and Integument    * (Principal) Primary osteoarthritis of right knee    Relevant Medications    lactated ringers bolus 500 mL (Completed)    clindamycin (CLEOCIN) 900 mg in dextrose 5% 50 mL IVPB (premix) (Completed)     meloxicam (MOBIC) tablet 15 mg (Completed)    pregabalin (LYRICA) capsule 150 mg (Completed)    vancomycin (VANCOCIN) in iso-osmotic dextrose IVPB 1 g (premix) 200 mL (Completed)      Other Visit Diagnoses     Difficulty walking    -  Primary          Plan:   Continue efforts to mobilize  Continue Pain Control Measures  Continue incisional Care  DVT prophylaxis    Discharge Plan:Rehab Thursday    Trevon Hunt MD    Date: 4/9/2019  Time: 7:33 AM

## 2019-04-10 LAB
HCT VFR BLD AUTO: 33 % (ref 34–46.6)
HGB BLD-MCNC: 10.7 G/DL (ref 12–15.9)

## 2019-04-10 PROCEDURE — 97150 GROUP THERAPEUTIC PROCEDURES: CPT

## 2019-04-10 PROCEDURE — 85014 HEMATOCRIT: CPT | Performed by: ORTHOPAEDIC SURGERY

## 2019-04-10 PROCEDURE — 85018 HEMOGLOBIN: CPT | Performed by: ORTHOPAEDIC SURGERY

## 2019-04-10 PROCEDURE — 97110 THERAPEUTIC EXERCISES: CPT

## 2019-04-10 RX ADMIN — HYDROCODONE BITARTRATE AND ACETAMINOPHEN 1 TABLET: 7.5; 325 TABLET ORAL at 03:33

## 2019-04-10 RX ADMIN — ASPIRIN 325 MG: 325 TABLET, DELAYED RELEASE ORAL at 21:59

## 2019-04-10 RX ADMIN — TRIAMTERENE AND HYDROCHLOROTHIAZIDE 1 TABLET: 37.5; 25 TABLET ORAL at 08:09

## 2019-04-10 RX ADMIN — LATANOPROST 1 DROP: 50 SOLUTION OPHTHALMIC at 21:59

## 2019-04-10 RX ADMIN — LEVOTHYROXINE SODIUM 50 MCG: 50 TABLET ORAL at 06:08

## 2019-04-10 RX ADMIN — ASPIRIN 325 MG: 325 TABLET, DELAYED RELEASE ORAL at 08:09

## 2019-04-10 RX ADMIN — DOCUSATE SODIUM 100 MG: 100 CAPSULE, LIQUID FILLED ORAL at 08:09

## 2019-04-10 RX ADMIN — CITALOPRAM 20 MG: 20 TABLET, FILM COATED ORAL at 06:08

## 2019-04-10 RX ADMIN — POLYETHYLENE GLYCOL 3350 17 G: 17 POWDER, FOR SOLUTION ORAL at 08:09

## 2019-04-10 RX ADMIN — HYDROCODONE BITARTRATE AND ACETAMINOPHEN 2 TABLET: 7.5; 325 TABLET ORAL at 19:17

## 2019-04-10 RX ADMIN — MELOXICAM 15 MG: 15 TABLET ORAL at 08:09

## 2019-04-10 RX ADMIN — HYDROCODONE BITARTRATE AND ACETAMINOPHEN 2 TABLET: 7.5; 325 TABLET ORAL at 07:34

## 2019-04-10 RX ADMIN — PANTOPRAZOLE SODIUM 40 MG: 40 TABLET, DELAYED RELEASE ORAL at 06:08

## 2019-04-10 RX ADMIN — HYDROCODONE BITARTRATE AND ACETAMINOPHEN 2 TABLET: 7.5; 325 TABLET ORAL at 13:35

## 2019-04-10 NOTE — PLAN OF CARE
Problem: Patient Care Overview  Goal: Plan of Care Review  Outcome: Ongoing (interventions implemented as appropriate)   04/10/19 0341 04/10/19 1055 04/10/19 9220   Coping/Psychosocial   Plan of Care Reviewed With --  patient --    OTHER   Outcome Summary --  --  Pt pod 2 right TKA. Dressing CDI, vss, n/v intact. Will continue to monitor bp r/t history of HTN. Possible d/c to rehab tomorrow.   Plan of Care Review   Progress improving --  --      Goal: Individualization and Mutuality  Outcome: Ongoing (interventions implemented as appropriate)    Goal: Discharge Needs Assessment  Outcome: Ongoing (interventions implemented as appropriate)    Goal: Interprofessional Rounds/Family Conf  Outcome: Ongoing (interventions implemented as appropriate)      Problem: Fall Risk (Adult)  Goal: Absence of Fall  Outcome: Ongoing (interventions implemented as appropriate)      Problem: Knee Arthroplasty (Total, Partial) (Adult)  Goal: Signs and Symptoms of Listed Potential Problems Will be Absent, Minimized or Managed (Knee Arthroplasty)  Outcome: Ongoing (interventions implemented as appropriate)

## 2019-04-10 NOTE — THERAPY TREATMENT NOTE
Acute Care - Physical Therapy Treatment Note  [facility]     Patient Name: Kristie Franz  : 1940  MRN: [id number]  Today's Date: 4/10/2019  Onset of Illness/Injury or Date of Surgery: 19  Date of Referral to PT: 19  Referring Physician: rTevon Aguiar    Admit Date: 2019    Visit Dx:    ICD-10-CM ICD-9-CM   1. Difficulty walking R26.2 719.7   2. Primary osteoarthritis of right knee M17.11 715.16     Patient Active Problem List   Diagnosis   • Hypertension   • Hyperlipidemia   • Mood disorder (CMS/HCC)   • Allergic rhinitis   • Osteopenia   • Insomnia   • Chronic pain of right knee   • Nocturia   • Chronic fatigue   • Chronic pain of left knee   • Other microscopic hematuria   • Acquired hypothyroidism   • Hypothyroidism   • Mild incontinence   • Primary osteoarthritis of right knee   • OA (osteoarthritis) of knee       Therapy Treatment    Rehabilitation Treatment Summary     Row Name 04/10/19 1050             Treatment Time/Intention    Discipline  physical therapist  -EM      Document Type  therapy note (daily note)  -EM      Subjective Information  complains of;pain  -EM      Mode of Treatment  physical therapy;group therapy  -EM      Patient/Family Observations  patient in recliner, awake and alert   -EM      Patient Effort  good  -EM      Existing Precautions/Restrictions  fall  -EM      Recorded by [EM] Skye Coffey, PT 04/10/19 1054      Row Name 04/10/19 1050             Sit-Stand Transfer    Sit-Stand [place] (Transfers)  contact guard  -EM      Assistive Device (Sit-Stand Transfers)  walker, front-wheeled  -EM      Recorded by [EM] Skye Coffey, PT 04/10/19 1054      Row Name 04/10/19 1050             Stand-Sit Transfer    Stand-Sit [place] (Transfers)  contact guard  -EM      Assistive Device (Stand-Sit Transfers)  walker, front-wheeled  -EM      Recorded by [EM] Skye Coffey, PT 04/10/19 1054      Row Name 04/10/19 1050              Gait/Stairs Assessment/Training    [place] Level (Gait)  contact guard  -EM      Assistive Device (Gait)  walker, front-wheeled  -EM      Distance in Feet (Gait)  150  -EM2      Pattern (Gait)  step-to  -EM      Deviations/Abnormal Patterns (Gait)  antalgic;stride length decreased  -EM      Recorded by [EM] Skye Coffey, PT 04/10/19 1054  [EM2] Skye Coffey, PT 04/10/19 1055      Row Name 04/10/19 1050             Therapeutic Exercise    Comment (Therapeutic Exercise)  R TKR protocol x 25 reps, AROM 10 to 65 R knee   -EM      Recorded by [EM] Skye Coffey, PT 04/10/19 1054      Row Name 04/10/19 1050             Positioning and Restraints    Pre-Treatment Position  sitting in chair/recliner  -EM      Post Treatment Position  bed  -EM      In Bed  supine;call light within reach with Jana   -EM      Recorded by [EM] Skye Coffey, PT 04/10/19 1054      Row Name 04/10/19 1050             Pain Scale: Numbers Pre/Post-Treatment    Pain Scale: Numbers, Pretreatment  8/10  -EM      Pain Location - Side  Right  -EM      Pain Location  knee  -EM      Pain Intervention(s)  Medication (See MAR)  -EM      Recorded by [EM] Skye Coffey, PT 04/10/19 1054      Row Name                Wound 04/08/19 1058 Right knee incision    Wound - Properties Group Date first assessed: 04/08/19 [SD] Time first assessed: 1058 [SD] Side: Right [SD] Location: knee [SD] Type: incision [SD] Recorded by:  [SD] Celia Washburn RN 04/08/19 1058    Row Name 04/10/19 1050             Outcome Summary/Treatment Plan (PT)    Anticipated Discharge Disposition (PT)  skilled nursing facility  -EM      Recorded by [EM] Skye Coffey, PT 04/10/19 1054        User Key  (r) = Recorded By, (t) = Taken By, (c) = Cosigned By    Initials Name Effective Dates Discipline    SD Celia Washburn RN 06/16/16 -  Nurse    EM Skye Coffey, PT 04/03/18 -  PT          Wound 04/08/19 1058 Right knee incision  (Active)   Dressing Appearance dry;intact;no drainage 4/10/2019  8:32 AM   Closure JO 4/10/2019  8:32 AM   Base dressing in place, unable to visualize 4/10/2019  8:32 AM   Drainage Amount none 4/10/2019  8:32 AM           Physical Therapy Education     Title: PT OT SLP Therapies (In Progress)     Topic: Physical Therapy (In Progress)     Point: Mobility training (In Progress)     Learning Progress Summary           Patient Acceptance, E, NR by EM at 4/10/2019 10:54 AM    Acceptance, E,D,TB, VU,DU by  at 4/9/2019 12:19 PM    Acceptance, E,D, VU,NR by MS at 4/8/2019  3:04 PM                   Point: Home exercise program (In Progress)     Learning Progress Summary           Patient Acceptance, E, NR by EM at 4/10/2019 10:54 AM    Acceptance, E,D,TB, VU,DU by  at 4/9/2019 12:19 PM    Acceptance, E,D, VU,NR by MS at 4/8/2019  3:04 PM                   Point: Body mechanics (Done)     Learning Progress Summary           Patient Acceptance, E,D,TB, VU,DU by  at 4/9/2019 12:19 PM    Acceptance, E,D, VU,NR by MS at 4/8/2019  3:04 PM                   Point: Precautions (Done)     Learning Progress Summary           Patient Acceptance, E,D,TB, VU,DU by  at 4/9/2019 12:19 PM    Acceptance, E,D, VU,NR by MS at 4/8/2019  3:04 PM                               User Key     Initials Effective Dates Name Provider Type Discipline    EM 04/03/18 -  Skye Coffey, PT Physical Therapist PT    MS 04/03/18 -  Amadou Avelar, PT Physical Therapist PT     08/19/18 -  Maria M Dallas, LUANN Physical Therapy Assistant PT                PT Recommendation and Plan  Anticipated Discharge Disposition (PT): skilled nursing facility  Outcome Summary/Treatment Plan (PT)  Anticipated Discharge Disposition (PT): skilled nursing facility  Plan of Care Reviewed With: patient  Outcome Summary: patient completed 25 reps per TKR protocol, frequent rest breaks, c/o increased pain in R knee this morning. Ambulated 150 feet with rwx and CGA,  antalgic gait with decreased step length.   Outcome Measures     Row Name 04/10/19 1000 04/09/19 1200 04/08/19 1500       How much help from another person do you currently need...    Turning from your back to your side while in flat bed without using bedrails?  4  -EM  3  -EH  3  -MS    Moving from lying on back to sitting on the side of a flat bed without bedrails?  4  -EM  3  -EH  3  -MS    Moving to and from a bed to a chair (including a wheelchair)?  3  -EM  3  -EH  2  -MS    Standing up from a chair using your arms (e.g., wheelchair, bedside chair)?  3  -EM  3  -EH  2  -MS    Climbing 3-5 steps with a railing?  3  -EM  2  -EH  2  -MS    To walk in hospital room?  3  -EM  3  -EH  2  -MS    AM-PAC 6 Clicks Score  20  -EM  17  -EH  14  -MS       Functional Assessment    Outcome Measure Options  --  --  AM-PAC 6 Clicks Basic Mobility (PT)  -MS      User Key  (r) = Recorded By, (t) = Taken By, (c) = Cosigned By    Initials Name Provider Type    Skye Parikh, PT Physical Therapist    Amadou Kumar, PT Physical Therapist     Maria M Dallas, PTA Physical Therapy Assistant         Time Calculation:   PT Charges     Row Name 04/10/19 1056             Time Calculation    Start Time  0830  -EM      Stop Time  0916  -EM      Time Calculation (min)  46 min  -EM      PT Received On  04/10/19  -EM      PT - Next Appointment  04/10/19  -EM         Time Calculation- PT    Total Timed Code Minutes- PT  46 minute(s)  -EM        User Key  (r) = Recorded By, (t) = Taken By, (c) = Cosigned By    Initials Name Provider Type    Skye Parikh, PT Physical Therapist        Therapy Charges for Today     Code Description Service Date Service Provider Modifiers Qty    49582071870 HC PT THER PROC GROUP 4/10/2019 Skye Coffey, PT GP 1    66811011954 HC PT THER PROC EA 15 MIN 4/10/2019 Skye Coffey PT GP 1          PT G-Codes  Outcome Measure Options: AM-PAC 6 Clicks Basic Mobility (PT)  AM-PAC 6  Clicks Score: 20    Skye Coffey, PT  4/10/2019

## 2019-04-10 NOTE — PROGRESS NOTES
Orthopedic Progress Note      Patient: Kristie Franz    YOB: 1940    Medical Record Number: [id number]    Attending Physician: Trevon Hunt MD    Date of Admission: 4/8/2019  6:49 AM    Admitting Dx:  Primary osteoarthritis of right knee [M17.11]  OA (osteoarthritis) of knee [M17.10]    Status Post: TOTAL KNEE ARTHROPLASTY    Post Operative Day Number: 2    Current Problem List:   Patient Active Problem List   Diagnosis   • Hypertension   • Hyperlipidemia   • Mood disorder (CMS/HCC)   • Allergic rhinitis   • Osteopenia   • Insomnia   • Chronic pain of right knee   • Nocturia   • Chronic fatigue   • Chronic pain of left knee   • Other microscopic hematuria   • Acquired hypothyroidism   • Hypothyroidism   • Mild incontinence   • Primary osteoarthritis of right knee   • OA (osteoarthritis) of knee         Past Medical History:   Diagnosis Date   • Allergic Amoxicillin    reaction many years ago   • Allergic rhinitis    • Anemia    • Anxiety     low dosage med   • Arthritis    • Back pain    • Depression Since 's death   • Fatigue    • Hyperlipidemia    • Hypertension    • Hypothyroidism    • Insomnia    • Joint pain    • Migraine    • Mood disorder (CMS/HCC)    • Osteopenia    • Urinary frequency        SUBJECTIVE: 78 y.o.  female. Awake and alert.  Complaints of increased pain today.      OBJECTIVE:   Vitals:    04/09/19 1843 04/09/19 2311 04/10/19 0300 04/10/19 0741   BP: 135/81 145/87 139/80 137/84   BP Location: Left arm Left arm Right arm Left arm   Patient Position: Lying Lying Lying Lying   Pulse: 71 57 61 63   Resp: 16 16 16 16   Temp: 98.1 °F (36.7 °C) 97.4 °F (36.3 °C) 98 °F (36.7 °C) 98.8 °F (37.1 °C)   TempSrc: Oral Oral Oral Oral   SpO2: 96% 96% 97% 96%   Weight:       Height:         I/O last 3 completed shifts:  In: 1480 [P.O.:1480]  Out: 925 [Urine:700; Drains:225]    Current Medications:  Scheduled Meds:  aspirin 325 mg Oral Q12H   citalopram 20 mg Oral QAM   docusate  sodium 100 mg Oral BID   latanoprost 1 drop Both Eyes Nightly   levothyroxine 50 mcg Oral QAM   meloxicam 15 mg Oral Daily   pantoprazole 40 mg Oral Q AM   polyethylene glycol 17 g Oral BID   triamterene-hydrochlorothiazide 1 tablet Oral Every Other Day     PRN Meds:.bisacodyl  •  HYDROcodone-acetaminophen  •  HYDROcodone-acetaminophen  •  melatonin  •  ondansetron **OR** ondansetron ODT **OR** ondansetron    Diagnostic Tests:   Lab Results (last 24 hours)     Procedure Component Value Units Date/Time    Hemoglobin & Hematocrit, Blood [[id number]]  (Abnormal) Collected:  04/10/19 0351    Specimen:  Blood Updated:  04/10/19 0406     Hemoglobin 10.7 g/dL      Hematocrit 33.0 %           PHYSICAL EXAM:  Right JAIME knee dressing dry and intact.  Calf soft and nontender.  Good motion and sensation to right foot and ankle.  Having more pain today, but getting some relief with medication.  Slow progress with PT due to pain. Some nausea this morning, but had taken all of her morning medication including pain medication on empty stomach.  ABD soft with BS.  Had small BM this morning.  Better at present.       ASSESSMENT & PLAN:  Encouraged patient to avoid taking pain medication on empty stomach.  Plans to go to [facility] for rehab post discharge.         Date: 4/10/2019    Jana Aquino RN

## 2019-04-10 NOTE — PLAN OF CARE
Problem: Patient Care Overview  Goal: Plan of Care Review  Outcome: Ongoing (interventions implemented as appropriate)   04/10/19 7590   Coping/Psychosocial   Plan of Care Reviewed With patient   OTHER   Outcome Summary patient completed 25 reps per TKR protocol, frequent rest breaks, c/o increased pain in R knee this morning. Ambulated 150 feet with rwx and CGA, antalgic gait with decreased step length.

## 2019-04-11 VITALS
DIASTOLIC BLOOD PRESSURE: 68 MMHG | RESPIRATION RATE: 16 BRPM | HEART RATE: 65 BPM | SYSTOLIC BLOOD PRESSURE: 120 MMHG | WEIGHT: 140.43 LBS | TEMPERATURE: 97.2 F | BODY MASS INDEX: 26.51 KG/M2 | HEIGHT: 61 IN | OXYGEN SATURATION: 99 %

## 2019-04-11 LAB
HCT VFR BLD AUTO: 32.1 % (ref 34–46.6)
HGB BLD-MCNC: 10.1 G/DL (ref 12–15.9)

## 2019-04-11 PROCEDURE — 97150 GROUP THERAPEUTIC PROCEDURES: CPT

## 2019-04-11 PROCEDURE — 99024 POSTOP FOLLOW-UP VISIT: CPT | Performed by: NURSE PRACTITIONER

## 2019-04-11 PROCEDURE — 97110 THERAPEUTIC EXERCISES: CPT

## 2019-04-11 PROCEDURE — 85014 HEMATOCRIT: CPT | Performed by: ORTHOPAEDIC SURGERY

## 2019-04-11 PROCEDURE — 85018 HEMOGLOBIN: CPT | Performed by: ORTHOPAEDIC SURGERY

## 2019-04-11 RX ORDER — ONDANSETRON 4 MG/1
4 TABLET, FILM COATED ORAL EVERY 6 HOURS PRN
Qty: 10 TABLET | Refills: 0 | Status: SHIPPED | OUTPATIENT
Start: 2019-04-11 | End: 2019-05-20

## 2019-04-11 RX ORDER — HYDROCODONE BITARTRATE AND ACETAMINOPHEN 7.5; 325 MG/1; MG/1
2 TABLET ORAL EVERY 4 HOURS PRN
Qty: 60 TABLET | Refills: 0 | Status: SHIPPED | OUTPATIENT
Start: 2019-04-11 | End: 2019-04-18

## 2019-04-11 RX ORDER — PANTOPRAZOLE SODIUM 40 MG/1
40 TABLET, DELAYED RELEASE ORAL DAILY
Qty: 14 TABLET | Refills: 0 | OUTPATIENT
Start: 2019-04-11 | End: 2019-05-20

## 2019-04-11 RX ORDER — PSEUDOEPHEDRINE HCL 30 MG
100 TABLET ORAL 2 TIMES DAILY
Qty: 24 EACH | Refills: 0 | Status: SHIPPED | OUTPATIENT
Start: 2019-04-11 | End: 2019-04-23

## 2019-04-11 RX ADMIN — ASPIRIN 325 MG: 325 TABLET, DELAYED RELEASE ORAL at 07:54

## 2019-04-11 RX ADMIN — LEVOTHYROXINE SODIUM 50 MCG: 50 TABLET ORAL at 06:49

## 2019-04-11 RX ADMIN — HYDROCODONE BITARTRATE AND ACETAMINOPHEN 2 TABLET: 7.5; 325 TABLET ORAL at 07:54

## 2019-04-11 RX ADMIN — HYDROCODONE BITARTRATE AND ACETAMINOPHEN 1 TABLET: 7.5; 325 TABLET ORAL at 00:57

## 2019-04-11 RX ADMIN — MELOXICAM 15 MG: 15 TABLET ORAL at 07:54

## 2019-04-11 RX ADMIN — CITALOPRAM 20 MG: 20 TABLET, FILM COATED ORAL at 06:49

## 2019-04-11 RX ADMIN — PANTOPRAZOLE SODIUM 40 MG: 40 TABLET, DELAYED RELEASE ORAL at 06:49

## 2019-04-11 RX ADMIN — HYDROCODONE BITARTRATE AND ACETAMINOPHEN 2 TABLET: 7.5; 325 TABLET ORAL at 13:49

## 2019-04-11 NOTE — THERAPY DISCHARGE NOTE
Acute Care - Physical Therapy Treatment Note/Discharge  [facility]     Patient Name: Kristie Franz  : 1940  MRN: [id number]  Today's Date: 2019  Onset of Illness/Injury or Date of Surgery: 19  Date of Referral to PT: 19  Referring Physician: Trevon Aguiar    Admit Date: 2019    Visit Dx:    ICD-10-CM ICD-9-CM   1. Difficulty walking R26.2 719.7   2. Primary osteoarthritis of right knee M17.11 715.16     Patient Active Problem List   Diagnosis   • Hypertension   • Hyperlipidemia   • Mood disorder (CMS/HCC)   • Allergic rhinitis   • Osteopenia   • Insomnia   • Chronic pain of right knee   • Nocturia   • Chronic fatigue   • Chronic pain of left knee   • Other microscopic hematuria   • Acquired hypothyroidism   • Hypothyroidism   • Mild incontinence   • Primary osteoarthritis of right knee   • OA (osteoarthritis) of knee       Physical Therapy Education     Title: PT OT SLP Therapies (Done)     Topic: Physical Therapy (Done)     Point: Mobility training (Done)     Learning Progress Summary           Patient Acceptance, E,D, VU,NR by MS at 2019 11:06 AM    Acceptance, E, NR by  at 4/10/2019 10:54 AM    Acceptance, E,D,TB, VU,DU by  at 2019 12:19 PM    Acceptance, E,D, VU,NR by MS at 2019  3:04 PM                   Point: Home exercise program (Done)     Learning Progress Summary           Patient Acceptance, E,D, VU,NR by MS at 2019 11:06 AM    Acceptance, E, NR by EM at 4/10/2019 10:54 AM    Acceptance, E,D,TB, VU,DU by  at 2019 12:19 PM    Acceptance, E,D, VU,NR by MS at 2019  3:04 PM                   Point: Body mechanics (Done)     Learning Progress Summary           Patient Acceptance, E,D, VU,NR by MS at 2019 11:06 AM    Acceptance, E,D,TB, VU,DU by  at 2019 12:19 PM    Acceptance, E,D, VU,NR by MS at 2019  3:04 PM                   Point: Precautions (Done)     Learning Progress Summary           Patient Acceptance, E,D, VU,NR by MS  at 4/11/2019 11:06 AM    Acceptance, E,D,TB, VU,DU by  at 4/9/2019 12:19 PM    Acceptance, E,D, VU,NR by MS at 4/8/2019  3:04 PM                               User Key     Initials Effective Dates Name Provider Type Discipline     04/03/18 -  Skye Coffey, PT Physical Therapist PT    MS 04/03/18 -  Amadou Avelar, PT Physical Therapist PT     08/19/18 -  Maria M Dallas PTA Physical Therapy Assistant PT                Therapy Treatment  Rehabilitation Treatment Summary     Row Name 04/11/19 1105             Treatment Time/Intention    Discipline  physical therapist  -MS      Document Type  therapy note (daily note);discharge treatment  -MS      Subjective Information  complains of;pain;fatigue  -MS      Mode of Treatment  physical therapy  -MS      Patient Effort  good  -MS      Recorded by [MS] Amadou Avelar, PT 04/11/19 1106      Row Name 04/11/19 1105             Cognitive Assessment/Intervention- PT/OT    Orientation Status (Cognition)  oriented x 3  -MS      Follows Commands (Cognition)  WNL  -MS      Personal Safety Interventions  fall prevention program maintained;gait belt;nonskid shoes/slippers when out of bed;supervised activity  -MS      Recorded by [MS] Amadou Avelar, PT 04/11/19 1106      Row Name 04/11/19 1105             Mobility Assessment/Intervention    Right Lower Extremity (Weight-bearing Status)  weight-bearing as tolerated (WBAT)  -MS      Recorded by [MS] Amadou Avelar, PT 04/11/19 1106      Row Name 04/11/19 1105             Bed Mobility Assessment/Treatment    Comment (Bed Mobility)  Up in chair this AM.  -MS      Recorded by [MS] Amadou Avelar, PT 04/11/19 1106      Row Name 04/11/19 1105             Sit-Stand Transfer    Sit-Stand [place] (Transfers)  contact guard  -MS      Assistive Device (Sit-Stand Transfers)  walker, front-wheeled  -MS      Recorded by [MS] Amadou Avelar, PT 04/11/19 1106      Row Name 04/11/19 1105             Stand-Sit  Transfer    Stand-Sit [place] (Transfers)  contact guard  -MS      Assistive Device (Stand-Sit Transfers)  walker, front-wheeled  -MS      Recorded by [MS] Amadou Avelar, PT 04/11/19 1106      Row Name 04/11/19 1105             Gait/Stairs Assessment/Training    [place] Level (Gait)  contact guard  -MS      Assistive Device (Gait)  walker, front-wheeled  -MS      Distance in Feet (Gait)  100 feet  -MS      Pattern (Gait)  step-through  -MS      Deviations/Abnormal Patterns (Gait)  antalgic;stride length decreased  -MS      Comment (Gait/Stairs)  Limited by overall pain, fatigue this AM.  -MS      Recorded by [MS] Amadou Avelar, PT 04/11/19 1106      Row Name 04/11/19 1105             Right Lower Ext    Rt Knee Extension/Flexion AROM  (9, 80)  -MS      Recorded by [MS] Amadou Avelar, PT 04/11/19 1106      Row Name 04/11/19 1105             Therapeutic Exercise    Comment (Therapeutic Exercise)  Right TKR protocol x 30 reps completed  -MS      Recorded by [MS] Amadou Avelar, PT 04/11/19 1106      Row Name 04/11/19 1105             Positioning and Restraints    Pre-Treatment Position  sitting in chair/recliner  -MS      Post Treatment Position  chair  -MS      In Chair  notified nsg;reclined;sitting;call light within reach;encouraged to call for assist Ice pack to Right knee.   -MS      Recorded by [MS] Amadou Avelar, PT 04/11/19 1106      Row Name 04/11/19 1105             Pain Scale: Numbers Pre/Post-Treatment    Pain Scale: Numbers, Pretreatment  6/10  -MS      Pain Scale: Numbers, Post-Treatment  6/10  -MS      Pain Location - Side  Right  -MS      Pain Location  knee  -MS      Pain Intervention(s)  Medication (See MAR);Cold applied;Repositioned;Elevated;Rest  -MS      Recorded by [MS] Amadou Avelar, PT 04/11/19 1106      Row Name                Wound 04/08/19 1058 Right knee incision    Wound - Properties Group Date first assessed: 04/08/19 [SD] Time first assessed: 1058 [SD]  Side: Right [SD] Location: knee [SD] Type: incision [SD] Recorded by:  [SD] Celia Washburn RN 04/08/19 1058      User Key  (r) = Recorded By, (t) = Taken By, (c) = Cosigned By    Initials Name Effective Dates Discipline    SD Celia Washburn, RN 06/16/16 -  Nurse    MS Amadou Avelar RENATA, PT 04/03/18 -  PT        Wound 04/08/19 1058 Right knee incision (Active)   Dressing Appearance dry;intact;no drainage 4/11/2019  7:54 AM   Closure JO 4/11/2019  7:54 AM   Base dressing in place, unable to visualize 4/11/2019  7:54 AM   Drainage Amount none 4/11/2019  7:54 AM       PT Recommendation and Plan  Anticipated Discharge Disposition (PT): skilled nursing facility  Planned Therapy Interventions (PT Eval): balance training, bed mobility training, gait training, home exercise program, patient/family education, postural re-education, ROM (range of motion), strengthening, transfer training  Therapy Frequency (PT Clinical Impression): 2 times/day  Outcome Summary/Treatment Plan (PT)  Anticipated Discharge Disposition (PT): skilled nursing facility  Plan of Care Reviewed With: patient  Outcome Summary: Pt. able to ambulate 100 feet, CGA x 1 with use of Rwx this AM.  Pt. completed Right TKR ther. ex. protocol x 30 reps.  Pt. to discharge to SNF this day.     Outcome Measures     Row Name 04/11/19 1100 04/10/19 1000 04/09/19 1200       How much help from another person do you currently need...    Turning from your back to your side while in flat bed without using bedrails?  3  -MS  4  -EM  3  -EH    Moving from lying on back to sitting on the side of a flat bed without bedrails?  3  -MS  4  -EM  3  -EH    Moving to and from a bed to a chair (including a wheelchair)?  3  -MS  3  -EM  3  -EH    Standing up from a chair using your arms (e.g., wheelchair, bedside chair)?  3  -MS  3  -EM  3  -EH    Climbing 3-5 steps with a railing?  2  -MS  3  -EM  2  -EH    To walk in hospital room?  3  -MS  3  -EM  3  -EH    AM-PAC 6 Clicks  Score  17  -MS  20  -EM  17  -EH       Functional Assessment    Outcome Measure Options  AM-PAC 6 Clicks Basic Mobility (PT)  -MS  --  --    Row Name 04/08/19 1500             How much help from another person do you currently need...    Turning from your back to your side while in flat bed without using bedrails?  3  -MS      Moving from lying on back to sitting on the side of a flat bed without bedrails?  3  -MS      Moving to and from a bed to a chair (including a wheelchair)?  2  -MS      Standing up from a chair using your arms (e.g., wheelchair, bedside chair)?  2  -MS      Climbing 3-5 steps with a railing?  2  -MS      To walk in hospital room?  2  -MS      AM-PAC 6 Clicks Score  14  -MS         Functional Assessment    Outcome Measure Options  AM-PAC 6 Clicks Basic Mobility (PT)  -MS        User Key  (r) = Recorded By, (t) = Taken By, (c) = Cosigned By    Initials Name Provider Type     Skye Coffey, PT Physical Therapist    Amadou Kumar, PT Physical Therapist     Maria M Dallas, PTA Physical Therapy Assistant           Time Calculation:   PT Charges     Row Name 04/11/19 1108             Time Calculation    Start Time  0835  -MS      Stop Time  0909  -MS      Time Calculation (min)  34 min  -MS      PT Received On  04/11/19  -MS         Time Calculation- PT    Total Timed Code Minutes- PT  20 minute(s)  -MS        User Key  (r) = Recorded By, (t) = Taken By, (c) = Cosigned By    Initials Name Provider Type    Amadou Kumar, PT Physical Therapist        Therapy Charges for Today     Code Description Service Date Service Provider Modifiers Qty    54662667116  PT THER PROC EA 15 MIN 4/11/2019 Amadou Avelar, PT GP 1    80563425988 HC PT THER PROC GROUP 4/11/2019 Amadou Avelar, PT GP 1          PT G-Codes  Outcome Measure Options: AM-PAC 6 Clicks Basic Mobility (PT)  AM-PAC 6 Clicks Score: 17    PT Discharge Summary  Anticipated Discharge Disposition (PT): skilled nursing  facility  Reason for Discharge: Discharge from facility  Outcomes Achieved: Refer to plan of care for updates on goals achieved  Discharge Destination: SNF    Amadou Avelar, PT  4/11/2019

## 2019-04-11 NOTE — DISCHARGE SUMMARY
Patient Name: Kristie Franz  Patient YOB: 1940    Date of Admission:  4/8/2019  Date of Discharge:  4/11/2019  Discharge Diagnosis:TOTAL KNEE ARTHROPLASTY    Presenting Problem/History of Present Illness: Primary osteoarthritis of right knee [M17.11]  OA (osteoarthritis) of knee [M17.10]  Admitting Physician: Dr. Trevon Hunt    Consults:   Consults     No orders found from 3/10/2019 to 4/9/2019.          DETAILS OF HOSPITAL STAY:  Patient is a 78 y.o. female was admitted to the floor following the above procedure and underwent an uncomplicated hospital stay.  Patient did well with physical therapy and was ambulating well without problems.  On the day of discharge the wound was clean, dry and intact and calf was soft and non tender and Homans sign was negative.  Patient was tolerating  without problems.  Patient will be discharged to rehab    Condition on Discharge:  Stable    Vital Signs  Temp:  [96.6 °F (35.9 °C)-98.4 °F (36.9 °C)] 96.6 °F (35.9 °C)  Heart Rate:  [62-67] 63  Resp:  [16] 16  BP: (109-140)/(58-80) 138/80    LABS / Imaging Results:   Admission on 04/08/2019   Component Date Value Ref Range Status   • Hemoglobin 04/09/2019 11.0* 12.0 - 15.9 g/dL Final   • Hematocrit 04/09/2019 34.4  34.0 - 46.6 % Final   • Hemoglobin 04/10/2019 10.7* 12.0 - 15.9 g/dL Final   • Hematocrit 04/10/2019 33.0* 34.0 - 46.6 % Final   • Hemoglobin 04/11/2019 10.1* 12.0 - 15.9 g/dL Final   • Hematocrit 04/11/2019 32.1* 34.0 - 46.6 % Final       No results found.      Discharge Medications     Discharge Medications      New Medications      Instructions Start Date   aspirin 325 MG EC tablet   325 mg, Oral, Every 12 Hours Scheduled      docusate sodium 100 MG capsule   100 mg, Oral, 2 Times Daily      HYDROcodone-acetaminophen 7.5-325 MG per tablet  Commonly known as:  NORCO   2 tablets, Oral, Every 4 Hours PRN      ondansetron 4 MG tablet  Commonly known as:  ZOFRAN   4 mg, Oral, Every 6 Hours PRN      pantoprazole  40 MG EC tablet  Commonly known as:  PROTONIX   40 mg, Oral, Daily      polyethylene glycol pack packet  Commonly known as:  MIRALAX   17 g, Oral, 2 Times Daily         Continue These Medications      Instructions Start Date   citalopram 20 MG tablet  Commonly known as:  CeleXA   20 mg, Oral, Every Morning      fexofenadine 180 MG tablet  Commonly known as:  ALLEGRA   180 mg, Oral, As Needed      guaiFENesin 600 MG 12 hr tablet  Commonly known as:  MUCINEX   600 mg, Oral, As Needed      levothyroxine 50 MCG tablet  Commonly known as:  SYNTHROID, LEVOTHROID   50 mcg, Oral, Every Morning      rizatriptan MLT 10 MG disintegrating tablet  Commonly known as:  MAXALT-MLT   10 mg, Oral, Once As Needed, May repeat in 2 hours if needed      TRAVATAN Z 0.004 % solution ophthalmic solution  Generic drug:  travoprost (BAK free)   1 drop, Both Eyes, Nightly      triamterene-hydrochlorothiazide 37.5-25 MG per tablet  Commonly known as:  MAXZIDE-25   1 tablet, Oral, Every Other Day         Stop These Medications    acetaminophen 650 MG 8 hr tablet  Commonly known as:  TYLENOL     BIOTIN 5000 PO     Chlorhexidine Gluconate Cloth 2 % pads     fish oil 1000 MG capsule capsule     GLUCOSAMINE-CHONDROITIN PO     ibuprofen 200 MG tablet  Commonly known as:  ADVIL,MOTRIN     MULTI VITAMIN DAILY PO     mupirocin 2 % nasal ointment  Commonly known as:  BACTROBAN     Vitamin D (Ergocalciferol) 2000 units capsule            Activity at Discharge:     Discharge Instructions: Patient is to continue with physical therapy exercises daily and continue working with the physical therapist daily. Apply ice regularly. Patient may ice for long periods of time as long as ice is not directly on the skin. Patient instructed on frequent calf pumping exercises.  Patient also instructed on incentive spirometer during hospitalization and encouraged to continue to use at home regularly.    Dressing: The dressing is designed to be left in place until you  return to the office in 2 weeks.  The suction unit should stop functioning at 7 days and the green light will switch to yellow.  At that point the suction unit and tubing can be disconnected at the port closest to the dressing.  The suction unit and tubing may be discarded.  You may shower immediately upon return home, you will need to turn the pump off by depressing the orange button once and then you may disconnect the pump and tubing at the connection port.  After showering, shake off the excess water and reattach the tubing.  Restart the pump by depressing the orange button one time and you will notice the green light flashing again.  If the dressing becomes disloged or saturated it should be changed. Please refer to the JAIME information sheet if you have any questions about the dressing.  If you have a home health nurse or therapist they can be contacted to assist with dressing change or repair. You may also call the JAIME dressing hotline for questions related to the dressing (1-104.413.7140). If there still other problems or questions related to the dressing despite these measures then you can contact Jana at our office [phone].  If for some reason the JAIME dressing is removed, after 7 days the wound can be gently cleaned with antibacterial soap then allowed to dry and covered with a dry sterile dressing. The wound should be covered at all times except while showering.  Patient may change dressings daily and prn using sterile 4x4 and paper tape, and should call if any unusual drainage, redness or swelling.* Follow up appointment in 2 weeks-patient to call the office at [phone] to schedule.  Patient will be discharged on aspirin 325mg BID x 2weeks, then daily x 4weeks    Discharge Diagnosis:    Patient Active Problem List   Diagnosis   • Hypertension   • Hyperlipidemia   • Mood disorder (CMS/[facility])   • Allergic rhinitis   • Osteopenia   • Insomnia   • Chronic pain of right knee   • Nocturia   • Chronic  fatigue   • Chronic pain of left knee   • Other microscopic hematuria   • Acquired hypothyroidism   • Hypothyroidism   • Mild incontinence   • Primary osteoarthritis of right knee   • OA (osteoarthritis) of knee       Follow-up Appointments  Future Appointments   Date Time Provider Department Center   4/23/2019  3:40 PM Trevon Hunt MD [facility], [facility]  04/11/19  9:30 AM

## 2019-04-11 NOTE — PLAN OF CARE
Problem: Patient Care Overview  Goal: Plan of Care Review   04/11/19 1107   Coping/Psychosocial   Plan of Care Reviewed With patient   OTHER   Outcome Summary Pt. able to ambulate 100 feet, CGA x 1 with use of Rwx this AM. Pt. completed Right TKR ther. ex. protocol x 30 reps. Pt. to discharge to SNF this day.

## 2019-04-11 NOTE — PROGRESS NOTES
Continued Stay Note  [facility]     Patient Name: Kristie Franz  MRN: [id number]  Today's Date: 4/11/2019    Admit Date: 4/8/2019    Discharge Plan     Row Name 04/11/19 1221       Plan    Final Discharge Disposition Code  03 - skilled nursing facility (SNF)    Final Note  Pt d/c'ed to [facility] skilled, pt will transport to facility with family.        Discharge Codes    No documentation.       Expected Discharge Date and Time     Expected Discharge Date Expected Discharge Time    Apr 11, 2019             Maria Antonia Almonte RN

## 2019-04-11 NOTE — PLAN OF CARE
Problem: Patient Care Overview  Goal: Plan of Care Review  Outcome: Ongoing (interventions implemented as appropriate)   04/11/19 0310   Coping/Psychosocial   Plan of Care Reviewed With patient   OTHER   Outcome Summary Pt has done well through the night. Ambulating with walker use and 1 assist. Voiding per BRP. JAIME in place with no drainage. Pain well controlled with PO pain meds. D/C plan is for rehab today. Will continue to monitor. Monitoring BP with hx of HTN.    Plan of Care Review   Progress improving     Goal: Individualization and Mutuality  Outcome: Ongoing (interventions implemented as appropriate)    Goal: Discharge Needs Assessment  Outcome: Ongoing (interventions implemented as appropriate)   04/08/19 0741 04/08/19 1605 04/09/19 1004   Discharge Needs Assessment   Readmission Within the Last 30 Days --  --  no previous admission in last 30 days   Concerns to be Addressed --  --  discharge planning   Patient/Family Anticipates Transition to --  home with help/services --    Patient/Family Anticipated Services at Transition --   --    Transportation Concerns --  car, none --    Transportation Anticipated --  family or friend will provide --    Anticipated Changes Related to Illness --  none --    Equipment Needed After Discharge --  walker, rolling --    Discharge Facility/Level of Care Needs --  --  nursing facility, skilled   Disability   Equipment Currently Used at Home none --  --      Goal: Interprofessional Rounds/Family Conf  Outcome: Ongoing (interventions implemented as appropriate)   04/11/19 0310   Interdisciplinary Rounds/Family Conf   Participants nursing;patient;       Problem: Fall Risk (Adult)  Goal: Absence of Fall  Outcome: Ongoing (interventions implemented as appropriate)   04/11/19 0310   Fall Risk (Adult)   Absence of Fall achieves outcome       Problem: Knee Arthroplasty (Total, Partial) (Adult)  Goal: Signs and Symptoms of Listed Potential Problems Will be  Absent, Minimized or Managed (Knee Arthroplasty)  Outcome: Ongoing (interventions implemented as appropriate)   04/11/19 0310   Goal/Outcome Evaluation   Problems Assessed (Knee Arthroplasty) all   Problems Present (Knee Arthroplasty) pain;range of motion decreased

## 2019-04-11 NOTE — DISCHARGE INSTR - ACTIVITY
Patient is to continue with physical therapy exercises daily and continue working with the physical therapist daily. Apply ice regularly. Patient may ice for long periods of time as long as ice is not directly on the skin. Patient instructed on frequent calf pumping exercises.  Patient also instructed on incentive spirometer during hospitalization and encouraged to continue to use at home regularly.    Dressing: The dressing is designed to be left in place until you return to the office in 2 weeks.  The suction unit should stop functioning at 7 days and the green light will switch to yellow.  At that point the suction unit and tubing can be disconnected at the port closest to the dressing.  The suction unit and tubing may be discarded.  You may shower immediately upon return home, you will need to turn the pump off by depressing the orange button once and then you may disconnect the pump and tubing at the connection port.  After showering, shake off the excess water and reattach the tubing.  Restart the pump by depressing the orange button one time and you will notice the green light flashing again.  If the dressing becomes disloged or saturated it should be changed. Please refer to the JAIME information sheet if you have any questions about the dressing.  If you have a home health nurse or therapist they can be contacted to assist with dressing change or repair. You may also call the JAIME dressing hotline for questions related to the dressing (1-998.327.9682). If there still other problems or questions related to the dressing despite these measures then you can contact Jana at our office [phone].  If for some reason the JAIME dressing is removed, after 7 days the wound can be gently cleaned with antibacterial soap then allowed to dry and covered with a dry sterile dressing. The wound should be covered at all times except while showering.  Patient may change dressings daily and prn using sterile 4x4 and paper  tape, and should call if any unusual drainage, redness or swelling.* Follow up appointment in 2 weeks-patient to call the office at [phone] to schedule.  Patient will be discharged on aspirin 325mg BID x 2weeks, then daily x 4weeks

## 2019-04-12 ENCOUNTER — EPISODE CHANGES (OUTPATIENT)
Dept: CASE MANAGEMENT | Facility: OTHER | Age: 79
End: 2019-04-12

## 2019-04-15 ENCOUNTER — TELEPHONE (OUTPATIENT)
Dept: ORTHOPEDIC SURGERY | Facility: CLINIC | Age: 79
End: 2019-04-15

## 2019-04-15 NOTE — TELEPHONE ENCOUNTER
Spoke with patient's daughter.  They are happy at [facility] and are planning to transfer her to [facility].  I talked to the daughter apparently they have not spoken with anybody at [facility] and they are not aware of the patient's frustration.  Of advised her that Dr. Hunt does not have the authority to transfer her from one facility to the next.  Would recommend that they speak with the  for her mom at [facility] in order to tell her what is going on and they will make all the necessary arrangements.  I did speak with the nurse manager at [facility] and they were very surprised and not aware of any problem whatsoever.  She will work the nurse manager will get in contact with the patient and the family and work with him to make whatever arrangements would be best for the patient

## 2019-04-22 ENCOUNTER — EPISODE CHANGES (OUTPATIENT)
Dept: CASE MANAGEMENT | Facility: OTHER | Age: 79
End: 2019-04-22

## 2019-04-23 ENCOUNTER — OFFICE VISIT (OUTPATIENT)
Dept: ORTHOPEDIC SURGERY | Facility: CLINIC | Age: 79
End: 2019-04-23

## 2019-04-23 DIAGNOSIS — Z96.651 STATUS POST RIGHT KNEE REPLACEMENT: Primary | ICD-10-CM

## 2019-04-23 PROCEDURE — 99024 POSTOP FOLLOW-UP VISIT: CPT | Performed by: ORTHOPAEDIC SURGERY

## 2019-04-23 NOTE — PROGRESS NOTES
Kristie Franz : 1940 MRN: [id number] DATE: 2019    DIAGNOSIS: 2 week follow up right total knee      SUBJECTIVE:Patient returns today for 2 week follow up of right total knee replacement. Patient reports doing well with no unusual complaints. Appears to be progressing appropriately.    OBJECTIVE:   Exam:. The incision is healing appropriately. No sign of infection. Range of motion is progressing as expected. The calf is soft and nontender with a negative Homans sign.    ASSESSMENT: 2 week status post right knee replacement.    PLAN: 1) Staples removed and steri strips applied   2) Order given for PT  -OP as [facility]   3) continue ace wraps prn swelling   4) Continue ice PRN   5) aspirin 325 mg orally every day for 1 month   6) Follow up in 6 weeks with repeat Xrays of right knee (3views)   7) no norco -, just tylenol from here forward    Trevon Hunt MD  2019

## 2019-04-26 ENCOUNTER — TREATMENT (OUTPATIENT)
Dept: PHYSICAL THERAPY | Facility: CLINIC | Age: 79
End: 2019-04-26

## 2019-04-26 DIAGNOSIS — M25.561 RIGHT KNEE PAIN, UNSPECIFIED CHRONICITY: ICD-10-CM

## 2019-04-26 DIAGNOSIS — Z96.651 S/P TOTAL KNEE ARTHROPLASTY, RIGHT: Primary | ICD-10-CM

## 2019-04-26 DIAGNOSIS — R29.898 WEAKNESS OF RIGHT LOWER EXTREMITY: ICD-10-CM

## 2019-04-26 DIAGNOSIS — M25.661 KNEE STIFFNESS, RIGHT: ICD-10-CM

## 2019-04-26 PROCEDURE — 97110 THERAPEUTIC EXERCISES: CPT | Performed by: PHYSICAL THERAPIST

## 2019-04-26 PROCEDURE — 97161 PT EVAL LOW COMPLEX 20 MIN: CPT | Performed by: PHYSICAL THERAPIST

## 2019-04-26 NOTE — PROGRESS NOTES
Physical Therapy Initial Evaluation and Plan of Care    Patient: Kristie Franz   : 1940  Diagnosis/ICD-10 Code:  S/P total knee arthroplasty, right [Z96.651]  Referring practitioner: Trevon Hunt MD  Date of Initial Visit: 2019  Today's Date: 2019    Subjective Evaluation    History of Present Illness  Date of surgery: 2019  Mechanism of injury: S/p right TKA on 2019. 3 days inpatient then rehab facility for 10 days, D/C'd this morning. Has a walker and a cane, does not have either today but uses them sometimes.    Quality of life: good    Pain  Current pain rating: 3  At best pain ratin  At worst pain ratin  Location: right knee  Quality: dull ache  Relieving factors: ice  Exacerbated by: sitting for long periods.  Progression: improved    Social Support  Patient lives at: finished basement, but doesn't have to use.  Lives with: alone (adult children are helping as needed)    Diagnostic Tests  Abnormal x-ray: OA prior to surgery.    Treatments  Discharged from (in last 30 days): inpatient hospitalization and skilled nursing facility  Patient Goals  Patient goals for therapy: decreased pain, increased strength, independence with ADLs/IADLs, return to sport/leisure activities and improved balance             Objective       Observations     Right Knee   Positive for atrophy, edema and incision. Negative for deformity.     Additional Observation Details  Quad atrophy; incision is healing well with no signs of infection      Neurological Testing     Sensation     Knee   Left Knee   Intact: light touch    Right Knee   Intact: light touch     Active Range of Motion   Left Knee   Flexion: 118 degrees   Extensor la degrees     Right Knee   Flexion: 95 degrees   Extensor la degrees     Patellar Mobility     Right Knee Hypomobile in the medial, lateral, superior and inferior patellar tendon(s).     Patellar Static Positioning   Left Knee: WFL  Right Knee: WFL    Strength/Myotome  "Testing     Left Hip   Planes of Motion   Flexion: 4  Extension: 4  Abduction: 4    Right Hip   Planes of Motion   Flexion: 4-  Extension: 4-  Abduction: 4-    Left Knee   Flexion: 4+  Extension: 4+    Right Knee   Flexion: 3-  Extension: 3-  Quadriceps contraction: fair    Tests     Additional Tests Details  Special tests deferred secondary to post op status    Ambulation     Ambulation: Level Surfaces   Ambulation without assistive device: independent    Observational Gait   Gait: antalgic   Increased left stance time and right swing time. Decreased walking speed, stride length, right stance time and left swing time.   Left foot contact pattern: heel to toe  Right foot contact pattern: heel to toe    Additional Observational Gait Details  Decreased knee extension on swing phase     Functional Assessment     Forward Step Up 6\"     Right Leg  Pain, increased forward trunk lean and increased contralateral push off.          Assessment & Plan     Assessment  Impairments: abnormal gait, abnormal or restricted ROM, activity intolerance, impaired balance, impaired physical strength, lacks appropriate home exercise program and pain with function  Assessment details: Ms. Franz is a pleasant 78 year old female who presents with limitations in ROM, strength, balance and gait consistent with post-op status.  Because of impairments she is limited in self care and home management activities, and is unable to perform her usual exercise routine. She will benefit from PT to address impairments to allow pt to return to normal daily activities without limitation.  Prognosis: good  Functional Limitations: walking, uncomfortable because of pain and standing  Goals  Plan Goals: Short Term Goals: 4 weeks. Patient will:  1. Patient to be adherent with stretching and HEP.  2. Able to sit x 5 then stand with < 2/10 pain.   3. (R) MMT 4/5 for ability to ascend/descend 5 steps and transfer sit to stand x 5 with knee pain <2/10  4. Increased " hip joint, patellar mobility to allow for decreased stress at tibiofemoral, patellofemoral joint. (knee ROM 5°-110°)  5. Pt. to exhibit increased LE soft tissue extensibility to allow for increased ease with walking 10 minutes.     Long Term Goals: 6-8 weeks. Patient will:  1. Pt to score 75% perceived normal ability on LEFS  2. (R) LE strength to at least 4+/5 to ascend/descend 5 steps without pain >2/10.  3. Pt to exhibit improved Knee AROM 0 degrees- 120 degrees to allow for improved gait, kneeling, bending squatting as is necessary for daily tasks.    4. Pt to exhibit LE endurance/strength to 4+/5 to allow for returning to unrestricted walking > 20 min.    5. Negative findings for special testing for dysfunction.   6. Pt to demonstrate increased stability of the knee to balance on right for 30 seconds as needed to traverse uneven terrain .      Plan  Therapy options: will be seen for skilled physical therapy services  Planned modality interventions: cryotherapy, electrical stimulation/Russian stimulation, TENS and thermotherapy (hydrocollator packs)  Planned therapy interventions: abdominal trunk stabilization, manual therapy, soft tissue mobilization, strengthening, stretching, therapeutic activities, home exercise program, gait training, functional ROM exercises and flexibility  Frequency: 2-3x per week.  Duration in weeks: 12  Treatment plan discussed with: patient        Manual Therapy:    0     mins  51077;  Therapeutic Exercise:    25     mins  06709;     Neuromuscular Honey:    0    mins  30497;    Therapeutic Activity:     0     mins  32826;     Gait Trainin     mins  56368;     Ultrasound:     0     mins  18431;    Electrical Stimulation:    0     mins  67764 ( );    Timed Treatment:   25   mins   Total Treatment:     60   mins    PT SIGNATURE: Danyelle Stokes PT, DPT          Physical Therapist                               [place] License #[id number]    DATE TREATMENT INITIATED: 2019    Initial  Certification  Certification Period: 7/25/2019  I certify that the therapy services are furnished while this patient is under my care.  The services outlined above are required by this patient, and will be reviewed every 90 days.     PHYSICIAN: Trevon Hunt MD      DATE:     Please sign and return via fax to [phone].. Thank you, [facility].

## 2019-04-29 ENCOUNTER — TELEPHONE (OUTPATIENT)
Dept: ORTHOPEDIC SURGERY | Facility: CLINIC | Age: 79
End: 2019-04-29

## 2019-04-29 ENCOUNTER — TREATMENT (OUTPATIENT)
Dept: PHYSICAL THERAPY | Facility: CLINIC | Age: 79
End: 2019-04-29

## 2019-04-29 ENCOUNTER — PATIENT OUTREACH (OUTPATIENT)
Dept: CASE MANAGEMENT | Facility: OTHER | Age: 79
End: 2019-04-29

## 2019-04-29 DIAGNOSIS — R29.898 WEAKNESS OF RIGHT LOWER EXTREMITY: ICD-10-CM

## 2019-04-29 DIAGNOSIS — M25.661 KNEE STIFFNESS, RIGHT: ICD-10-CM

## 2019-04-29 DIAGNOSIS — M25.561 RIGHT KNEE PAIN, UNSPECIFIED CHRONICITY: ICD-10-CM

## 2019-04-29 DIAGNOSIS — Z96.651 S/P TOTAL KNEE ARTHROPLASTY, RIGHT: Primary | ICD-10-CM

## 2019-04-29 PROCEDURE — 97110 THERAPEUTIC EXERCISES: CPT | Performed by: PHYSICAL THERAPIST

## 2019-04-29 NOTE — PROGRESS NOTES
" Physical Therapy Daily Progress Note    Visit #2    Subjective     Kristie Franz reports: her knee feels \"pretty good\", the swelling in her leg is causing her the most discomfort. Brought her ace wraps today that were used to wrap her leg.      Objective   See Exercise, Manual, and Modality Logs for complete treatment.   Wrapped pts foot/ankle/lower leg with ace wraps, distal to proximal, after ice and elevation at end of treatment.    Assessment/Plan  ROM and gait are improving, as is quad strength.   Progress per Plan of Care           Manual Therapy:    0     mins  25718;  Therapeutic Exercise:    40     mins  17638;     Neuromuscular Honey:    0    mins  62391;    Therapeutic Activity:     0     mins  33096;     Gait Trainin     mins  60035;     Ultrasound:     0     mins  83208;    Electrical Stimulation:    0     mins  12803 ( );    Timed Treatment:   40   mins   Total Treatment:     50   mins    Danyelle Stokes PT, DPT  Physical Therapist  [place] License #[id number]                    "

## 2019-04-29 NOTE — PATIENT INSTRUCTIONS
Pt was educated regarding relevant anatomy/physiology and plan of care.      Access Code: [email or web address]   URL: [email or web address]   Date: 04/26/2019   Prepared by: Danyelle Stokes     Exercises   Supine Active Straight Leg Raise - 10 reps - 2 sets - 1x daily   Supine Quadricep Sets - 10 reps - 2 sets - 5 Hold - 1x daily   Supine Heel Slide with Strap - 10 reps - 1 sets - 10 hold - 1x daily   Supine Hamstring Stretch with Strap - 3 reps - 20 hold - 1x daily   Standing Hip Abduction - 10 reps - 2 sets - 1x daily   Heel Raises with Counter Support - 10 reps - 2 sets - 1x daily   Mini Squat with Counter Support - 10 reps - 2 sets - 1x daily   Standing Gastroc Stretch on Step with Counter Support - 3 reps - 20 hold - 1x daily

## 2019-04-29 NOTE — OUTREACH NOTE
Patient Outreach Note    1st Attempt:  Voicemail message left.    Hero Roque RN    4/29/2019, 10:57 AM

## 2019-04-30 ENCOUNTER — TREATMENT (OUTPATIENT)
Dept: PHYSICAL THERAPY | Facility: CLINIC | Age: 79
End: 2019-04-30

## 2019-04-30 DIAGNOSIS — Z96.651 S/P TOTAL KNEE ARTHROPLASTY, RIGHT: Primary | ICD-10-CM

## 2019-04-30 DIAGNOSIS — M25.661 KNEE STIFFNESS, RIGHT: ICD-10-CM

## 2019-04-30 DIAGNOSIS — R29.898 WEAKNESS OF RIGHT LOWER EXTREMITY: ICD-10-CM

## 2019-04-30 DIAGNOSIS — M25.561 RIGHT KNEE PAIN, UNSPECIFIED CHRONICITY: ICD-10-CM

## 2019-04-30 PROCEDURE — 97110 THERAPEUTIC EXERCISES: CPT | Performed by: PHYSICAL THERAPIST

## 2019-04-30 NOTE — PROGRESS NOTES
Physical Therapy Daily Progress Note    Visit #3    Subjective     Kristie Franz reports: continued adherence with HEP. Was able to wear her compression sock on her right LE and it helped with swelling.      Objective   See Exercise, Manual, and Modality Logs for complete treatment.       Assessment/Plan  Step length and TKE with gait improving.  Progress per Plan of Care           Manual Therapy:    0     mins  34179;  Therapeutic Exercise:    35     mins  63789;   direct  Neuromuscular Honey:    0    mins  00755;    Therapeutic Activity:     0     mins  58921;     Gait Trainin     mins  98571;     Ultrasound:     0     mins  66321;    Electrical Stimulation:    0     mins  16061 ( );    Timed Treatment:   35   mins   Total Treatment:     50   mins    Danyelle Stokes PT, DPT  Physical Therapist  [place] License #[id number]

## 2019-05-01 RX ORDER — CITALOPRAM 20 MG/1
TABLET ORAL
Qty: 90 TABLET | Refills: 0 | Status: SHIPPED | OUTPATIENT
Start: 2019-05-01 | End: 2019-08-05 | Stop reason: SDUPTHER

## 2019-05-03 ENCOUNTER — TREATMENT (OUTPATIENT)
Dept: PHYSICAL THERAPY | Facility: CLINIC | Age: 79
End: 2019-05-03

## 2019-05-03 DIAGNOSIS — M25.561 RIGHT KNEE PAIN, UNSPECIFIED CHRONICITY: ICD-10-CM

## 2019-05-03 DIAGNOSIS — Z96.651 S/P TOTAL KNEE ARTHROPLASTY, RIGHT: Primary | ICD-10-CM

## 2019-05-03 DIAGNOSIS — M25.661 KNEE STIFFNESS, RIGHT: ICD-10-CM

## 2019-05-03 DIAGNOSIS — R29.898 WEAKNESS OF RIGHT LOWER EXTREMITY: ICD-10-CM

## 2019-05-03 PROCEDURE — 97110 THERAPEUTIC EXERCISES: CPT | Performed by: PHYSICAL THERAPIST

## 2019-05-03 NOTE — PROGRESS NOTES
Physical Therapy Daily Progress Note    Visit #4    Subjective     Kristie Franz reports: her leg swelling has been a little better, has been wearing her compression stockings.      Objective   See Exercise, Manual, and Modality Logs for complete treatment.       Assessment/Plan  ROM is progressing well. Pt will benefit from continued PT to increase ROM and strength and normalize gait.  Progress per Plan of Care           Manual Therapy:    0     mins  89282;  Therapeutic Exercise:    35     mins  55338;   direct  Neuromuscular Honey:    0    mins  52170;    Therapeutic Activity:     0     mins  19243;     Gait Trainin     mins  45369;     Ultrasound:     0     mins  62819;    Electrical Stimulation:    0     mins  67450 ( );    Timed Treatment:   35   mins   Total Treatment:     50   mins    Danyelle Stokes, PT, DPT  Physical Therapist  [place] License #[id number]

## 2019-05-06 ENCOUNTER — TREATMENT (OUTPATIENT)
Dept: PHYSICAL THERAPY | Facility: CLINIC | Age: 79
End: 2019-05-06

## 2019-05-06 DIAGNOSIS — R29.898 WEAKNESS OF RIGHT LOWER EXTREMITY: ICD-10-CM

## 2019-05-06 DIAGNOSIS — Z96.651 S/P TOTAL KNEE ARTHROPLASTY, RIGHT: Primary | ICD-10-CM

## 2019-05-06 DIAGNOSIS — M25.561 RIGHT KNEE PAIN, UNSPECIFIED CHRONICITY: ICD-10-CM

## 2019-05-06 DIAGNOSIS — M25.661 KNEE STIFFNESS, RIGHT: ICD-10-CM

## 2019-05-06 PROCEDURE — 97110 THERAPEUTIC EXERCISES: CPT | Performed by: PHYSICAL THERAPIST

## 2019-05-06 NOTE — PROGRESS NOTES
Physical Therapy Daily Progress Note    Visit #5    Subjective     Kristie Franz reports: her blood pressure has been low, so she has stopped taking her diuretic.      Objective   See Exercise, Manual, and Modality Logs for complete treatment.   BP in clinic = 135/77 mmHg    Assessment/Plan  Advised pt to contact MD regarding BP questions. Pt ambulates with decreased hip/knee flexion, improved after Rx.   Progress per Plan of Care           Manual Therapy:    0     mins  78112;  Therapeutic Exercise:    30     mins  41614;   direct  Neuromuscular Honey:    0    mins  18017;    Therapeutic Activity:     0     mins  93839;     Gait Trainin     mins  75867;     Ultrasound:     0     mins  66418;    Electrical Stimulation:    0     mins  96115 ( );    Timed Treatment:   30   mins   Total Treatment:     40   mins    Danyelle Stokes PT, DPT  Physical Therapist  [place] License #[id number]

## 2019-05-08 ENCOUNTER — OFFICE VISIT (OUTPATIENT)
Dept: PHYSICAL THERAPY | Facility: CLINIC | Age: 79
End: 2019-05-08

## 2019-05-08 DIAGNOSIS — M25.661 KNEE STIFFNESS, RIGHT: ICD-10-CM

## 2019-05-08 DIAGNOSIS — M25.562 CHRONIC PAIN OF BOTH KNEES: ICD-10-CM

## 2019-05-08 DIAGNOSIS — R29.898 WEAKNESS OF RIGHT LOWER EXTREMITY: ICD-10-CM

## 2019-05-08 DIAGNOSIS — G89.29 CHRONIC PAIN OF BOTH KNEES: ICD-10-CM

## 2019-05-08 DIAGNOSIS — M25.561 RIGHT KNEE PAIN, UNSPECIFIED CHRONICITY: ICD-10-CM

## 2019-05-08 DIAGNOSIS — Z96.651 S/P TOTAL KNEE ARTHROPLASTY, RIGHT: Primary | ICD-10-CM

## 2019-05-08 DIAGNOSIS — M25.561 CHRONIC PAIN OF BOTH KNEES: ICD-10-CM

## 2019-05-08 PROCEDURE — 97116 GAIT TRAINING THERAPY: CPT | Performed by: PHYSICAL THERAPIST

## 2019-05-08 PROCEDURE — 97110 THERAPEUTIC EXERCISES: CPT | Performed by: PHYSICAL THERAPIST

## 2019-05-08 NOTE — PROGRESS NOTES
Physical Therapy Daily Progress Note        Patient: Kristie Franz   : 1940  Diagnosis/ICD-10 Code:  S/P total knee arthroplasty, right [Z96.651]  Referring practitioner: Trevon Hunt MD  Date of Initial Visit: Type: THERAPY  Noted: 2019  Today's Date: 2019  Patient seen for 6 sessions         Kristie Franz reports: her BP was fine this morning but the bottom was low another time. She said her leg was more swollen yesterday and she put compression stockings on.        Subjective     Objective       Observations     Additional Observation Details  Light pink/redness at lateral distal knee/proximal lower leg    General Comments     Knee Comments  Negative Saba;  BK=000/85mmHg     See Exercise, Manual, and Modality Logs for complete treatment.       Assessment & Plan     Assessment  Assessment details: Pt continued to amb with decreased knee flexion and no change in gait pattern following hurdles in the //bars. She also demonstrated increased hip flex/hiking to compensate while completing hurdles with mild correction following cues. Despite minor red/pinkness to distal/lateral knew, warmth was WNL for healing with no pitting edema. Negative Saba thus instructed pt to monitor color and alert MD if it worsens.        Progress per Plan of Care           Manual Therapy:         mins  83866;  Therapeutic Exercise:    35     mins  67630;     Neuromuscular Honey:        mins  69038;    Therapeutic Activity:          mins  42843;     Gait Training:      10     mins  37625;     Ultrasound:          mins  54876;    Electrical Stimulation:         mins  78979 ( );  Dry Needling          mins self-pay    Timed Treatment:   45   mins   Total Treatment:     60   mins    Saadia Otto PTA  Physical Therapist Assistant

## 2019-05-10 ENCOUNTER — TREATMENT (OUTPATIENT)
Dept: PHYSICAL THERAPY | Facility: CLINIC | Age: 79
End: 2019-05-10

## 2019-05-10 DIAGNOSIS — M25.561 RIGHT KNEE PAIN, UNSPECIFIED CHRONICITY: ICD-10-CM

## 2019-05-10 DIAGNOSIS — Z96.651 S/P TOTAL KNEE ARTHROPLASTY, RIGHT: Primary | ICD-10-CM

## 2019-05-10 DIAGNOSIS — R29.898 WEAKNESS OF RIGHT LOWER EXTREMITY: ICD-10-CM

## 2019-05-10 DIAGNOSIS — M25.661 KNEE STIFFNESS, RIGHT: ICD-10-CM

## 2019-05-10 PROCEDURE — 97110 THERAPEUTIC EXERCISES: CPT | Performed by: PHYSICAL THERAPIST

## 2019-05-10 NOTE — PROGRESS NOTES
Physical Therapy Daily Progress Note    Visit #7    Adriano     Kristie Franz reports: no new complaints      Objective   See Exercise, Manual, and Modality Logs for complete treatment.       Assessment/Plan  Improved hip/knee flexion with addition of high toe taps on foam roll.   Progress per Plan of Care           Manual Therapy:    0     mins  95897;  Therapeutic Exercise:    45     mins  17952;     Neuromuscular Honey:    0    mins  98667;    Therapeutic Activity:     0     mins  19830;     Gait Trainin     mins  35690;     Ultrasound:     0     mins  46571;    Electrical Stimulation:    0     mins  44469 ( );    Timed Treatment:   45   mins   Total Treatment:     55   mins    Danyelle Stokes PT, DPT  Physical Therapist  [place] License #[id number]

## 2019-05-13 ENCOUNTER — TREATMENT (OUTPATIENT)
Dept: PHYSICAL THERAPY | Facility: CLINIC | Age: 79
End: 2019-05-13

## 2019-05-13 DIAGNOSIS — M25.561 RIGHT KNEE PAIN, UNSPECIFIED CHRONICITY: ICD-10-CM

## 2019-05-13 DIAGNOSIS — R29.898 WEAKNESS OF RIGHT LOWER EXTREMITY: ICD-10-CM

## 2019-05-13 DIAGNOSIS — M25.661 KNEE STIFFNESS, RIGHT: ICD-10-CM

## 2019-05-13 DIAGNOSIS — Z96.651 S/P TOTAL KNEE ARTHROPLASTY, RIGHT: Primary | ICD-10-CM

## 2019-05-13 PROCEDURE — 97110 THERAPEUTIC EXERCISES: CPT | Performed by: PHYSICAL THERAPIST

## 2019-05-13 PROCEDURE — 97140 MANUAL THERAPY 1/> REGIONS: CPT | Performed by: PHYSICAL THERAPIST

## 2019-05-13 NOTE — PROGRESS NOTES
Physical Therapy Daily Progress Note    Visit #8    Subjective     Kristie Franz reports: feeling good today.      Objective   See Exercise, Manual, and Modality Logs for complete treatment.       Assessment/Plan  Increased work on gait pattern today, after Rx noted longer step length and improved hip knee flexion. Encouraged pt to continue working on this at home. Also initiated manual therapy today with patellar mobilizations and gentle soft tissue mobilization, pt tolerated well.  Progress per Plan of Care           Manual Therapy:    10     mins  88482;  Therapeutic Exercise:    35     mins  74161;   direct  Neuromuscular Honey:    0    mins  04993;    Therapeutic Activity:     0     mins  86745;     Gait Trainin     mins  73607;     Ultrasound:     0     mins  77919;    Electrical Stimulation:    0     mins  27477 ( );    Timed Treatment:   45   mins   Total Treatment:     70   mins    Danyelle Stokes PT, DPT  Physical Therapist  [place] License #[id number]

## 2019-05-16 ENCOUNTER — PATIENT OUTREACH (OUTPATIENT)
Dept: CASE MANAGEMENT | Facility: OTHER | Age: 79
End: 2019-05-16

## 2019-05-16 ENCOUNTER — TELEPHONE (OUTPATIENT)
Dept: ORTHOPEDIC SURGERY | Facility: CLINIC | Age: 79
End: 2019-05-16

## 2019-05-16 NOTE — TELEPHONE ENCOUNTER
Vinita, this patient has a lot of questions and concerns and she is requesting that you call her.

## 2019-05-16 NOTE — TELEPHONE ENCOUNTER
I have spoken with the patient she had some great days at the beginning of the week with physical therapy but started with foot pain yesterday she is able to bear weight on her foot but it is sore, she has tried compression and ice with some improvement.  Dr. Samuel is not able to see the patient.  She is refusing to go to the emergency room.  I offered her an appointment to see me on Tuesday at 4:00 and she understands she will be worked and so she will probably have a wait.  Patient verbalized understanding, put her on my schedule at that time but she will go to the emergency room immediately if her symptoms worsen

## 2019-05-16 NOTE — OUTREACH NOTE
"Patient Outreach Note    She is awaiting a call back from ortho, Dr. Trevon Hunt's office.  Reports unable to bear weight on right leg due to pain at the \"arch of my right foot.\"  Reports \"some\" edema.  Has not been adherent to daily Aspirin regimen as instructed and educated on the purpose of Aspirin following TKA.  She is using ice and advised to elevate right lower extremity above heart level.  Not wearing compression hose and advised to wrap lower extremity per Dr. Hunt's instructions at follow-up visit.  Active with OP physical therapy and no concerns with session on 5/13/19, and denies any injury to right foot.      Hero Roque RN    5/16/2019, 11:54 AM      "

## 2019-05-17 ENCOUNTER — TREATMENT (OUTPATIENT)
Dept: PHYSICAL THERAPY | Facility: CLINIC | Age: 79
End: 2019-05-17

## 2019-05-17 DIAGNOSIS — M25.561 RIGHT KNEE PAIN, UNSPECIFIED CHRONICITY: ICD-10-CM

## 2019-05-17 DIAGNOSIS — Z96.651 S/P TOTAL KNEE ARTHROPLASTY, RIGHT: Primary | ICD-10-CM

## 2019-05-17 DIAGNOSIS — M25.661 KNEE STIFFNESS, RIGHT: ICD-10-CM

## 2019-05-17 DIAGNOSIS — R29.898 WEAKNESS OF RIGHT LOWER EXTREMITY: ICD-10-CM

## 2019-05-17 PROCEDURE — 97110 THERAPEUTIC EXERCISES: CPT | Performed by: PHYSICAL THERAPIST

## 2019-05-17 PROCEDURE — 97140 MANUAL THERAPY 1/> REGIONS: CPT | Performed by: PHYSICAL THERAPIST

## 2019-05-17 NOTE — PROGRESS NOTES
Physical Therapy Daily Progress Note    Visit #9    Subjective     Kristie Franz reports: knee feels good, reports onset of pain in arch of right foot on Tuesday. Denies fall or other injury. Is seeing Vinita Pierson on Tuesday.      Objective       Active Range of Motion     Right Knee   Flexion: 121 (AAROM) degrees     Calf is soft and nontender, negative Jeimy's sign, no significant increase in edema in RLE.     See Exercise, Manual, and Modality Logs for complete treatment.       Assessment/Plan  Held most standing exercises today because of foot pain.  Progress per Plan of Care, reassess next visit           Manual Therapy:    12     mins  94592;  Therapeutic Exercise:    20     mins  35712;   direct  Neuromuscular Honey:    0    mins  05834;    Therapeutic Activity:     0     mins  71048;     Gait Trainin     mins  97038;     Ultrasound:     0     mins  04095;    Electrical Stimulation:    0     mins  36805 ( );    Timed Treatment:   32   mins   Total Treatment:     54   mins    Danyelle Stokes PT, DPT  Physical Therapist  [place] License #[id number]

## 2019-05-20 ENCOUNTER — HOSPITAL ENCOUNTER (EMERGENCY)
Facility: HOSPITAL | Age: 79
Discharge: HOME | End: 2019-05-20
Attending: EMERGENCY MEDICINE | Admitting: EMERGENCY MEDICINE

## 2019-05-20 ENCOUNTER — APPOINTMENT (OUTPATIENT)
Dept: CARDIOLOGY | Facility: HOSPITAL | Age: 79
End: 2019-05-20

## 2019-05-20 ENCOUNTER — TREATMENT (OUTPATIENT)
Dept: PHYSICAL THERAPY | Facility: CLINIC | Age: 79
End: 2019-05-20

## 2019-05-20 VITALS
RESPIRATION RATE: 20 BRPM | HEART RATE: 84 BPM | WEIGHT: 137 LBS | OXYGEN SATURATION: 97 % | BODY MASS INDEX: 25.86 KG/M2 | TEMPERATURE: 98.9 F | DIASTOLIC BLOOD PRESSURE: 78 MMHG | SYSTOLIC BLOOD PRESSURE: 125 MMHG | HEIGHT: 61 IN

## 2019-05-20 DIAGNOSIS — Z96.651 S/P TOTAL KNEE ARTHROPLASTY, RIGHT: Primary | ICD-10-CM

## 2019-05-20 DIAGNOSIS — M79.89 RIGHT LEG SWELLING: Primary | ICD-10-CM

## 2019-05-20 DIAGNOSIS — M25.661 KNEE STIFFNESS, RIGHT: ICD-10-CM

## 2019-05-20 DIAGNOSIS — M25.561 RIGHT KNEE PAIN, UNSPECIFIED CHRONICITY: ICD-10-CM

## 2019-05-20 DIAGNOSIS — R29.898 WEAKNESS OF RIGHT LOWER EXTREMITY: ICD-10-CM

## 2019-05-20 LAB
BH CV LOWER VASCULAR LEFT COMMON FEMORAL AUGMENT: NORMAL
BH CV LOWER VASCULAR LEFT COMMON FEMORAL COMPETENT: NORMAL
BH CV LOWER VASCULAR LEFT COMMON FEMORAL COMPRESS: NORMAL
BH CV LOWER VASCULAR LEFT COMMON FEMORAL PHASIC: NORMAL
BH CV LOWER VASCULAR LEFT COMMON FEMORAL SPONT: NORMAL
BH CV LOWER VASCULAR RIGHT COMMON FEMORAL AUGMENT: NORMAL
BH CV LOWER VASCULAR RIGHT COMMON FEMORAL COMPETENT: NORMAL
BH CV LOWER VASCULAR RIGHT COMMON FEMORAL COMPRESS: NORMAL
BH CV LOWER VASCULAR RIGHT COMMON FEMORAL PHASIC: NORMAL
BH CV LOWER VASCULAR RIGHT COMMON FEMORAL SPONT: NORMAL
BH CV LOWER VASCULAR RIGHT DISTAL FEMORAL COMPRESS: NORMAL
BH CV LOWER VASCULAR RIGHT GASTRONEMIUS COMPRESS: NORMAL
BH CV LOWER VASCULAR RIGHT GREATER SAPH AK COMPRESS: NORMAL
BH CV LOWER VASCULAR RIGHT GREATER SAPH BK COMPRESS: NORMAL
BH CV LOWER VASCULAR RIGHT MID FEMORAL AUGMENT: NORMAL
BH CV LOWER VASCULAR RIGHT MID FEMORAL COMPETENT: NORMAL
BH CV LOWER VASCULAR RIGHT MID FEMORAL COMPRESS: NORMAL
BH CV LOWER VASCULAR RIGHT MID FEMORAL PHASIC: NORMAL
BH CV LOWER VASCULAR RIGHT MID FEMORAL SPONT: NORMAL
BH CV LOWER VASCULAR RIGHT PERONEAL COMPRESS: NORMAL
BH CV LOWER VASCULAR RIGHT POPLITEAL AUGMENT: NORMAL
BH CV LOWER VASCULAR RIGHT POPLITEAL COMPETENT: NORMAL
BH CV LOWER VASCULAR RIGHT POPLITEAL COMPRESS: NORMAL
BH CV LOWER VASCULAR RIGHT POPLITEAL PHASIC: NORMAL
BH CV LOWER VASCULAR RIGHT POPLITEAL SPONT: NORMAL
BH CV LOWER VASCULAR RIGHT POSTERIOR TIBIAL COMPRESS: NORMAL
BH CV LOWER VASCULAR RIGHT PROXIMAL FEMORAL COMPRESS: NORMAL
BH CV LOWER VASCULAR RIGHT SAPHENOFEMORAL JUNCTION AUGMENT: NORMAL
BH CV LOWER VASCULAR RIGHT SAPHENOFEMORAL JUNCTION COMPETENT: NORMAL
BH CV LOWER VASCULAR RIGHT SAPHENOFEMORAL JUNCTION COMPRESS: NORMAL
BH CV LOWER VASCULAR RIGHT SAPHENOFEMORAL JUNCTION PHASIC: NORMAL
BH CV LOWER VASCULAR RIGHT SAPHENOFEMORAL JUNCTION SPONT: NORMAL
BH CV VAS POP FLUID COLLECTED: 1

## 2019-05-20 PROCEDURE — 97140 MANUAL THERAPY 1/> REGIONS: CPT | Performed by: PHYSICAL THERAPIST

## 2019-05-20 PROCEDURE — 99283 EMERGENCY DEPT VISIT LOW MDM: CPT

## 2019-05-20 PROCEDURE — 97110 THERAPEUTIC EXERCISES: CPT | Performed by: PHYSICAL THERAPIST

## 2019-05-20 PROCEDURE — 93971 EXTREMITY STUDY: CPT

## 2019-05-20 NOTE — PROGRESS NOTES
Re-Assessment / Re-Certification    Patient: Kristie Franz   : 1940  Diagnosis/ICD-10 Code:  S/P total knee arthroplasty, right [Z96.651]  Referring practitioner: Trevon Hunt MD  Date of Initial Visit: 2019  Today's Date: 2019  Patient seen for 10 sessions      Subjective:   Kristie Franz reports: she feels that her knee is doing well, right foot is still very painful and is limiting her activity. Is seeing MD tomorrow, may go to urgent care today.  Subjective Questionnaire: LEFS: 47/80 (improved from 21/80 at initial evaluation)  Clinical Progress: improved  Home Program Compliance: Yes  Treatment has included: therapeutic exercise, neuromuscular re-education, manual therapy, therapeutic activity, gait training and cryotherapy    Subjective   Objective       Active Range of Motion     Right Knee   Flexion: 120 degrees   Extensor la degrees      Assessment/Plan  Progress toward previous goals: Partially Met     Pt overall is making excellent progress toward goals with regard to her knee. Over the past week her progress has been very limited because of foot pain, but she has maintained ROM gains.     Short Term Goals: 4 weeks. Patient will:  1. Patient to be adherent with stretching and HEP. (MET)  2. Able to sit x 5 then stand with < 2/10 pain.  (MET)  3. (R) MMT 4/5 for ability to ascend/descend 5 steps and transfer sit to stand x 5 with knee pain <2/10 (MET)  4. Increased hip joint, patellar mobility to allow for decreased stress at tibiofemoral, patellofemoral joint. (knee ROM 5°-110°) (MET)  5. Pt. to exhibit increased LE soft tissue extensibility to allow for increased ease with walking 10 minutes.  (MET)    Long Term Goals: 6-8 weeks. Patient will:  1. Pt to score 75% perceived normal ability on LEFS (PROGRESSING)  2. (R) LE strength to at least 4+/5 to ascend/descend 5 steps without pain >2/10. (PROGRESSING)  3. Pt to exhibit improved Knee AROM 0 degrees- 120 degrees to allow for  improved gait, kneeling, bending squatting as is necessary for daily tasks.  (PROGRESSING)  4. Pt to exhibit LE endurance/strength to 4+/5 to allow for returning to unrestricted walking > 20 min.  (PROGRESSING)  5. Negative findings for special testing for dysfunction.   6. Pt to demonstrate increased stability of the knee to balance on right for 30 seconds as needed to traverse uneven terrain .        Recommendations: Continue as planned  Timeframe: 1 month  Prognosis to achieve goals: good    PT Signature: Danyelle Stokes, PT, DPT                         Physical Therapist                         [place] License #[id number]    Based upon review of the patient's progress and continued therapy plan, it is my medical opinion that Kristie Franz should continue physical therapy treatment at [facility]  [address] [place] [id number] [phone].    Signature: __________________________________  Trevon Hunt MD    Manual Therapy:    10     mins  11121;  Therapeutic Exercise:    30     mins  88863;     Neuromuscular Honey:    0    mins  69818;    Therapeutic Activity:     0     mins  17837;     Gait Trainin     mins  27187;     Ultrasound:     0     mins  84398;    Electrical Stimulation:    0     mins  55516 ( );    Timed Treatment:   40   mins direct  Total Treatment:     60   mins

## 2019-05-21 ENCOUNTER — TREATMENT (OUTPATIENT)
Dept: PHYSICAL THERAPY | Facility: CLINIC | Age: 79
End: 2019-05-21

## 2019-05-21 ENCOUNTER — TELEPHONE (OUTPATIENT)
Dept: ORTHOPEDIC SURGERY | Facility: CLINIC | Age: 79
End: 2019-05-21

## 2019-05-21 DIAGNOSIS — R26.9 GAIT DISTURBANCE: ICD-10-CM

## 2019-05-21 DIAGNOSIS — Z96.651 S/P TOTAL KNEE ARTHROPLASTY, RIGHT: Primary | ICD-10-CM

## 2019-05-21 PROCEDURE — 97110 THERAPEUTIC EXERCISES: CPT | Performed by: PHYSICAL THERAPIST

## 2019-05-21 PROCEDURE — 97140 MANUAL THERAPY 1/> REGIONS: CPT | Performed by: PHYSICAL THERAPIST

## 2019-05-21 NOTE — TELEPHONE ENCOUNTER
Patient called to cancel f/u appt for her right knee with MLL today. She ended up going to [facility] ER yesterday. Had a doppler done. She has a post op appt with MLL on 6/4 at EP office and would like to only see RBB that day instead. RBB is booked. Please advise if ok to add on RBB's schedule that day.

## 2019-05-21 NOTE — PROGRESS NOTES
Physical Therapy Daily Progress Note    Time In 10:00 am  Time Out 11:00 am    Patient: Kristie Franz   : 1940  Diagnosis/ICD-10 Code:  S/P total knee arthroplasty, right [Z96.651]  Referring practitioner: Trevon Hunt MD  Date of Initial Visit: Type: THERAPY  Noted: 2019  Today's Date: 2019  Patient seen for 11 sessions         Kristie Franz reports: Was admitted to the ED to check for DVT / blood clot. Results were negative and she is at full capacity for PT today        Subjective     Objective   See Exercise, Manual, and Modality Logs for complete treatment.       Assessment/Plan     Patient has anxiety after ED visit, but seems fine otherwise. Patient should continue skilled PT to continue strengthening and mobilizing knee to facilitate independence and function in ADLs and IADLS at home and in community    Progress per Plan of Care           Manual Therapy:    10     mins  81297;  Therapeutic Exercise:    30     mins  37633;     Neuromuscular Honey:    0    mins  95259;    Therapeutic Activity:     0     mins  34591;     Gait Trainin     mins  71402;     Ultrasound:     0     mins  74670;    Electrical Stimulation:    0     mins  03680 ( );  Dry Needling     0     mins self-pay    Timed Treatment:   40   mins   Total Treatment:     60   mins    Danyelle Stokes, PT  Physical Therapist

## 2019-05-21 NOTE — TELEPHONE ENCOUNTER
Left answering machine message for patient to call me back about appt. Will add-on RBB schedule at EP office on 6/4/19 @ 4:00.

## 2019-05-21 NOTE — TELEPHONE ENCOUNTER
Per Dr. Hunt, okay to see her on June 4, but she needs to be the last patient of the day and please let her know she will have a wait since she is being worked into his schedule

## 2019-05-21 NOTE — TELEPHONE ENCOUNTER
Patient returned call and was informed of appt with RBB at EP office on 6/4/19 @ 4:00 with possible wait time as add-on appt.

## 2019-05-23 ENCOUNTER — TREATMENT (OUTPATIENT)
Dept: PHYSICAL THERAPY | Facility: CLINIC | Age: 79
End: 2019-05-23

## 2019-05-23 DIAGNOSIS — Z96.651 S/P TOTAL KNEE ARTHROPLASTY, RIGHT: Primary | ICD-10-CM

## 2019-05-23 DIAGNOSIS — M25.561 RIGHT KNEE PAIN, UNSPECIFIED CHRONICITY: ICD-10-CM

## 2019-05-23 DIAGNOSIS — M25.661 KNEE STIFFNESS, RIGHT: ICD-10-CM

## 2019-05-23 DIAGNOSIS — R26.9 GAIT DISTURBANCE: ICD-10-CM

## 2019-05-23 PROCEDURE — 97110 THERAPEUTIC EXERCISES: CPT | Performed by: PHYSICAL THERAPIST

## 2019-05-23 PROCEDURE — 97140 MANUAL THERAPY 1/> REGIONS: CPT | Performed by: PHYSICAL THERAPIST

## 2019-05-23 NOTE — PROGRESS NOTES
Physical Therapy Daily Progress Note    Visit #12    Subjective     Kristie Franz reports: her foot is feeling some better, is less swollen. Feels like her knee has gotten stiff since her foot has limited her activity.      Objective   See Exercise, Manual, and Modality Logs for complete treatment.       Assessment/Plan  ROM still doing well, but pt does ambulate with limited active knee flexion/extension. Improved after exercises and cueing. Normal patellar mobility and improving soft tissue mobility.  Progress per Plan of Care           Manual Therapy:    15     mins  14009;  Therapeutic Exercise:    35     mins  31477;     Neuromuscular Honey:    0    mins  02992;    Therapeutic Activity:     0     mins  55487;     Gait Trainin     mins  92432;     Ultrasound:     0     mins  16102;    Electrical Stimulation:    0     mins  42835 ( );    Timed Treatment:   50   mins   Total Treatment:     60   mins    Danyelle Stokes PT, DPT  Physical Therapist  [place] License #[id number]

## 2019-05-29 ENCOUNTER — TREATMENT (OUTPATIENT)
Dept: PHYSICAL THERAPY | Facility: CLINIC | Age: 79
End: 2019-05-29

## 2019-05-29 DIAGNOSIS — M25.562 CHRONIC PAIN OF BOTH KNEES: ICD-10-CM

## 2019-05-29 DIAGNOSIS — M25.561 CHRONIC PAIN OF BOTH KNEES: ICD-10-CM

## 2019-05-29 DIAGNOSIS — M25.561 RIGHT KNEE PAIN, UNSPECIFIED CHRONICITY: ICD-10-CM

## 2019-05-29 DIAGNOSIS — R26.9 GAIT DISTURBANCE: ICD-10-CM

## 2019-05-29 DIAGNOSIS — R29.898 WEAKNESS OF RIGHT LOWER EXTREMITY: ICD-10-CM

## 2019-05-29 DIAGNOSIS — M25.661 KNEE STIFFNESS, RIGHT: ICD-10-CM

## 2019-05-29 DIAGNOSIS — Z96.651 S/P TOTAL KNEE ARTHROPLASTY, RIGHT: Primary | ICD-10-CM

## 2019-05-29 DIAGNOSIS — G89.29 CHRONIC PAIN OF BOTH KNEES: ICD-10-CM

## 2019-05-29 PROCEDURE — 97110 THERAPEUTIC EXERCISES: CPT | Performed by: PHYSICAL THERAPIST

## 2019-05-29 NOTE — PROGRESS NOTES
Physical Therapy Daily Progress Note        Patient: Kristie Franz   : 1940  Diagnosis/ICD-10 Code:  S/P total knee arthroplasty, right [Z96.651]  Referring practitioner: Trevon Hunt MD  Date of Initial Visit: Type: THERAPY  Noted: 2019  Today's Date: 2019  Patient seen for 13 sessions         Kristie Franz reports: her knee is doing good overall. She reported no problems since last session. She feel like she has moved forward since the swelling got out.        Subjective     Objective   See Exercise, Manual, and Modality Logs for complete treatment.       Assessment/Plan  Pt stated no complaints during the session including with progression of reps with calf raises. Increased time on nustep for progression of strength and endurance to assist with amb of community distances. She required cues for technique with exercises especially to decrease hip substitution with rebecca walks. Pt would benefit from progression of step up and overs and/or step downs next session to progress strength and safety with descending stairs reciprocally.    Progress per Plan of Care           Manual Therapy:         mins  77178;  Therapeutic Exercise:    35     mins  87799;     Neuromuscular Honey:        mins  75626;    Therapeutic Activity:          mins  70138;     Gait Training:           mins  25416;     Ultrasound:          mins  53863;    Electrical Stimulation:         mins  67780 ( );  Dry Needling          mins self-pay    Timed Treatment:   35   mins   Total Treatment:     60   mins    Saadia Otto PTA  Physical Therapist Assistant

## 2019-05-31 ENCOUNTER — TREATMENT (OUTPATIENT)
Dept: PHYSICAL THERAPY | Facility: CLINIC | Age: 79
End: 2019-05-31

## 2019-05-31 DIAGNOSIS — Z96.651 S/P TOTAL KNEE ARTHROPLASTY, RIGHT: Primary | ICD-10-CM

## 2019-05-31 PROCEDURE — 97110 THERAPEUTIC EXERCISES: CPT | Performed by: PHYSICAL THERAPIST

## 2019-05-31 NOTE — PROGRESS NOTES
Physical Therapy Daily Progress Note    Time In 11:00  Time Out 11:45    Patient: Kristie Franz   : 1940  Diagnosis/ICD-10 Code:  S/P total knee arthroplasty, right [Z96.651]  Referring practitioner: Trevon Hunt MD  Date of Initial Visit: Type: THERAPY  Noted: 2019  Today's Date: 2019  Patient seen for 14 sessions         Kristie Franz reports: knee is doing well, there is some tightness in peripatellar area.         Subjective     Objective   See Exercise, Manual, and Modality Logs for complete treatment.       Assessment/Plan  Patient presents almost two month s/p total knee arthroplasty on right knee. Patient has reduced ROM in knee extension and decreased quad strength. Patient is tolerating treament and progressing per protocl. Patient should continue skille PT to keep progressing in strength, mobility, and ROM through manual/exercise/modality therapy to help promote independence in ADLs and IADLs at home and in community.  Progress per Plan of Care           Manual Therapy:    5     mins  77265;  Therapeutic Exercise:    30     mins  34785;     Neuromuscular Honey:    0    mins  08538;    Therapeutic Activity:     0     mins  05200;     Gait Trainin     mins  97749;     Ultrasound:     0     mins  66291;    Electrical Stimulation:    0    mins  75861 ( );  Dry Needling    0      mins self-pay    Timed Treatment:   35   mins indirect due to warmup and ice  Total Treatment:     45   mins    Danyelle Stokes, PT  Physical Therapist

## 2019-06-03 ENCOUNTER — TREATMENT (OUTPATIENT)
Dept: PHYSICAL THERAPY | Facility: CLINIC | Age: 79
End: 2019-06-03

## 2019-06-03 DIAGNOSIS — Z96.651 S/P TOTAL KNEE ARTHROPLASTY, RIGHT: Primary | ICD-10-CM

## 2019-06-03 DIAGNOSIS — R26.9 GAIT DISTURBANCE: ICD-10-CM

## 2019-06-03 DIAGNOSIS — M25.661 KNEE STIFFNESS, RIGHT: ICD-10-CM

## 2019-06-03 DIAGNOSIS — M25.561 RIGHT KNEE PAIN, UNSPECIFIED CHRONICITY: ICD-10-CM

## 2019-06-03 PROCEDURE — 97140 MANUAL THERAPY 1/> REGIONS: CPT | Performed by: PHYSICAL THERAPIST

## 2019-06-03 PROCEDURE — 97110 THERAPEUTIC EXERCISES: CPT | Performed by: PHYSICAL THERAPIST

## 2019-06-04 ENCOUNTER — OFFICE VISIT (OUTPATIENT)
Dept: ORTHOPEDIC SURGERY | Facility: CLINIC | Age: 79
End: 2019-06-04

## 2019-06-04 VITALS — WEIGHT: 137 LBS | HEIGHT: 61 IN | TEMPERATURE: 97.7 F | BODY MASS INDEX: 25.86 KG/M2

## 2019-06-04 DIAGNOSIS — Z96.651 STATUS POST RIGHT KNEE REPLACEMENT: Primary | ICD-10-CM

## 2019-06-04 PROCEDURE — 73562 X-RAY EXAM OF KNEE 3: CPT | Performed by: ORTHOPAEDIC SURGERY

## 2019-06-04 PROCEDURE — 99024 POSTOP FOLLOW-UP VISIT: CPT | Performed by: ORTHOPAEDIC SURGERY

## 2019-06-04 NOTE — PROGRESS NOTES
Re-Assessment / Re-Certification    Patient: Kristie Franz   : 1940  Diagnosis/ICD-10 Code:  S/P total knee arthroplasty, right [Z96.651]  Referring practitioner: Trevon Hunt MD  Date of Initial Visit: 2019  Today's Date: 2019  Patient seen for 15 sessions      Subjective:   Kristie Franz reports: she feels that her knee is doing very well. Has been limited in her activity tolerance because of episode of foot pain.  Clinical Progress: improved  Home Program Compliance: Yes  Treatment has included: therapeutic exercise, neuromuscular re-education, manual therapy, gait training and cryotherapy    Subjective   Objective       Active Range of Motion     Right Knee   Flexion: 122 (AAROM) degrees   Extensor la degrees      Assessment/Plan  Progress toward previous goals: Partially Met    Short Term Goals: 4 weeks. Patient will:  1. Patient to be adherent with stretching and HEP. (MET)  2. Able to sit x 5 then stand with < 2/10 pain.  (MET)  3. (R) MMT 4/5 for ability to ascend/descend 5 steps and transfer sit to stand x 5 with knee pain <2/10 (MET)  4. Increased hip joint, patellar mobility to allow for decreased stress at tibiofemoral, patellofemoral joint. (knee ROM 5°-110°) (MET)  5. Pt. to exhibit increased LE soft tissue extensibility to allow for increased ease with walking 10 minutes.  (MET)    Long Term Goals: 6-8 weeks. Patient will:  1. Pt to score 75% perceived normal ability on LEFS (PROGRESSING)  2. (R) LE strength to at least 4+/5 to ascend/descend 5 steps without pain >2/10. (PROGRESSING)  3. Pt to exhibit improved Knee AROM 0 degrees- 120 degrees to allow for improved gait, kneeling, bending squatting as is necessary for daily tasks.  (PROGRESSING)  4. Pt to exhibit LE endurance/strength to 4+/5 to allow for returning to unrestricted walking > 20 min.  (PROGRESSING)  5. Negative findings for special testing for dysfunction. (MET)  6. Pt to demonstrate increased stability of the knee  to balance on right for 30 seconds as needed to traverse uneven terrain . (PROGRESSING)      Recommendations: Continue as planned, reducing frequency to 1-2x per week with increased HEP.  Timeframe: 1 month  Prognosis to achieve goals: good    PT Signature: Danyelle Stokes, PT, DPT                         Physical Therapist                         [place] License #[id number]    Based upon review of the patient's progress and continued therapy plan, it is my medical opinion that Kristie Franz should continue physical therapy treatment at [facility]  [address] [place] [id number] [phone].    Signature: __________________________________  Trevon Hunt MD    Manual Therapy:    15     mins  05109;  Therapeutic Exercise:    25     mins  46409;     Neuromuscular Honey:   0    mins  18463;    Therapeutic Activity:     0     mins  54031;     Gait Trainin     mins  74136;     Ultrasound:     0     mins  28558;    Electrical Stimulation:    0     mins  68745 ( );    Timed Treatment:   40   mins   Total Treatment:     60   mins

## 2019-06-04 NOTE — PROGRESS NOTES
Kristie Franz : 1940 MRN: [id number] DATE: 2019    DIAGNOSIS: 8 week follow up right total knee      SUBJECTIVE:Patient returns today for 8 week follow up of right total knee replacement. Patient reports doing well with no unusual complaints. Appears to be progressing appropriately.    OBJECTIVE:   Exam:. The incision is well healed. No sign of infection. Range of motion is measured at 0 to 120. The calf is soft and nontender with a negative Homans sign. Strength is progressing and the patient is ambulating appropriately.    DIAGNOSTIC STUDIES  Xrays: 3 views of the right knee (AP, lateral, and sunrise) were ordered and reviewed for evaluation of recent knee replacement. They demonstrate a well positioned, well aligned knee replacement without complicating factors noted. In comparison with previous films there has been no change.    ASSESSMENT: 8 week status post right knee replacement.    PLAN: 1) Continue with PT exercises as prescribed   2) Follow up in 10 months    Trevon Hunt MD  2019

## 2019-06-06 ENCOUNTER — PATIENT OUTREACH (OUTPATIENT)
Dept: CASE MANAGEMENT | Facility: OTHER | Age: 79
End: 2019-06-06

## 2019-06-06 ENCOUNTER — TREATMENT (OUTPATIENT)
Dept: PHYSICAL THERAPY | Facility: CLINIC | Age: 79
End: 2019-06-06

## 2019-06-06 DIAGNOSIS — M25.661 KNEE STIFFNESS, RIGHT: ICD-10-CM

## 2019-06-06 DIAGNOSIS — Z96.651 S/P TOTAL KNEE ARTHROPLASTY, RIGHT: Primary | ICD-10-CM

## 2019-06-06 DIAGNOSIS — M25.561 RIGHT KNEE PAIN, UNSPECIFIED CHRONICITY: ICD-10-CM

## 2019-06-06 DIAGNOSIS — R26.9 GAIT DISTURBANCE: ICD-10-CM

## 2019-06-06 PROCEDURE — 97110 THERAPEUTIC EXERCISES: CPT | Performed by: PHYSICAL THERAPIST

## 2019-06-06 PROCEDURE — 97140 MANUAL THERAPY 1/> REGIONS: CPT | Performed by: PHYSICAL THERAPIST

## 2019-06-06 NOTE — OUTREACH NOTE
Patient Outreach Note    Patient has met care plan goals, all care gaps have been addressed, and patient has a strong sense of health self-management.Denies need for further CA services.  Progressing well and continues with OP PT.   The patient's annual wellness visit has been completed. . Mychart activation has been discussed and patient is active.  .Daughters are designated healthcare surrogates.      Hero Roque RN    6/6/2019, 12:39 PM

## 2019-06-10 ENCOUNTER — TREATMENT (OUTPATIENT)
Dept: PHYSICAL THERAPY | Facility: CLINIC | Age: 79
End: 2019-06-10

## 2019-06-10 DIAGNOSIS — Z96.651 S/P TOTAL KNEE ARTHROPLASTY, RIGHT: Primary | ICD-10-CM

## 2019-06-10 DIAGNOSIS — R26.9 GAIT DISTURBANCE: ICD-10-CM

## 2019-06-10 DIAGNOSIS — M25.661 KNEE STIFFNESS, RIGHT: ICD-10-CM

## 2019-06-10 PROCEDURE — 97110 THERAPEUTIC EXERCISES: CPT | Performed by: PHYSICAL THERAPIST

## 2019-06-10 PROCEDURE — 97140 MANUAL THERAPY 1/> REGIONS: CPT | Performed by: PHYSICAL THERAPIST

## 2019-06-10 NOTE — PROGRESS NOTES
Physical Therapy Daily Progress Note    Visit #17    Subjective     Kristie Franz reports: knee is feeling pretty good today. Leaves for vacation later this week.      Objective   See Exercise, Manual, and Modality Logs for complete treatment.       Assessment/Plan  Good patellar mobility. Added TM walk for gait symmetry.   Progress per Plan of Care           Manual Therapy:    15     mins  78408;  Therapeutic Exercise:    35     mins  94809;     Neuromuscular Honey:    0    mins  60988;    Therapeutic Activity:     0     mins  32247;     Gait Trainin     mins  66866;     Ultrasound:     0     mins  20161;    Electrical Stimulation:    0     mins  79628 ( );    Timed Treatment:   50   mins   Total Treatment:     60   mins    Danyelle Stokes PT, DPT  Physical Therapist  [place] License #[id number]

## 2019-06-11 ENCOUNTER — EPISODE CHANGES (OUTPATIENT)
Dept: CASE MANAGEMENT | Facility: OTHER | Age: 79
End: 2019-06-11

## 2019-06-11 ENCOUNTER — TREATMENT (OUTPATIENT)
Dept: PHYSICAL THERAPY | Facility: CLINIC | Age: 79
End: 2019-06-11

## 2019-06-11 DIAGNOSIS — Z96.651 S/P TOTAL KNEE ARTHROPLASTY, RIGHT: Primary | ICD-10-CM

## 2019-06-11 DIAGNOSIS — R26.9 GAIT DISTURBANCE: ICD-10-CM

## 2019-06-11 DIAGNOSIS — M25.661 KNEE STIFFNESS, RIGHT: ICD-10-CM

## 2019-06-11 DIAGNOSIS — M25.561 RIGHT KNEE PAIN, UNSPECIFIED CHRONICITY: ICD-10-CM

## 2019-06-11 PROCEDURE — 97110 THERAPEUTIC EXERCISES: CPT | Performed by: PHYSICAL THERAPIST

## 2019-06-11 PROCEDURE — 97140 MANUAL THERAPY 1/> REGIONS: CPT | Performed by: PHYSICAL THERAPIST

## 2019-06-14 NOTE — PROGRESS NOTES
Physical Therapy Daily Progress Note    Visit #18    Subjective     Kristie Franz reports: she is leaving for [place] tomorrow for vacation with her family.      Objective   See Exercise, Manual, and Modality Logs for complete treatment.       Assessment/Plan  Discussed that stretching to maintain ROM is most important on her trip.   Progress per Plan of Care when pt returns           Manual Therapy:    14     mins  51665;  Therapeutic Exercise:    34     mins  91829;     Neuromuscular Honey:    0    mins  50464;    Therapeutic Activity:     0     mins  70252;     Gait Trainin     mins  26033;     Ultrasound:     0     mins  75715;    Electrical Stimulation:    0     mins  59297 ( );    Timed Treatment:   48   mins   Total Treatment:     60   mins    Danyelle Stokes, PT, DPT  Physical Therapist  [place] License #[id number]

## 2019-06-24 ENCOUNTER — TREATMENT (OUTPATIENT)
Dept: PHYSICAL THERAPY | Facility: CLINIC | Age: 79
End: 2019-06-24

## 2019-06-24 DIAGNOSIS — M25.661 KNEE STIFFNESS, RIGHT: ICD-10-CM

## 2019-06-24 DIAGNOSIS — R26.9 GAIT DISTURBANCE: ICD-10-CM

## 2019-06-24 DIAGNOSIS — M25.561 RIGHT KNEE PAIN, UNSPECIFIED CHRONICITY: ICD-10-CM

## 2019-06-24 DIAGNOSIS — Z96.651 S/P TOTAL KNEE ARTHROPLASTY, RIGHT: Primary | ICD-10-CM

## 2019-06-24 PROCEDURE — 97110 THERAPEUTIC EXERCISES: CPT | Performed by: PHYSICAL THERAPIST

## 2019-06-24 PROCEDURE — 97140 MANUAL THERAPY 1/> REGIONS: CPT | Performed by: PHYSICAL THERAPIST

## 2019-06-24 NOTE — PROGRESS NOTES
Physical Therapy Daily Progress Note    Visit #19    Subjective     Kristie Franz reports: she went to [facility] with her family, her knee did well. She did a lot of walking and took breaks when she got tired.      Objective   See Exercise, Manual, and Modality Logs for complete treatment.       Assessment/Plan  Added balance exercises, pt tolerated well but will benefit from continued work in this area.  Progress per Plan of Care           Manual Therapy:    10     mins  21612;  Therapeutic Exercise:    30     mins  97299;  direct   Neuromuscular Honey:    0    mins  04131;    Therapeutic Activity:     0     mins  03922;     Gait Trainin     mins  95445;     Ultrasound:     0     mins  46983;    Electrical Stimulation:    0     mins  83023 ( );    Timed Treatment:   40   mins   Total Treatment:     65   mins    Danyelle Stokes PT, DPT  Physical Therapist  [place] License #[id number]

## 2019-06-26 RX ORDER — LEVOTHYROXINE SODIUM 0.05 MG/1
TABLET ORAL
Qty: 90 TABLET | Refills: 2 | Status: SHIPPED | OUTPATIENT
Start: 2019-06-26 | End: 2020-03-23

## 2019-06-28 ENCOUNTER — TREATMENT (OUTPATIENT)
Dept: PHYSICAL THERAPY | Facility: CLINIC | Age: 79
End: 2019-06-28

## 2019-06-28 DIAGNOSIS — M25.561 RIGHT KNEE PAIN, UNSPECIFIED CHRONICITY: ICD-10-CM

## 2019-06-28 DIAGNOSIS — R26.9 GAIT DISTURBANCE: ICD-10-CM

## 2019-06-28 DIAGNOSIS — Z96.651 S/P TOTAL KNEE ARTHROPLASTY, RIGHT: Primary | ICD-10-CM

## 2019-06-28 DIAGNOSIS — M25.661 KNEE STIFFNESS, RIGHT: ICD-10-CM

## 2019-06-28 PROCEDURE — 97140 MANUAL THERAPY 1/> REGIONS: CPT | Performed by: PHYSICAL THERAPIST

## 2019-06-28 PROCEDURE — 97110 THERAPEUTIC EXERCISES: CPT | Performed by: PHYSICAL THERAPIST

## 2019-06-28 NOTE — PROGRESS NOTES
Physical Therapy Daily Progress Note    Visit #20    Subjective     Kristie Franz reports: she did some gardening yesterday, has been walking her dog daily.      Objective       Active Range of Motion     Right Knee   Flexion: 127 degrees   Extension: 0 degrees      See Exercise, Manual, and Modality Logs for complete treatment.       Assessment/Plan  Pt has met most goals, anticipate will meet remaining goals on her own. Discussed long term HEP, will hold chart open 30 days in case of acute exacerbation, otherwise D/C           Manual Therapy:    10     mins  67079;  Therapeutic Exercise:    35     mins  10048;     Neuromuscular Honey:    0    mins  55852;    Therapeutic Activity:     0     mins  58116;     Gait Trainin     mins  93578;     Ultrasound:     0     mins  04766;    Electrical Stimulation:    0     mins  07187 ( );    Timed Treatment:   45   mins   Total Treatment:     55   mins    Danyelle Stokes PT, DPT  Physical Therapist  [place] License #[id number]

## 2019-08-05 RX ORDER — CITALOPRAM 20 MG/1
TABLET ORAL
Qty: 90 TABLET | Refills: 1 | Status: SHIPPED | OUTPATIENT
Start: 2019-08-05 | End: 2020-12-14 | Stop reason: SDUPTHER

## 2019-08-13 ENCOUNTER — OFFICE VISIT (OUTPATIENT)
Dept: INTERNAL MEDICINE | Facility: CLINIC | Age: 79
End: 2019-08-13

## 2019-08-13 VITALS
WEIGHT: 139.8 LBS | SYSTOLIC BLOOD PRESSURE: 134 MMHG | BODY MASS INDEX: 26.39 KG/M2 | DIASTOLIC BLOOD PRESSURE: 74 MMHG | HEIGHT: 61 IN

## 2019-08-13 DIAGNOSIS — E78.49 OTHER HYPERLIPIDEMIA: ICD-10-CM

## 2019-08-13 DIAGNOSIS — I10 ESSENTIAL HYPERTENSION: Primary | ICD-10-CM

## 2019-08-13 DIAGNOSIS — F33.9 MONOPOLAR DEPRESSION (HCC): ICD-10-CM

## 2019-08-13 DIAGNOSIS — F51.01 PRIMARY INSOMNIA: ICD-10-CM

## 2019-08-13 DIAGNOSIS — E03.9 ACQUIRED HYPOTHYROIDISM: ICD-10-CM

## 2019-08-13 PROCEDURE — 99214 OFFICE O/P EST MOD 30 MIN: CPT | Performed by: INTERNAL MEDICINE

## 2019-08-13 NOTE — PROGRESS NOTES
"Subjective   Kristie Franz is a 78 y.o. female here for   Chief Complaint   Patient presents with   • Hyperlipidemia     11 month follow-up   • Hypertension   • Hypothyroidism   .    Vitals:    08/13/19 1543   BP: 134/74   BP Location: Left arm   Patient Position: Sitting   Cuff Size: Adult   Weight: 63.4 kg (139 lb 12.8 oz)   Height: 154.9 cm (61\")       Body mass index is 26.41 kg/m².    Hyperlipidemia   This is a chronic problem. The current episode started more than 1 year ago. The problem is controlled. Exacerbating diseases include hypothyroidism. She has no history of diabetes. Pertinent negatives include no chest pain or shortness of breath.   Hypertension   This is a chronic problem. The current episode started more than 1 year ago. The problem is unchanged. Pertinent negatives include no chest pain, palpitations or shortness of breath.   Hypothyroidism   This is a chronic problem. The current episode started more than 1 year ago. The problem occurs constantly. The problem has been unchanged. Associated symptoms include fatigue. Pertinent negatives include no chest pain, chills, coughing or fever.        The following portions of the patient's history were reviewed and updated as appropriate: allergies, current medications, past social history and problem list.    Review of Systems   Constitutional: Positive for fatigue. Negative for chills and fever.   Respiratory: Negative for cough, shortness of breath and wheezing.    Cardiovascular: Negative for chest pain, palpitations and leg swelling.   Psychiatric/Behavioral: Positive for dysphoric mood (still grieving 's death) and sleep disturbance. The patient is nervous/anxious.        Objective   Physical Exam   Constitutional: She appears well-developed and well-nourished. No distress.   Cardiovascular: Normal rate, regular rhythm and normal heart sounds.   Pulmonary/Chest: No respiratory distress. She has no wheezes. She has no rales. She exhibits no " tenderness.   Musculoskeletal: She exhibits no edema.   Psychiatric: She has a normal mood and affect. Her behavior is normal.   Nursing note and vitals reviewed.      Assessment/Plan   Diagnoses and all orders for this visit:    Essential hypertension  Comments:  controlled - call if bp over 140/90  Orders:  -     Comprehensive Metabolic Panel; Future  -     CBC Auto Differential; Future  -     Urinalysis With Microscopic If Indicated (No Culture) - Urine, Clean Catch; Future    Other hyperlipidemia  Comments:  need diet/ex  Orders:  -     Comprehensive Metabolic Panel; Future  -     CBC Auto Differential; Future  -     Lipid Panel; Future    Acquired hypothyroidism  Comments:  need rechk in next 1-3mos  Orders:  -     TSH Rfx On Abnormal To Free T4; Future    Primary insomnia  Comments:  trial of citalopram at night - call if no better    Monopolar depression (CMS/HCC)  Comments:  trial of incr celexa from 20mg to 30mg daily - call if no better       Need labs in 1-3 mos - keep appt in 3 mos.

## 2019-10-18 ENCOUNTER — TELEPHONE (OUTPATIENT)
Dept: INTERNAL MEDICINE | Facility: CLINIC | Age: 79
End: 2019-10-18

## 2019-10-18 ENCOUNTER — LAB (OUTPATIENT)
Dept: INTERNAL MEDICINE | Facility: CLINIC | Age: 79
End: 2019-10-18

## 2019-10-18 DIAGNOSIS — I10 ESSENTIAL HYPERTENSION: ICD-10-CM

## 2019-10-18 DIAGNOSIS — E78.49 OTHER HYPERLIPIDEMIA: ICD-10-CM

## 2019-10-18 DIAGNOSIS — E03.9 ACQUIRED HYPOTHYROIDISM: ICD-10-CM

## 2019-10-18 LAB
ALBUMIN SERPL-MCNC: 4 G/DL (ref 3.5–5.2)
ALBUMIN/GLOB SERPL: 1.6 G/DL
ALP SERPL-CCNC: 80 U/L (ref 39–117)
ALT SERPL W P-5'-P-CCNC: 8 U/L (ref 1–33)
ANION GAP SERPL CALCULATED.3IONS-SCNC: 12 MMOL/L (ref 5–15)
AST SERPL-CCNC: 13 U/L (ref 1–32)
BACTERIA UR QL AUTO: ABNORMAL /HPF
BASOPHILS # BLD AUTO: 0.05 10*3/MM3 (ref 0–0.2)
BASOPHILS NFR BLD AUTO: 1.2 % (ref 0–1.5)
BILIRUB SERPL-MCNC: 0.7 MG/DL (ref 0.2–1.2)
BILIRUB UR QL STRIP: NEGATIVE
BUN BLD-MCNC: 17 MG/DL (ref 8–23)
BUN/CREAT SERPL: 20.5 (ref 7–25)
CALCIUM SPEC-SCNC: 9.4 MG/DL (ref 8.6–10.5)
CHLORIDE SERPL-SCNC: 99 MMOL/L (ref 98–107)
CHOLEST SERPL-MCNC: 220 MG/DL (ref 0–200)
CLARITY UR: CLEAR
CO2 SERPL-SCNC: 28 MMOL/L (ref 22–29)
COLOR UR: YELLOW
CREAT BLD-MCNC: 0.83 MG/DL (ref 0.57–1)
DEPRECATED RDW RBC AUTO: 44.4 FL (ref 37–54)
EOSINOPHIL # BLD AUTO: 0.19 10*3/MM3 (ref 0–0.4)
EOSINOPHIL NFR BLD AUTO: 4.4 % (ref 0.3–6.2)
ERYTHROCYTE [DISTWIDTH] IN BLOOD BY AUTOMATED COUNT: 13.3 % (ref 12.3–15.4)
GFR SERPL CREATININE-BSD FRML MDRD: 66 ML/MIN/1.73
GLOBULIN UR ELPH-MCNC: 2.5 GM/DL
GLUCOSE BLD-MCNC: 87 MG/DL (ref 65–99)
GLUCOSE UR STRIP-MCNC: NEGATIVE MG/DL
HCT VFR BLD AUTO: 38.7 % (ref 34–46.6)
HDLC SERPL-MCNC: 82 MG/DL (ref 40–60)
HGB BLD-MCNC: 12.8 G/DL (ref 12–15.9)
HGB UR QL STRIP.AUTO: ABNORMAL
HYALINE CASTS UR QL AUTO: ABNORMAL /LPF
KETONES UR QL STRIP: NEGATIVE
LDLC SERPL CALC-MCNC: 123 MG/DL (ref 0–100)
LDLC/HDLC SERPL: 1.5 {RATIO}
LEUKOCYTE ESTERASE UR QL STRIP.AUTO: ABNORMAL
LYMPHOCYTES # BLD AUTO: 1.59 10*3/MM3 (ref 0.7–3.1)
LYMPHOCYTES NFR BLD AUTO: 36.9 % (ref 19.6–45.3)
MCH RBC QN AUTO: 30.8 PG (ref 26.6–33)
MCHC RBC AUTO-ENTMCNC: 33.1 G/DL (ref 31.5–35.7)
MCV RBC AUTO: 93.3 FL (ref 79–97)
MONOCYTES # BLD AUTO: 0.41 10*3/MM3 (ref 0.1–0.9)
MONOCYTES NFR BLD AUTO: 9.5 % (ref 5–12)
MUCOUS THREADS URNS QL MICRO: ABNORMAL /HPF
NEUTROPHILS # BLD AUTO: 2.07 10*3/MM3 (ref 1.7–7)
NEUTROPHILS NFR BLD AUTO: 48 % (ref 42.7–76)
NITRITE UR QL STRIP: NEGATIVE
PH UR STRIP.AUTO: 5.5 [PH] (ref 5–8)
PLATELET # BLD AUTO: 225 10*3/MM3 (ref 140–450)
PMV BLD AUTO: 12.8 FL (ref 6–12)
POTASSIUM BLD-SCNC: 3.9 MMOL/L (ref 3.5–5.2)
PROT SERPL-MCNC: 6.5 G/DL (ref 6–8.5)
PROT UR QL STRIP: NEGATIVE
RBC # BLD AUTO: 4.15 10*6/MM3 (ref 3.77–5.28)
RBC # UR: ABNORMAL /HPF
REF LAB TEST METHOD: ABNORMAL
SODIUM BLD-SCNC: 139 MMOL/L (ref 136–145)
SP GR UR STRIP: 1.02 (ref 1–1.03)
SQUAMOUS #/AREA URNS HPF: ABNORMAL /HPF
TRIGL SERPL-MCNC: 74 MG/DL (ref 0–150)
UROBILINOGEN UR QL STRIP: ABNORMAL
VLDLC SERPL-MCNC: 14.8 MG/DL (ref 5–40)
WBC NRBC COR # BLD: 4.31 10*3/MM3 (ref 3.4–10.8)
WBC UR QL AUTO: ABNORMAL /HPF

## 2019-10-18 PROCEDURE — 81001 URINALYSIS AUTO W/SCOPE: CPT | Performed by: INTERNAL MEDICINE

## 2019-10-18 PROCEDURE — 80061 LIPID PANEL: CPT | Performed by: INTERNAL MEDICINE

## 2019-10-18 PROCEDURE — 80053 COMPREHEN METABOLIC PANEL: CPT | Performed by: INTERNAL MEDICINE

## 2019-10-18 PROCEDURE — 85025 COMPLETE CBC W/AUTO DIFF WBC: CPT | Performed by: INTERNAL MEDICINE

## 2019-10-18 NOTE — TELEPHONE ENCOUNTER
Ms. Franz states she doesn't have any symptoms. Renato states she will not send in for a culture.    Thank you

## 2019-10-18 NOTE — TELEPHONE ENCOUNTER
----- Message from Marisol Broussard MD sent at 10/18/2019 12:54 PM EDT -----  Regarding: RE: urine C&S?  pls call her - does not look like UTI, but ask Renato to send culture if she has burning,etc  ----- Message -----  From: Renato Mccarthy, MLT  Sent: 10/18/2019  12:05 PM  To: Marisol Broussard MD  Subject: urine C&S?                                       Do you want to add a urine culture to this patient?    Thanks, Renato

## 2019-10-19 LAB — TSH SERPL-ACNC: 1.71 UIU/ML (ref 0.27–4.2)

## 2019-11-04 ENCOUNTER — OFFICE VISIT (OUTPATIENT)
Dept: INTERNAL MEDICINE | Facility: CLINIC | Age: 79
End: 2019-11-04

## 2019-11-04 ENCOUNTER — CLINICAL SUPPORT (OUTPATIENT)
Dept: INTERNAL MEDICINE | Facility: CLINIC | Age: 79
End: 2019-11-04

## 2019-11-04 ENCOUNTER — APPOINTMENT (OUTPATIENT)
Dept: WOMENS IMAGING | Facility: HOSPITAL | Age: 79
End: 2019-11-04

## 2019-11-04 VITALS
TEMPERATURE: 98.5 F | BODY MASS INDEX: 26.02 KG/M2 | DIASTOLIC BLOOD PRESSURE: 82 MMHG | OXYGEN SATURATION: 95 % | WEIGHT: 141.4 LBS | SYSTOLIC BLOOD PRESSURE: 122 MMHG | HEART RATE: 60 BPM | RESPIRATION RATE: 15 BRPM | HEIGHT: 62 IN

## 2019-11-04 DIAGNOSIS — E03.9 ACQUIRED HYPOTHYROIDISM: ICD-10-CM

## 2019-11-04 DIAGNOSIS — Z78.0 POST-MENOPAUSAL: ICD-10-CM

## 2019-11-04 DIAGNOSIS — I10 ESSENTIAL HYPERTENSION: Primary | ICD-10-CM

## 2019-11-04 DIAGNOSIS — E78.49 OTHER HYPERLIPIDEMIA: ICD-10-CM

## 2019-11-04 DIAGNOSIS — Z12.31 ENCOUNTER FOR SCREENING MAMMOGRAM FOR BREAST CANCER: ICD-10-CM

## 2019-11-04 DIAGNOSIS — R53.82 CHRONIC FATIGUE: ICD-10-CM

## 2019-11-04 DIAGNOSIS — Z12.31 ENCOUNTER FOR SCREENING MAMMOGRAM FOR BREAST CANCER: Primary | ICD-10-CM

## 2019-11-04 DIAGNOSIS — R32: ICD-10-CM

## 2019-11-04 DIAGNOSIS — Z00.00 MEDICARE ANNUAL WELLNESS VISIT, SUBSEQUENT: ICD-10-CM

## 2019-11-04 PROBLEM — M25.561 CHRONIC PAIN OF RIGHT KNEE: Status: RESOLVED | Noted: 2017-04-10 | Resolved: 2019-11-04

## 2019-11-04 PROBLEM — M25.562 CHRONIC PAIN OF LEFT KNEE: Status: RESOLVED | Noted: 2017-11-01 | Resolved: 2019-11-04

## 2019-11-04 PROBLEM — G89.29 CHRONIC PAIN OF RIGHT KNEE: Status: RESOLVED | Noted: 2017-04-10 | Resolved: 2019-11-04

## 2019-11-04 PROBLEM — G89.29 CHRONIC PAIN OF LEFT KNEE: Status: RESOLVED | Noted: 2017-11-01 | Resolved: 2019-11-04

## 2019-11-04 PROBLEM — M17.11 PRIMARY OSTEOARTHRITIS OF RIGHT KNEE: Status: RESOLVED | Noted: 2019-02-07 | Resolved: 2019-11-04

## 2019-11-04 PROCEDURE — 77067 SCR MAMMO BI INCL CAD: CPT | Performed by: RADIOLOGY

## 2019-11-04 PROCEDURE — 99213 OFFICE O/P EST LOW 20 MIN: CPT | Performed by: INTERNAL MEDICINE

## 2019-11-04 PROCEDURE — G0439 PPPS, SUBSEQ VISIT: HCPCS | Performed by: INTERNAL MEDICINE

## 2019-11-04 PROCEDURE — 77067 SCR MAMMO BI INCL CAD: CPT | Performed by: INTERNAL MEDICINE

## 2019-11-04 PROCEDURE — 77080 DXA BONE DENSITY AXIAL: CPT | Performed by: INTERNAL MEDICINE

## 2019-11-04 NOTE — PROGRESS NOTES
"Subjective   Kristie Franz is a 78 y.o. female here for   Chief Complaint   Patient presents with   • Medicare Wellness-subsequent   • Hyperlipidemia   • Hypertension   • Hypothyroidism   .    Vitals:    11/04/19 1423   BP: 122/82   BP Location: Right arm   Patient Position: Sitting   Cuff Size: Adult   Pulse: 60   Resp: 15   Temp: 98.5 °F (36.9 °C)   TempSrc: Temporal   SpO2: 95%   Weight: 64.1 kg (141 lb 6.4 oz)   Height: 156.2 cm (61.5\")       Body mass index is 26.28 kg/m².    Hyperlipidemia   This is a chronic problem. The current episode started more than 1 year ago. The problem is controlled. Exacerbating diseases include hypothyroidism. She has no history of diabetes. Pertinent negatives include no chest pain or shortness of breath.   Hypertension   This is a chronic problem. The current episode started more than 1 year ago. The problem is unchanged. The problem is controlled. Pertinent negatives include no chest pain, palpitations or shortness of breath.   Hypothyroidism   This is a chronic problem. The current episode started more than 1 year ago. The problem occurs constantly. The problem has been unchanged. Associated symptoms include fatigue (mild/occ). Pertinent negatives include no chest pain, chills, coughing or fever.        The following portions of the patient's history were reviewed and updated as appropriate: allergies, current medications, past social history and problem list.    Review of Systems   Constitutional: Positive for fatigue (mild/occ). Negative for chills and fever.   Respiratory: Negative for cough, shortness of breath and wheezing.    Cardiovascular: Negative for chest pain, palpitations and leg swelling.   Psychiatric/Behavioral: Negative for dysphoric mood and sleep disturbance. The patient is not nervous/anxious.        Objective   Physical Exam   Constitutional: She appears well-developed and well-nourished. No distress.   Cardiovascular: Normal rate, regular rhythm and normal " heart sounds.   Pulmonary/Chest: No respiratory distress. She has no wheezes. She has no rales. She exhibits no tenderness. Right breast exhibits no inverted nipple, no mass, no nipple discharge, no skin change and no tenderness. Left breast exhibits no inverted nipple, no mass, no nipple discharge, no skin change and no tenderness.   Musculoskeletal: She exhibits no edema.   Psychiatric: She has a normal mood and affect. Her behavior is normal.   Nursing note and vitals reviewed.      Assessment/Plan   Diagnoses and all orders for this visit:    Essential hypertension  Comments:  controlled -call if bp over 140/90    Other hyperlipidemia  Comments:  continue diet/ex    Acquired hypothyroidism  Comments:  continue synthroid daily    Chronic fatigue  Comments:  mild & occ - call if worse    Mild incontinence  Comments:  need kegel exercises multiple x daily - call for meds if worse    Medicare annual wellness visit, subsequent       Wellness today.   Need daily strengthening & balance exercises (shown today).   Need screening for AAA & carotid disease.  Information given today.

## 2019-11-04 NOTE — PROGRESS NOTES
The ABCs of the Annual Wellness Visit  Subsequent Medicare Wellness Visit    Chief Complaint   Patient presents with   • Medicare Wellness-subsequent   • Hyperlipidemia   • Hypertension   • Hypothyroidism       Subjective   History of Present Illness:  Kristie Franz is a 78 y.o. female who presents for a Subsequent Medicare Wellness Visit.    HEALTH RISK ASSESSMENT    Recent Hospitalizations:  No hospitalization(s) within the last year.    Current Medical Providers:  Patient Care Team:  Marisol Broussard MD as PCP - General (Internal Medicine)  Marisol Broussard MD as PCP - Claims Attributed  Kristie Wallace MD (Dermatology)  Gennaro Yeung MD as Consulting Physician (Orthopedic Surgery)  Edu Bell MD as Consulting Physician (Orthopedic Surgery)  Dee Dee Bell MD as Consulting Physician (Ophthalmology)    Smoking Status:  Social History     Tobacco Use   Smoking Status Never Smoker   Smokeless Tobacco Never Used       Alcohol Consumption:  Social History     Substance and Sexual Activity   Alcohol Use Yes    Comment: Seldom; socially       Depression Screen:   PHQ-2/PHQ-9 Depression Screening 11/4/2019   Little interest or pleasure in doing things 0   Feeling down, depressed, or hopeless 0   Trouble falling or staying asleep, or sleeping too much 0   Feeling tired or having little energy 0   Poor appetite or overeating 0   Feeling bad about yourself - or that you are a failure or have let yourself or your family down 0   Trouble concentrating on things, such as reading the newspaper or watching television 0   Moving or speaking so slowly that other people could have noticed. Or the opposite - being so fidgety or restless that you have been moving around a lot more than usual 0   Thoughts that you would be better off dead, or of hurting yourself in some way 0   Total Score 0   If you checked off any problems, how difficult have these problems made it for you to do your work, take  care of things at home, or get along with other people? -       Fall Risk Screen:  PARVIZ Fall Risk Assessment has not been completed.    Health Habits and Functional and Cognitive Screening:  Functional & Cognitive Status 11/4/2019   Do you have difficulty preparing food and eating? No   Do you have difficulty bathing yourself, getting dressed or grooming yourself? No   Do you have difficulty using the toilet? No   Do you have difficulty moving around from place to place? No   Do you have trouble with steps or getting out of a bed or a chair? No   Current Diet Well Balanced Diet   Dental Exam Up to date   Eye Exam Up to date   Exercise (times per week) 3 times per week   Current Exercise Activities Include Cardiovasular Workout on Exercise Equipment   Do you need help using the phone?  No   Are you deaf or do you have serious difficulty hearing?  No   Do you need help with transportation? No   Do you need help shopping? No   Do you need help preparing meals?  No   Do you need help with housework?  No   Do you need help with laundry? No   Do you need help taking your medications? No   Do you need help managing money? No   Do you ever drive or ride in a car without wearing a seat belt? No   Have you felt unusual stress, anger or loneliness in the last month? No   Who do you live with? Alone   If you need help, do you have trouble finding someone available to you? No   Have you been bothered in the last four weeks by sexual problems? (No Data)   Do you have difficulty concentrating, remembering or making decisions? No         Does the patient have evidence of cognitive impairment? No    Asprin use counseling:Does not need ASA (and currently is not on it)    Age-appropriate Screening Schedule:  Refer to the list below for future screening recommendations based on patient's age, sex and/or medical conditions. Orders for these recommended tests are listed in the plan section. The patient has been provided with a written  plan.    Health Maintenance   Topic Date Due   • LIPID PANEL  10/18/2020   • MAMMOGRAM  11/04/2021   • DXA SCAN  11/04/2021   • TDAP/TD VACCINES (2 - Td) 10/04/2025   • INFLUENZA VACCINE  Completed   • PNEUMOCOCCAL VACCINES (65+ LOW/MEDIUM RISK)  Completed   • ZOSTER VACCINE  Completed          The following portions of the patient's history were reviewed and updated as appropriate: allergies, current medications, past family history, past medical history, past social history, past surgical history and problem list.    Outpatient Medications Prior to Visit   Medication Sig Dispense Refill   • fexofenadine (ALLEGRA) 180 MG tablet Take 180 mg by mouth As Needed.     • levothyroxine (SYNTHROID, LEVOTHROID) 50 MCG tablet TAKE ONE TABLET BY MOUTH DAILY 90 tablet 2   • TRAVATAN Z 0.004 % solution ophthalmic solution Administer 1 drop to both eyes Every Night.     • triamterene-hydrochlorothiazide (MAXZIDE-25) 37.5-25 MG per tablet Take 1 tablet by mouth Every Other Day.     • citalopram (CeleXA) 20 MG tablet TAKE ONE TABLET BY MOUTH DAILY 90 tablet 1     No facility-administered medications prior to visit.        Patient Active Problem List   Diagnosis   • Hypertension   • Hyperlipidemia   • Mood disorder (CMS/HCC)   • Allergic rhinitis   • Osteopenia   • Insomnia   • Chronic pain of right knee   • Nocturia   • Chronic fatigue   • Chronic pain of left knee   • Other microscopic hematuria   • Acquired hypothyroidism   • Hypothyroidism   • Mild incontinence   • Primary osteoarthritis of right knee   • OA (osteoarthritis) of knee       Advanced Care Planning:  Patient does not have an advance directive - not interested in additional information    Review of Systems    Compared to one year ago, the patient feels her physical health is better.  Compared to one year ago, the patient feels her mental health is better.    Reviewed chart for potential of high risk medication in the elderly: not applicable  Reviewed chart for  "potential of harmful drug interactions in the elderly:not applicable    Objective         Vitals:    11/04/19 1423   BP: 110/80   BP Location: Right arm   Patient Position: Sitting   Cuff Size: Adult   Pulse: 60   Resp: 15   Temp: 98.5 °F (36.9 °C)   TempSrc: Temporal   SpO2: 95%   Weight: 64.1 kg (141 lb 6.4 oz)   Height: 156.2 cm (61.5\")  Comment: Updated Height   PainSc: 0-No pain       Body mass index is 26.28 kg/m².  Discussed the patient's BMI with her. The BMI is above average; BMI management plan is completed.    Physical Exam    Lab Results   Component Value Date    TRIG 74 10/18/2019    HDL 82 (H) 10/18/2019     (H) 10/18/2019    VLDL 14.8 10/18/2019        Assessment/Plan   Medicare Risks and Personalized Health Plan  CMS Preventative Services Quick Reference  Advance Directive Discussion  Fall Risk  Obesity/Overweight   Osteoprorosis Risk  Urinary Incontinence    The above risks/problems have been discussed with the patient.  Pertinent information has been shared with the patient in the After Visit Summary.  Follow up plans and orders are seen below in the Assessment/Plan Section.    There are no diagnoses linked to this encounter.  Follow Up:  No Follow-up on file.     An After Visit Summary and PPPS were given to the patient.             "

## 2019-11-04 NOTE — PATIENT INSTRUCTIONS
Medicare Wellness  Personal Prevention Plan of Service     Date of Office Visit:  2019  Encounter Provider:  Marisol Broussard MD  Place of Service:  [facility]  Patient Name: Kristie Franz  :  1940    As part of the Medicare Wellness portion of your visit today, we are providing you with this personalized preventive plan of services (PPPS). This plan is based upon recommendations of the [place] Preventive Services Task Force (USPSTF) and the Advisory Committee on Immunization Practices (ACIP).    This lists the preventive care services that should be considered, and provides dates of when you are due. Items listed as completed are up-to-date and do not require any further intervention.    Health Maintenance   Topic Date Due   • MEDICARE ANNUAL WELLNESS  2019   • LIPID PANEL  10/18/2020   • MAMMOGRAM  2021   • DXA SCAN  2021   • COLOGUARD  2022   • TDAP/TD VACCINES (2 - Td) 10/04/2025   • INFLUENZA VACCINE  Completed   • PNEUMOCOCCAL VACCINES (65+ LOW/MEDIUM RISK)  Completed   • ZOSTER VACCINE  Completed       No orders of the defined types were placed in this encounter.      No Follow-up on file.

## 2019-11-06 ENCOUNTER — DOCUMENTATION (OUTPATIENT)
Dept: INTERNAL MEDICINE | Facility: CLINIC | Age: 79
End: 2019-11-06

## 2019-11-06 DIAGNOSIS — N64.89 BREAST ASYMMETRY: Primary | ICD-10-CM

## 2019-11-06 NOTE — PROGRESS NOTES
Scheduled at [facility] on 11-27-19 at 9:30AM. Pt informed.    Left message for pt to contact [facility] regarding Mammography results.  GIRMA Hillman)(M),ARRT

## 2019-11-11 ENCOUNTER — TRANSCRIBE ORDERS (OUTPATIENT)
Dept: ADMINISTRATIVE | Facility: HOSPITAL | Age: 79
End: 2019-11-11

## 2019-11-11 DIAGNOSIS — Z13.6 ENCOUNTER FOR SCREENING FOR VASCULAR DISEASE: Primary | ICD-10-CM

## 2019-11-25 ENCOUNTER — HOSPITAL ENCOUNTER (OUTPATIENT)
Dept: CARDIOLOGY | Facility: HOSPITAL | Age: 79
Discharge: HOME | End: 2019-11-25
Admitting: INTERNAL MEDICINE

## 2019-11-25 VITALS
BODY MASS INDEX: 25.4 KG/M2 | HEART RATE: 58 BPM | DIASTOLIC BLOOD PRESSURE: 72 MMHG | HEIGHT: 62 IN | SYSTOLIC BLOOD PRESSURE: 134 MMHG | WEIGHT: 138 LBS

## 2019-11-25 DIAGNOSIS — Z13.6 ENCOUNTER FOR SCREENING FOR VASCULAR DISEASE: ICD-10-CM

## 2019-11-25 LAB
BH CV VAS BP LEFT ARM: NORMAL MMHG
BH CV VAS BP RIGHT ARM: NORMAL MMHG
BH CV XLRA MEAS LEFT ICA/CCA RATIO: 1.29
BH CV XLRA MEAS LEFT MID CCA PSV: NORMAL CM/SEC
BH CV XLRA MEAS LEFT MID ICA PSV: NORMAL CM/SEC
BH CV XLRA MEAS LEFT PROX ECA PSV: NORMAL CM/SEC
BH CV XLRA MEAS RIGHT ICA/CCA RATIO: 1.02
BH CV XLRA MEAS RIGHT MID CCA PSV: NORMAL CM/SEC
BH CV XLRA MEAS RIGHT MID ICA PSV: NORMAL CM/SEC
BH CV XLRA MEAS RIGHT PROX ECA PSV: NORMAL CM/SEC

## 2019-11-25 PROCEDURE — 93799 UNLISTED CV SVC/PROCEDURE: CPT

## 2019-11-27 ENCOUNTER — APPOINTMENT (OUTPATIENT)
Dept: WOMENS IMAGING | Facility: HOSPITAL | Age: 79
End: 2019-11-27

## 2019-11-27 PROCEDURE — G0279 TOMOSYNTHESIS, MAMMO: HCPCS | Performed by: RADIOLOGY

## 2019-11-27 PROCEDURE — 76641 ULTRASOUND BREAST COMPLETE: CPT | Performed by: RADIOLOGY

## 2019-11-27 PROCEDURE — 77065 DX MAMMO INCL CAD UNI: CPT | Performed by: RADIOLOGY

## 2020-02-17 RX ORDER — TRIAMTERENE AND HYDROCHLOROTHIAZIDE 37.5; 25 MG/1; MG/1
TABLET ORAL
Qty: 90 TABLET | Refills: 0 | Status: SHIPPED | OUTPATIENT
Start: 2020-02-17 | End: 2020-08-31

## 2020-03-23 RX ORDER — LEVOTHYROXINE SODIUM 0.05 MG/1
TABLET ORAL
Qty: 90 TABLET | Refills: 1 | Status: SHIPPED | OUTPATIENT
Start: 2020-03-23 | End: 2020-09-21

## 2020-04-09 ENCOUNTER — TELEMEDICINE (OUTPATIENT)
Dept: INTERNAL MEDICINE | Facility: CLINIC | Age: 80
End: 2020-04-09

## 2020-04-09 ENCOUNTER — TELEPHONE (OUTPATIENT)
Dept: INTERNAL MEDICINE | Facility: CLINIC | Age: 80
End: 2020-04-09

## 2020-04-09 VITALS — SYSTOLIC BLOOD PRESSURE: 114 MMHG | DIASTOLIC BLOOD PRESSURE: 83 MMHG

## 2020-04-09 DIAGNOSIS — I10 ESSENTIAL HYPERTENSION: ICD-10-CM

## 2020-04-09 DIAGNOSIS — R22.42 LOCALIZED SWELLING OF LEFT FOOT: Primary | ICD-10-CM

## 2020-04-09 DIAGNOSIS — F33.9 MONOPOLAR DEPRESSION (HCC): ICD-10-CM

## 2020-04-09 PROCEDURE — 99214 OFFICE O/P EST MOD 30 MIN: CPT | Performed by: INTERNAL MEDICINE

## 2020-04-09 NOTE — PROGRESS NOTES
Subjective   Kristie Franz is a 79 y.o. female here for   Chief Complaint   Patient presents with   • Foot Pain     left foot pain 3 days ago - no pain now -just swollen   • Leg Swelling   • Hypertension     controlled with diuretic   .    Vitals:    04/09/20 1502   BP: 114/83       There is no height or weight on file to calculate BMI.    Foot Pain   This is a new problem. The problem has been resolved. Associated symptoms include fatigue. Pertinent negatives include no chest pain, chills, coughing or fever.   Leg Swelling   This is a new problem. The current episode started in the past 7 days. The problem occurs constantly. The problem has been unchanged. Associated symptoms include fatigue. Pertinent negatives include no chest pain, chills, coughing or fever.   Hypertension   This is a chronic problem. The current episode started more than 1 year ago. The problem is unchanged. The problem is controlled. Pertinent negatives include no chest pain, palpitations or shortness of breath. Current antihypertension treatment includes diuretics. There are no compliance problems.         The following portions of the patient's history were reviewed and updated as appropriate: allergies, current medications, past social history and problem list.    Review of Systems   Constitutional: Positive for fatigue. Negative for chills and fever.   Respiratory: Negative for cough, shortness of breath and wheezing.    Cardiovascular: Positive for leg swelling (top of left foot -not calf). Negative for chest pain and palpitations.   Psychiatric/Behavioral: Positive for dysphoric mood (slightly down b/c pandemic). Negative for sleep disturbance. The patient is not nervous/anxious.      No redness or pain, no swelling of calf.    Objective   Physical Exam   Constitutional: She appears well-developed and well-nourished. No distress.   Pulmonary/Chest: No respiratory distress.   Musculoskeletal: She exhibits edema (slight edema only on top  "of left foot).   Neurological: She is alert. No cranial nerve deficit.   Skin: Skin is warm and dry. No rash noted. She is not diaphoretic. No erythema.   Psychiatric: She has a normal mood and affect. Her behavior is normal. Judgment and thought content normal.       Assessment/Plan   Diagnoses and all orders for this visit:    Localized swelling of left foot  Comments:  mild swelling only on top of foot with no redness -likely from mild twisting injury -call if no better or worse    Essential hypertension  Comments:  controlled    Monopolar depression (CMS/HCC)  Comments:  ok to increase celexa to 15mg/d during pandemic - also talked about keeping in touch with family (they bring groceries,etc)     Video visit changed to face time b/c technical difficulty.    20\" spent with medical decision making.       "

## 2020-04-09 NOTE — PATIENT INSTRUCTIONS
Left foot/ankle swelling (mild) after likely twisting foot on mulch- need elevation & cool compresses -ok to use low dose ibuprofen & tylenol if needed. No sign of DVT now, but need to keep legs moving to prevent bld clots.   Hypertension is controlled with diuretic every other day - call if bp over 140/90.   For the stress of pandemic, you may increase citalopram from 10cmg to 15 mg daily - call if problems.

## 2020-04-09 NOTE — TELEPHONE ENCOUNTER
Call made to patient, she states that her left foot has been swollen since Monday. She reports taking Tylenol. She reports no pain since Monday.    Patient was given an appointment for today w/ Dr. Broussard.

## 2020-04-09 NOTE — TELEPHONE ENCOUNTER
Patient called in stating her left has been swollen since Monday. Patient would like to speak with the nurse to see what could be done.    Please call back to advise [phone]

## 2020-06-09 ENCOUNTER — OFFICE VISIT (OUTPATIENT)
Dept: ORTHOPEDIC SURGERY | Facility: CLINIC | Age: 80
End: 2020-06-09

## 2020-06-09 VITALS — HEIGHT: 61 IN | WEIGHT: 139 LBS | TEMPERATURE: 98.6 F | BODY MASS INDEX: 26.24 KG/M2

## 2020-06-09 DIAGNOSIS — Z96.651 STATUS POST TOTAL RIGHT KNEE REPLACEMENT: Primary | ICD-10-CM

## 2020-06-09 PROCEDURE — 73562 X-RAY EXAM OF KNEE 3: CPT | Performed by: ORTHOPAEDIC SURGERY

## 2020-06-09 PROCEDURE — 99212 OFFICE O/P EST SF 10 MIN: CPT | Performed by: ORTHOPAEDIC SURGERY

## 2020-06-09 RX ORDER — TIMOLOL MALEATE 5 MG/ML
SOLUTION/ DROPS OPHTHALMIC
COMMUNITY
Start: 2020-06-03 | End: 2021-11-23

## 2020-06-09 RX ORDER — LIFITEGRAST 50 MG/ML
SOLUTION/ DROPS OPHTHALMIC
COMMUNITY
Start: 2020-06-04 | End: 2021-11-23

## 2020-06-09 NOTE — PROGRESS NOTES
"Kristie Franz : 1940 MRN: [id number] DATE: 2020    DIAGNOSIS: Annual follow up right total knee      SUBJECTIVE:Patient returns today for  1 year follow up of right total knee replacement. Patient reports doing well with no unusual complaints. Denies any limitations due to the knee.    OBJECTIVE:    Temp 98.6 °F (37 °C)   Ht 154.9 cm (61\")   Wt 63 kg (139 lb)   LMP  (LMP Unknown)   BMI 26.26 kg/m²   Family History   Problem Relation Age of Onset   • Diabetes Mother    • Hypertension Mother    • Arthritis Mother    • Heart attack Father          age 67   • Heart disease Father         Heart Attack   • Cancer Brother    • Malig Hyperthermia Neg Hx      Past Medical History:   Diagnosis Date   • Allergic Amoxicillin    reaction many years ago   • Allergic rhinitis    • Anemia    • Anxiety     low dosage med   • Arthritis    • Back pain    • Depression Since 's death   • Fatigue    • Hyperlipidemia    • Hypertension    • Hypothyroidism    • Insomnia    • Joint pain    • Migraine    • Mood disorder (CMS/HCC)    • Osteopenia    • Urinary frequency      Past Surgical History:   Procedure Laterality Date   • COLONOSCOPY     • TOTAL KNEE ARTHROPLASTY Right 2019    Procedure: TOTAL KNEE ARTHROPLASTY;  Surgeon: Trevon Hunt MD;  Location: [facility];  Service: Orthopedics   • WISDOM TOOTH EXTRACTION       Social History     Socioeconomic History   • Marital status:      Spouse name: Not on file   • Number of children: Not on file   • Years of education: Not on file   • Highest education level: Not on file   Tobacco Use   • Smoking status: Never Smoker   • Smokeless tobacco: Never Used   Substance and Sexual Activity   • Alcohol use: Yes     Comment: Seldom; socially   • Drug use: No   • Sexual activity: Never   Social History Narrative    Vitamin D=39     Review of Systems: 14 point review of systems performed, all systems negative     Exam:. The incision is well healed. Range of " motion is measured at 0 to 120. The calf is soft and nontender with a negative Homans sign. Alignment is neutral. Good quad strength. There is no evidence of varus/valgus or flexion instability. No effusion. Intact to light touch with palpable distal pulses.     DIAGNOSTIC STUDIES  Xrays: 3 views(AP bilateral knees, lateral right, and sunrise bilateral knees) were ordered and reviewed for evaluation of right knee replacement. They demonstrate a well positioned, well aligned knee replacement without complicating factors noted. In comparison with previous films there has been no change.    ASSESSMENT: Annual follow up right knee replacement.    PLAN:  Continue activities as tolerated    Follow up JOSE Hunt MD  6/9/2020

## 2020-08-05 ENCOUNTER — TELEPHONE (OUTPATIENT)
Dept: INTERNAL MEDICINE | Facility: CLINIC | Age: 80
End: 2020-08-05

## 2020-08-31 RX ORDER — TRIAMTERENE AND HYDROCHLOROTHIAZIDE 37.5; 25 MG/1; MG/1
TABLET ORAL
Qty: 90 TABLET | Refills: 3 | Status: SHIPPED | OUTPATIENT
Start: 2020-08-31 | End: 2021-03-02 | Stop reason: SDUPTHER

## 2020-09-04 ENCOUNTER — PATIENT MESSAGE (OUTPATIENT)
Dept: INTERNAL MEDICINE | Facility: CLINIC | Age: 80
End: 2020-09-04

## 2020-09-04 NOTE — TELEPHONE ENCOUNTER
From: Kristie Franz  To: Marisol Broussard MD  Sent: 9/4/2020 2:41 PM EDT  Subject: Non-Urgent Medical Question    Should I have lab work before my November appointment with Dr. Broussard?

## 2020-09-21 RX ORDER — LEVOTHYROXINE SODIUM 0.05 MG/1
TABLET ORAL
Qty: 90 TABLET | Refills: 0 | Status: SHIPPED | OUTPATIENT
Start: 2020-09-21 | End: 2020-09-22 | Stop reason: SDUPTHER

## 2020-09-22 RX ORDER — LEVOTHYROXINE SODIUM 0.05 MG/1
50 TABLET ORAL DAILY
Qty: 90 TABLET | Refills: 0 | Status: SHIPPED | OUTPATIENT
Start: 2020-09-22 | End: 2020-12-14 | Stop reason: SDUPTHER

## 2020-09-22 NOTE — TELEPHONE ENCOUNTER
Caller: Kristie Franz    Relationship: Self    Best call back number: [phone]    Medication needed:   Requested Prescriptions     Pending Prescriptions Disp Refills   • levothyroxine (SYNTHROID, LEVOTHROID) 50 MCG tablet 90 tablet 0     Sig: Take 1 tablet by mouth Daily.       When do you need the refill by: TODAY    What details did the patient provide when requesting the medication: HAS ONLY ONE PILL LEFT    Does the patient have less than a 3 day supply:  [x] Yes  [] No    What is the patient's preferred pharmacy: [facility] [address] [facility] - [phone] [facility] [phone] FX

## 2020-09-26 ENCOUNTER — FLU SHOT (OUTPATIENT)
Dept: FAMILY MEDICINE CLINIC | Facility: CLINIC | Age: 80
End: 2020-09-26

## 2020-09-26 DIAGNOSIS — Z23 NEED FOR INFLUENZA VACCINATION: ICD-10-CM

## 2020-09-26 PROCEDURE — G0008 ADMIN INFLUENZA VIRUS VAC: HCPCS | Performed by: FAMILY MEDICINE

## 2020-09-26 PROCEDURE — 90694 VACC AIIV4 NO PRSRV 0.5ML IM: CPT | Performed by: FAMILY MEDICINE

## 2020-11-09 ENCOUNTER — APPOINTMENT (OUTPATIENT)
Dept: WOMENS IMAGING | Facility: HOSPITAL | Age: 80
End: 2020-11-09

## 2020-11-09 ENCOUNTER — OFFICE VISIT (OUTPATIENT)
Dept: INTERNAL MEDICINE | Facility: CLINIC | Age: 80
End: 2020-11-09

## 2020-11-09 VITALS
SYSTOLIC BLOOD PRESSURE: 118 MMHG | DIASTOLIC BLOOD PRESSURE: 76 MMHG | TEMPERATURE: 97.3 F | OXYGEN SATURATION: 100 % | WEIGHT: 137 LBS | BODY MASS INDEX: 25.86 KG/M2 | HEART RATE: 65 BPM | HEIGHT: 61 IN

## 2020-11-09 DIAGNOSIS — Z11.59 SCREENING FOR VIRAL DISEASE: ICD-10-CM

## 2020-11-09 DIAGNOSIS — E78.49 OTHER HYPERLIPIDEMIA: ICD-10-CM

## 2020-11-09 DIAGNOSIS — F39 MOOD DISORDER (HCC): ICD-10-CM

## 2020-11-09 DIAGNOSIS — E03.9 ACQUIRED HYPOTHYROIDISM: ICD-10-CM

## 2020-11-09 DIAGNOSIS — I10 ESSENTIAL HYPERTENSION: ICD-10-CM

## 2020-11-09 DIAGNOSIS — Z00.00 MEDICARE ANNUAL WELLNESS VISIT, SUBSEQUENT: Primary | ICD-10-CM

## 2020-11-09 DIAGNOSIS — R32: ICD-10-CM

## 2020-11-09 DIAGNOSIS — Z12.31 ENCOUNTER FOR SCREENING MAMMOGRAM FOR BREAST CANCER: Primary | ICD-10-CM

## 2020-11-09 PROCEDURE — G0439 PPPS, SUBSEQ VISIT: HCPCS | Performed by: INTERNAL MEDICINE

## 2020-11-09 PROCEDURE — 77067 SCR MAMMO BI INCL CAD: CPT | Performed by: RADIOLOGY

## 2020-11-09 RX ORDER — MOXIFLOXACIN 5 MG/ML
SOLUTION/ DROPS OPHTHALMIC
COMMUNITY
Start: 2020-09-17 | End: 2021-11-23

## 2020-11-09 NOTE — PROGRESS NOTES
The ABCs of the Annual Wellness Visit  Subsequent Medicare Wellness Visit    Chief Complaint   Patient presents with   • Medicare Wellness-subsequent       Subjective   History of Present Illness:  Kristie Franz is a 79 y.o. female who presents for a Subsequent Medicare Wellness Visit.    HEALTH RISK ASSESSMENT    Recent Hospitalizations:  No hospitalization(s) within the last year.    Current Medical Providers:  Patient Care Team:  Marisol Broussard MD as PCP - General (Internal Medicine)  Kristie Wallace MD (Dermatology)  Gennaro Yeung MD as Consulting Physician (Orthopedic Surgery)  Edu Bell MD as Consulting Physician (Orthopedic Surgery)  Dee Dee Bell MD as Consulting Physician (Ophthalmology)  Trevon Hunt MD as Surgeon (Orthopedic Surgery)  Stella Argueta DO as Consulting Physician (Ophthalmology)    Smoking Status:  Social History     Tobacco Use   Smoking Status Never Smoker   Smokeless Tobacco Never Used       Alcohol Consumption:  Social History     Substance and Sexual Activity   Alcohol Use Yes    Comment: Seldom; socially       Depression Screen:   PHQ-2/PHQ-9 Depression Screening 11/9/2020   Little interest or pleasure in doing things 0   Feeling down, depressed, or hopeless 1   Trouble falling or staying asleep, or sleeping too much 0   Feeling tired or having little energy 1   Poor appetite or overeating 0   Feeling bad about yourself - or that you are a failure or have let yourself or your family down 0   Trouble concentrating on things, such as reading the newspaper or watching television 0   Moving or speaking so slowly that other people could have noticed. Or the opposite - being so fidgety or restless that you have been moving around a lot more than usual 0   Thoughts that you would be better off dead, or of hurting yourself in some way 0   Total Score 2   If you checked off any problems, how difficult have these problems made it for you to do your  work, take care of things at home, or get along with other people? Somewhat difficult       Fall Risk Screen:  PARVIZ Fall Risk Assessment was completed, and patient is at LOW risk for falls.Assessment completed on:11/9/2020    Health Habits and Functional and Cognitive Screening:  Functional & Cognitive Status 11/9/2020   Do you have difficulty preparing food and eating? No   Do you have difficulty bathing yourself, getting dressed or grooming yourself? No   Do you have difficulty using the toilet? No   Do you have difficulty moving around from place to place? No   Do you have trouble with steps or getting out of a bed or a chair? No   Current Diet Well Balanced Diet   Dental Exam Up to date   Eye Exam Up to date   Exercise (times per week) 0 times per week   Current Exercises Include No Regular Exercise        Exercise Comment due to covid   Current Exercise Activities Include -   Do you need help using the phone?  No   Are you deaf or do you have serious difficulty hearing?  No   Do you need help with transportation? No   Do you need help shopping? No   Do you need help preparing meals?  No   Do you need help with housework?  No   Do you need help with laundry? No   Do you need help taking your medications? No   Do you need help managing money? No   Do you ever drive or ride in a car without wearing a seat belt? No   Have you felt unusual stress, anger or loneliness in the last month? No   Who do you live with? Alone   If you need help, do you have trouble finding someone available to you? No   Have you been bothered in the last four weeks by sexual problems? No   Do you have difficulty concentrating, remembering or making decisions? No         Does the patient have evidence of cognitive impairment? No    Asprin use counseling:Does not need ASA (and currently is not on it)    Age-appropriate Screening Schedule:  Refer to the list below for future screening recommendations based on patient's age, sex and/or  medical conditions. Orders for these recommended tests are listed in the plan section. The patient has been provided with a written plan.    Health Maintenance   Topic Date Due   • LIPID PANEL  10/18/2020   • DXA SCAN  11/04/2021   • MAMMOGRAM  11/09/2022   • TDAP/TD VACCINES (2 - Td) 10/04/2025   • INFLUENZA VACCINE  Completed   • ZOSTER VACCINE  Completed          The following portions of the patient's history were reviewed and updated as appropriate: allergies, current medications, past family history, past medical history, past social history, past surgical history and problem list.    Outpatient Medications Prior to Visit   Medication Sig Dispense Refill   • citalopram (CeleXA) 20 MG tablet TAKE ONE TABLET BY MOUTH DAILY 90 tablet 1   • fexofenadine (ALLEGRA) 180 MG tablet Take 180 mg by mouth As Needed.     • levothyroxine (SYNTHROID, LEVOTHROID) 50 MCG tablet Take 1 tablet by mouth Daily. 90 tablet 0   • moxifloxacin (VIGAMOX) 0.5 % ophthalmic solution      • timolol (TIMOPTIC) 0.5 % ophthalmic solution      • TRAVATAN Z 0.004 % solution ophthalmic solution Administer 1 drop to both eyes Every Night.     • triamterene-hydrochlorothiazide (MAXZIDE-25) 37.5-25 MG per tablet TAKE ONE TABLET BY MOUTH DAILY 90 tablet 3   • XIIDRA 5 % ophthalmic solution        No facility-administered medications prior to visit.        Patient Active Problem List   Diagnosis   • Hypertension   • Hyperlipidemia   • Mood disorder (CMS/HCC)   • Allergic rhinitis   • Osteopenia   • Insomnia   • Nocturia   • Chronic fatigue   • Other microscopic hematuria   • Acquired hypothyroidism   • Hypothyroidism   • Mild incontinence   • OA (osteoarthritis) of knee       Advanced Care Planning:  ACP discussion was held with the patient during this visit. Patient does not have an advance directive, declines further assistance.    Review of Systems    Compared to one year ago, the patient feels her physical health is the same.  Compared to one  "year ago, the patient feels her mental health is the same.    Reviewed chart for potential of high risk medication in the elderly: not applicable  Reviewed chart for potential of harmful drug interactions in the elderly:yes    Objective         Vitals:    11/09/20 0853   BP: 118/76   BP Location: Right arm   Patient Position: Sitting   Cuff Size: Adult   Pulse: 65   Temp: 97.3 °F (36.3 °C)   SpO2: 100%   Weight: 62.1 kg (137 lb)   Height: 154.9 cm (61\")       Body mass index is 25.89 kg/m².  Discussed the patient's BMI with her. The BMI is in the acceptable range.    Physical Exam          Assessment/Plan   Medicare Risks and Personalized Health Plan  CMS Preventative Services Quick Reference  Advance Directive Discussion  Immunizations Discussed/Encouraged (specific immunizations; Pneumococcal 23 )  Inactivity/Sedentary  Urinary Incontinence    The above risks/problems have been discussed with the patient.  Pertinent information has been shared with the patient in the After Visit Summary.  Follow up plans and orders are seen below in the Assessment/Plan Section.    Diagnoses and all orders for this visit:    1. Medicare annual wellness visit, subsequent (Primary)    2. Essential hypertension  Comments:  call if bp over 140/90  Orders:  -     CBC & Differential  -     Comprehensive Metabolic Panel  -     Lipid Panel  -     Urinalysis With Microscopic If Indicated (No Culture) - Urine, Clean Catch    3. Other hyperlipidemia  -     Comprehensive Metabolic Panel  -     Lipid Panel    4. Mood disorder (CMS/HCC)  Comments:  ok to restart daily celexa    5. Acquired hypothyroidism  Comments:  lab today  Orders:  -     TSH Rfx On Abnormal To Free T4    6. Mild incontinence  Comments:  need kegel exercises sev x daily - call if worse    7. Screening for viral disease  -     Hepatitis C Antibody      Follow Up:  Return in about 6 months (around 5/9/2021) for Recheck.     An After Visit Summary and PPPS were given to the " patient.      Need daily strengthening & balance exercises (shown today).       Need pneu vaccine? - she will let us know if done at pharmacy.

## 2020-11-09 NOTE — PROGRESS NOTES
"Subjective   Kristie Franz is a 79 y.o. female here for   Chief Complaint   Patient presents with   • Medicare Wellness-subsequent   .    Vitals:    11/09/20 0853   BP: 118/76   BP Location: Right arm   Patient Position: Sitting   Cuff Size: Adult   Pulse: 65   Temp: 97.3 °F (36.3 °C)   SpO2: 100%   Weight: 62.1 kg (137 lb)   Height: 154.9 cm (61\")       Body mass index is 25.89 kg/m².    History of Present Illness     The following portions of the patient's history were reviewed and updated as appropriate: allergies, current medications, past social history and problem list.    Review of Systems    Objective   Physical Exam    Assessment/Plan   There are no diagnoses linked to this encounter.       "

## 2020-11-09 NOTE — PATIENT INSTRUCTIONS
Medicare Wellness  Personal Prevention Plan of Service     Date of Office Visit:  2020  Encounter Provider:  Marisol Broussard MD  Place of Service:  [facility]  Patient Name: Kristie Franz  :  1940    As part of the Medicare Wellness portion of your visit today, we are providing you with this personalized preventive plan of services (PPPS). This plan is based upon recommendations of the [place] Preventive Services Task Force (USPSTF) and the Advisory Committee on Immunization Practices (ACIP).    This lists the preventive care services that should be considered, and provides dates of when you are due. Items listed as completed are up-to-date and do not require any further intervention.    Health Maintenance   Topic Date Due   • HEPATITIS C SCREENING  2016   • Pneumococcal Vaccine 65+ (2 of 2 - PPSV23) 2017   • LIPID PANEL  10/18/2020   • ANNUAL WELLNESS VISIT  2020   • DXA SCAN  2021   • MAMMOGRAM  2021   • COLOGUARD  2022   • TDAP/TD VACCINES (2 - Td) 10/04/2025   • INFLUENZA VACCINE  Completed   • ZOSTER VACCINE  Completed       No orders of the defined types were placed in this encounter.      No follow-ups on file.

## 2020-11-10 LAB
ALBUMIN SERPL-MCNC: 4.3 G/DL (ref 3.5–5.2)
ALBUMIN/GLOB SERPL: 1.9 G/DL
ALP SERPL-CCNC: 93 U/L (ref 39–117)
ALT SERPL-CCNC: 14 U/L (ref 1–33)
APPEARANCE UR: CLEAR
AST SERPL-CCNC: 17 U/L (ref 1–32)
BACTERIA #/AREA URNS HPF: ABNORMAL /HPF
BASOPHILS # BLD AUTO: 0.07 10*3/MM3 (ref 0–0.2)
BASOPHILS NFR BLD AUTO: 1 % (ref 0–1.5)
BILIRUB SERPL-MCNC: 0.5 MG/DL (ref 0–1.2)
BILIRUB UR QL STRIP: NEGATIVE
BUN SERPL-MCNC: 24 MG/DL (ref 8–23)
BUN/CREAT SERPL: 27 (ref 7–25)
CALCIUM SERPL-MCNC: 9.6 MG/DL (ref 8.6–10.5)
CHLORIDE SERPL-SCNC: 104 MMOL/L (ref 98–107)
CHOLEST SERPL-MCNC: 252 MG/DL (ref 0–200)
CO2 SERPL-SCNC: 29.1 MMOL/L (ref 22–29)
COLOR UR: YELLOW
CREAT SERPL-MCNC: 0.89 MG/DL (ref 0.57–1)
EOSINOPHIL # BLD AUTO: 0.2 10*3/MM3 (ref 0–0.4)
EOSINOPHIL NFR BLD AUTO: 2.9 % (ref 0.3–6.2)
EPI CELLS #/AREA URNS HPF: ABNORMAL /HPF
ERYTHROCYTE [DISTWIDTH] IN BLOOD BY AUTOMATED COUNT: 12.4 % (ref 12.3–15.4)
GLOBULIN SER CALC-MCNC: 2.3 GM/DL
GLUCOSE SERPL-MCNC: 94 MG/DL (ref 65–99)
GLUCOSE UR QL: NEGATIVE
HCT VFR BLD AUTO: 41.6 % (ref 34–46.6)
HCV AB S/CO SERPL IA: <0.1 S/CO RATIO (ref 0–0.9)
HDLC SERPL-MCNC: 90 MG/DL (ref 40–60)
HGB BLD-MCNC: 13.7 G/DL (ref 12–15.9)
HGB UR QL STRIP: ABNORMAL
IMM GRANULOCYTES # BLD AUTO: 0.03 10*3/MM3 (ref 0–0.05)
IMM GRANULOCYTES NFR BLD AUTO: 0.4 % (ref 0–0.5)
KETONES UR QL STRIP: ABNORMAL
LDLC SERPL CALC-MCNC: 145 MG/DL (ref 0–100)
LEUKOCYTE ESTERASE UR QL STRIP: ABNORMAL
LYMPHOCYTES # BLD AUTO: 1.5 10*3/MM3 (ref 0.7–3.1)
LYMPHOCYTES NFR BLD AUTO: 22.1 % (ref 19.6–45.3)
MCH RBC QN AUTO: 30.4 PG (ref 26.6–33)
MCHC RBC AUTO-ENTMCNC: 32.9 G/DL (ref 31.5–35.7)
MCV RBC AUTO: 92.2 FL (ref 79–97)
MONOCYTES # BLD AUTO: 0.48 10*3/MM3 (ref 0.1–0.9)
MONOCYTES NFR BLD AUTO: 7.1 % (ref 5–12)
NEUTROPHILS # BLD AUTO: 4.52 10*3/MM3 (ref 1.7–7)
NEUTROPHILS NFR BLD AUTO: 66.5 % (ref 42.7–76)
NITRITE UR QL STRIP: NEGATIVE
NRBC BLD AUTO-RTO: 0 /100 WBC (ref 0–0.2)
PH UR STRIP: 5.5 [PH] (ref 5–8)
PLATELET # BLD AUTO: 263 10*3/MM3 (ref 140–450)
POTASSIUM SERPL-SCNC: 4.6 MMOL/L (ref 3.5–5.2)
PROT SERPL-MCNC: 6.6 G/DL (ref 6–8.5)
PROT UR QL STRIP: ABNORMAL
RBC # BLD AUTO: 4.51 10*6/MM3 (ref 3.77–5.28)
RBC #/AREA URNS HPF: ABNORMAL /HPF
SODIUM SERPL-SCNC: 141 MMOL/L (ref 136–145)
SP GR UR: 1.02 (ref 1–1.03)
TRIGL SERPL-MCNC: 98 MG/DL (ref 0–150)
TSH SERPL DL<=0.005 MIU/L-ACNC: 1.07 UIU/ML (ref 0.27–4.2)
UROBILINOGEN UR STRIP-MCNC: ABNORMAL MG/DL
VLDLC SERPL CALC-MCNC: 17 MG/DL (ref 5–40)
WBC # BLD AUTO: 6.8 10*3/MM3 (ref 3.4–10.8)
WBC #/AREA URNS HPF: ABNORMAL /HPF

## 2020-12-14 RX ORDER — CITALOPRAM 20 MG/1
20 TABLET ORAL DAILY
Qty: 90 TABLET | Refills: 1 | Status: SHIPPED | OUTPATIENT
Start: 2020-12-14 | End: 2021-06-03 | Stop reason: SDUPTHER

## 2020-12-14 RX ORDER — LEVOTHYROXINE SODIUM 0.05 MG/1
50 TABLET ORAL DAILY
Qty: 90 TABLET | Refills: 0 | Status: SHIPPED | OUTPATIENT
Start: 2020-12-14 | End: 2021-06-03 | Stop reason: SDUPTHER

## 2021-01-31 ENCOUNTER — IMMUNIZATION (OUTPATIENT)
Dept: VACCINE CLINIC | Facility: HOSPITAL | Age: 81
End: 2021-01-31

## 2021-01-31 PROCEDURE — 0001A: CPT | Performed by: INTERNAL MEDICINE

## 2021-01-31 PROCEDURE — 91300 HC SARSCOV02 VAC 30MCG/0.3ML IM: CPT | Performed by: INTERNAL MEDICINE

## 2021-02-21 ENCOUNTER — IMMUNIZATION (OUTPATIENT)
Dept: VACCINE CLINIC | Facility: HOSPITAL | Age: 81
End: 2021-02-21

## 2021-02-21 PROCEDURE — 0002A: CPT | Performed by: INTERNAL MEDICINE

## 2021-02-21 PROCEDURE — 91300 HC SARSCOV02 VAC 30MCG/0.3ML IM: CPT | Performed by: INTERNAL MEDICINE

## 2021-03-02 ENCOUNTER — PATIENT MESSAGE (OUTPATIENT)
Dept: INTERNAL MEDICINE | Facility: CLINIC | Age: 81
End: 2021-03-02

## 2021-03-02 RX ORDER — TRIAMTERENE AND HYDROCHLOROTHIAZIDE 37.5; 25 MG/1; MG/1
1 TABLET ORAL DAILY
Qty: 90 TABLET | Refills: 3 | Status: SHIPPED | OUTPATIENT
Start: 2021-03-02 | End: 2021-08-31 | Stop reason: SDUPTHER

## 2021-03-02 NOTE — TELEPHONE ENCOUNTER
From: Kristie Franz  To: Marisol Broussard MD  Sent: 3/2/2021 10:37 AM EST  Subject: Prescription Question    I need a refill of my Triamterine HCTZ but could not access by selecting it through prescription request.

## 2021-04-22 ENCOUNTER — OFFICE VISIT (OUTPATIENT)
Dept: ORTHOPEDIC SURGERY | Facility: CLINIC | Age: 81
End: 2021-04-22

## 2021-04-22 VITALS — HEIGHT: 61 IN | WEIGHT: 135 LBS | BODY MASS INDEX: 25.49 KG/M2 | TEMPERATURE: 96 F

## 2021-04-22 DIAGNOSIS — M17.12 PRIMARY OSTEOARTHRITIS OF LEFT KNEE: ICD-10-CM

## 2021-04-22 DIAGNOSIS — R52 PAIN: Primary | ICD-10-CM

## 2021-04-22 DIAGNOSIS — Z96.651 STATUS POST RIGHT KNEE REPLACEMENT: ICD-10-CM

## 2021-04-22 PROCEDURE — 73562 X-RAY EXAM OF KNEE 3: CPT | Performed by: ORTHOPAEDIC SURGERY

## 2021-04-22 PROCEDURE — 20610 DRAIN/INJ JOINT/BURSA W/O US: CPT | Performed by: ORTHOPAEDIC SURGERY

## 2021-04-22 PROCEDURE — 99213 OFFICE O/P EST LOW 20 MIN: CPT | Performed by: ORTHOPAEDIC SURGERY

## 2021-04-22 RX ORDER — METHYLPREDNISOLONE ACETATE 80 MG/ML
80 INJECTION, SUSPENSION INTRA-ARTICULAR; INTRALESIONAL; INTRAMUSCULAR; SOFT TISSUE
Status: COMPLETED | OUTPATIENT
Start: 2021-04-22 | End: 2021-04-22

## 2021-04-22 RX ADMIN — METHYLPREDNISOLONE ACETATE 80 MG: 80 INJECTION, SUSPENSION INTRA-ARTICULAR; INTRALESIONAL; INTRAMUSCULAR; SOFT TISSUE at 09:12

## 2021-04-22 NOTE — PROGRESS NOTES
"Patient: Kristie Franz  YOB: 1940 80 y.o. female  Medical Record Number: [id number]    Chief Complaints:   Chief Complaint   Patient presents with   • Left Knee - Follow-up       History of Present Illness:Kristie Franz is a 80 y.o. female who presents for follow-up of left knee.  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  She is now complaining of some aching discomfort within the left knee which is limiting activities.  At times she feels instability.    Allergies:   Allergies   Allergen Reactions   • Amoxicillin Rash       Medications:   Current Outpatient Medications   Medication Sig Dispense Refill   • fexofenadine (ALLEGRA) 180 MG tablet Take 180 mg by mouth As Needed.     • levothyroxine (SYNTHROID, LEVOTHROID) 50 MCG tablet Take 1 tablet by mouth Daily. 90 tablet 0   • triamterene-hydrochlorothiazide (MAXZIDE-25) 37.5-25 MG per tablet Take 1 tablet by mouth Daily. (Patient taking differently: Take 1 tablet by mouth Daily. Every other day) 90 tablet 3   • citalopram (CeleXA) 20 MG tablet Take 1 tablet by mouth Daily. 90 tablet 1   • moxifloxacin (VIGAMOX) 0.5 % ophthalmic solution      • timolol (TIMOPTIC) 0.5 % ophthalmic solution      • TRAVATAN Z 0.004 % solution ophthalmic solution Administer 1 drop to both eyes Every Night.     • XIIDRA 5 % ophthalmic solution        No current facility-administered medications for this visit.         The following portions of the patient's history were reviewed and updated as appropriate: allergies, current medications, past family history, past medical history, past social history, past surgical history and problem list.    Review of Systems:   A 14 point review of systems was performed. All systems negative except pertinent positives/negative listed in HPI above    Physical Exam:   Vitals:    04/22/21 0851   Temp: 96 °F (35.6 °C)   Weight: 61.2 kg (135 lb)   Height: 154.9 cm (61\")   PainSc:   7   PainLoc: Knee "       General: A and O x 3, ASA, NAD    SCLERA:    Normal    DENTITION:   Normal  Knee:  left    ALIGNMENT:     Varus  ,   Patella  tracks  midline    GAIT:    Antalgic    SKIN:    No abnormality    RANGE OF MOTION:   Zero-120   DEG    STRENGTH:   4  / 5    LIGAMENTS:    No varus / valgus instability.   Negative  Lachman.    MENISCUS:     Negative   Evan       DISTAL PULSES:    Paplable    DISTAL SENSATION :   Intact    LYMPHATICS:     No   lymphadenopathy    OTHER:          - Positive   effusion      - Crepitance with ROM         Radiology:  Xrays 3views left knee (ap,lateral, sunrise) were ordered and reviewed for evaluation of knee pain demonstrating advanced degenerative changes with joint space narrowing and chondrocalcinosis involving all three compartments  todays xrays were compared to previous xrays and show new findings as described above.  There is a well-positioned right total knee without complicating factors    Assessment/Plan:  Left knee osteoarthritis with chondrocalcinosis I injected the knee as below.  We will send her to therapy.  She may go on to require knee replacement but for now we will continue with conservative measures.  Right total knee doing well  Large Joint Arthrocentesis: L knee  Date/Time: 4/22/2021 9:12 AM  Consent given by: patient  Site marked: site marked  Timeout: Immediately prior to procedure a time out was called to verify the correct patient, procedure, equipment, support staff and site/side marked as required   Supporting Documentation  Indications: pain and joint swelling   Procedure Details  Location: knee - L knee  Preparation: Patient was prepped and draped in the usual sterile fashion  Needle size: 22 G  Approach: anterolateral  Medications administered: 80 mg methylPREDNISolone acetate 80 MG/ML; 2 mL lidocaine (cardiac)  Patient tolerance: patient tolerated the procedure well with no immediate complications

## 2021-04-29 ENCOUNTER — TELEPHONE (OUTPATIENT)
Dept: ORTHOPEDIC SURGERY | Facility: CLINIC | Age: 81
End: 2021-04-29

## 2021-04-29 DIAGNOSIS — M17.12 PRIMARY OSTEOARTHRITIS OF LEFT KNEE: Primary | ICD-10-CM

## 2021-05-10 ENCOUNTER — OFFICE VISIT (OUTPATIENT)
Dept: INTERNAL MEDICINE | Facility: CLINIC | Age: 81
End: 2021-05-10

## 2021-05-10 VITALS
HEART RATE: 68 BPM | SYSTOLIC BLOOD PRESSURE: 108 MMHG | BODY MASS INDEX: 26.24 KG/M2 | OXYGEN SATURATION: 99 % | HEIGHT: 61 IN | DIASTOLIC BLOOD PRESSURE: 72 MMHG | TEMPERATURE: 97.7 F | WEIGHT: 139 LBS

## 2021-05-10 DIAGNOSIS — E78.49 OTHER HYPERLIPIDEMIA: ICD-10-CM

## 2021-05-10 DIAGNOSIS — J30.1 ALLERGIC RHINITIS DUE TO POLLEN, UNSPECIFIED SEASONALITY: ICD-10-CM

## 2021-05-10 DIAGNOSIS — E03.9 ACQUIRED HYPOTHYROIDISM: ICD-10-CM

## 2021-05-10 DIAGNOSIS — I10 ESSENTIAL HYPERTENSION: Primary | ICD-10-CM

## 2021-05-10 DIAGNOSIS — Z12.31 ENCOUNTER FOR SCREENING MAMMOGRAM FOR BREAST CANCER: ICD-10-CM

## 2021-05-10 DIAGNOSIS — M79.642 PAIN IN BOTH HANDS: ICD-10-CM

## 2021-05-10 DIAGNOSIS — M79.641 PAIN IN BOTH HANDS: ICD-10-CM

## 2021-05-10 DIAGNOSIS — F39 MOOD DISORDER (HCC): ICD-10-CM

## 2021-05-10 DIAGNOSIS — Z78.0 MENOPAUSE: ICD-10-CM

## 2021-05-10 PROCEDURE — 99214 OFFICE O/P EST MOD 30 MIN: CPT | Performed by: INTERNAL MEDICINE

## 2021-05-10 NOTE — ASSESSMENT & PLAN NOTE
"Psychological condition is improving with treatment.  Continue current treatment regimen.  Regular aerobic exercise.  Referral to psychological counseling.  Psychological condition  will be reassessed at the next regular appointment.    I gave her info on \"the couch\" which she is considering.  "

## 2021-05-25 ENCOUNTER — TREATMENT (OUTPATIENT)
Dept: PHYSICAL THERAPY | Facility: CLINIC | Age: 81
End: 2021-05-25

## 2021-05-25 DIAGNOSIS — R68.89 DECREASED FUNCTIONAL ACTIVITY TOLERANCE: ICD-10-CM

## 2021-05-25 DIAGNOSIS — G89.29 CHRONIC PAIN OF LEFT KNEE: ICD-10-CM

## 2021-05-25 DIAGNOSIS — R53.1 WEAKNESS: ICD-10-CM

## 2021-05-25 DIAGNOSIS — M17.12 PRIMARY OSTEOARTHRITIS OF LEFT KNEE: Primary | ICD-10-CM

## 2021-05-25 DIAGNOSIS — M25.562 CHRONIC PAIN OF LEFT KNEE: ICD-10-CM

## 2021-05-25 PROCEDURE — 97161 PT EVAL LOW COMPLEX 20 MIN: CPT | Performed by: PHYSICAL THERAPIST

## 2021-05-25 PROCEDURE — 97110 THERAPEUTIC EXERCISES: CPT | Performed by: PHYSICAL THERAPIST

## 2021-05-25 NOTE — PATIENT INSTRUCTIONS
Access Code: [email or web address]  URL: [email or web address]  Date: 05/25/2021  Prepared by: Camille Coulter    Exercises  Active Straight Leg Raise with Quad Set - 1 x daily - 7 x weekly - 10 reps - 3 sets  Sidelying Hip Abduction - 1 x daily - 7 x weekly - 10 reps - 3 sets  Bridge - 1 x daily - 7 x weekly - 10 reps - 3 sets  Pt was educated on findings of evaluation, purpose of treatment and goals for therapy. Treatment options discussed and questions answered. Pt was educated on exercises, self treatment and pain relief techniques.

## 2021-05-25 NOTE — PROGRESS NOTES
Physical Therapy Initial Evaluation and Plan of Care    Patient: Kristie Franz   : 1940  Diagnosis/ICD-10 Code:  Primary osteoarthritis of left knee [M17.12]  Referring practitioner: FRED Lindsay    Subjective Evaluation    Pain  Current pain ratin  At best pain ratin  At worst pain ratin  Location: L knee   Quality: discomfort and dull ache  Relieving factors: change in position and medications  Aggravating factors: ambulation, stairs, standing and squatting    Treatments  Previous treatment: injection treatment  Current treatment: physical therapy  Patient Goals  Patient goals for therapy: decreased pain, increased motion, increased strength, independence with ADLs/IADLs and return to sport/leisure activities             Objective          Active Range of Motion   Left Knee   Flexion: 130 degrees   Extension: 0 degrees     Right Knee   Flexion: 120 degrees   Extension: 0 degrees     Strength/Myotome Testing     Left Hip   Planes of Motion   Flexion: 4  Abduction: 4    Right Hip   Planes of Motion   Flexion: 4  Abduction: 4    Left Knee   Flexion: 4+  Extension: 5  Quadriceps contraction: good    Right Knee   Flexion: 4+  Extension: 5  Quadriceps contraction: good    Functional Assessment     Comments  LEFS: 53          Assessment & Plan     Assessment  Impairments: activity intolerance, impaired physical strength, lacks appropriate home exercise program, pain with function and weight-bearing intolerance  Assessment details: Pt presents to PT with symptoms consistent with L knee OA, L knee pain, weakness, decreased functional activity tolerance.   Pt would benefit from skilled PT intervention to address the deficits noted.   Prognosis: good  Prognosis details:       Functional Limitations: walking, uncomfortable because of pain, sitting, standing, stooping and unable to perform repetitive tasks  Goals  Plan Goals: SHORT TERM GOALS: Time for Goal Achievement: 6 visits    1.  Patient to be  compliant with HEP.   2.  Pt able to ascend/descend steps and transfer with less knee pain < 4/10  3.  Increased hip joint mobility to allow for decreased stress at tibiofemoral joint  4.  Pt. to exhibit increased LE endurance/strength to 4+/5 to allow for increased ease with sit-stand and standing/walking > 30 minutes    LONG TERM GOALS: Time for Goal Achievement: 12 visits  1.  Pt to score 55 or greater on LEFS  2.  Patient able to ascend/descend steps and prolonged standing & cooking with pain < 2/10  3.  Pt to exhibit LE endurance/strength to 5/5 to allow for walking, steps and transfers to occur safely  4.  Pt to demonstrate increased stability of the knee to balance on foam as needed to traverse uneven terrain .          Plan  Therapy options: will be seen for skilled physical therapy services  Planned modality interventions: ultrasound, electrical stimulation/Russian stimulation, thermotherapy (hydrocollator packs) and cryotherapy  Planned therapy interventions: balance/weight-bearing training, body mechanics training, functional ROM exercises, flexibility, home exercise program, joint mobilization, stretching, strengthening, soft tissue mobilization, neuromuscular re-education, manual therapy and therapeutic activities  Frequency: 2x week  Duration in visits: 12  Treatment plan discussed with: patient        Manual Therapy:          mins  98724;  Therapeutic Exercise:     10     mins  99206;     Neuromuscular Honey:         mins  66715;    Therapeutic Activity:           mins  20885;     Gait Training:            mins  47115;     Ultrasound:           mins  85570;    Electrical Stimulation:          mins  98835 ( );  Dry Needling           mins self-pay  Traction           mins 60590  Canalith Repositioning         mins 13153      Timed Treatment:  10    mins   Total Treatment:      30  mins    PT SIGNATURE: Camille Coulter PT   [place] Lic #[id number]    DATE TREATMENT INITIATED: 5/25/2021    Initial  Certification  Certification Period: 8/23/2021  I certify that the therapy services are furnished while this patient is under my care.  The services outlined above are required by this patient, and will be reviewed every 90 days.     PHYSICIAN: Lam Staley APRN      DATE:     Please sign and return via fax to [phone].. Thank you, [facility].

## 2021-06-03 RX ORDER — LEVOTHYROXINE SODIUM 0.05 MG/1
50 TABLET ORAL DAILY
Qty: 90 TABLET | Refills: 0 | Status: SHIPPED | OUTPATIENT
Start: 2021-06-03 | End: 2021-09-12 | Stop reason: SDUPTHER

## 2021-06-03 RX ORDER — CITALOPRAM 20 MG/1
20 TABLET ORAL DAILY
Qty: 90 TABLET | Refills: 1 | Status: SHIPPED | OUTPATIENT
Start: 2021-06-03 | End: 2021-09-12 | Stop reason: SDUPTHER

## 2021-06-08 ENCOUNTER — TREATMENT (OUTPATIENT)
Dept: PHYSICAL THERAPY | Facility: CLINIC | Age: 81
End: 2021-06-08

## 2021-06-08 DIAGNOSIS — G89.29 CHRONIC PAIN OF LEFT KNEE: ICD-10-CM

## 2021-06-08 DIAGNOSIS — M25.562 CHRONIC PAIN OF LEFT KNEE: ICD-10-CM

## 2021-06-08 DIAGNOSIS — R53.1 WEAKNESS: ICD-10-CM

## 2021-06-08 DIAGNOSIS — R68.89 DECREASED FUNCTIONAL ACTIVITY TOLERANCE: ICD-10-CM

## 2021-06-08 DIAGNOSIS — M17.12 PRIMARY OSTEOARTHRITIS OF LEFT KNEE: Primary | ICD-10-CM

## 2021-06-08 PROCEDURE — 97110 THERAPEUTIC EXERCISES: CPT | Performed by: PHYSICAL THERAPIST

## 2021-06-08 NOTE — PROGRESS NOTES
Physical Therapy Daily Progress Note  Visit: 2    Subjective Kristie Franz reports: compliance with HEP.     Objective   See Exercise, Manual, and Modality Logs for complete treatment.     Assessment/Plan: Compliant/cooperative with current rehab efforts.  Benefits from verbal/tactile cues to ensure correct exercise performance/technique, hold time and position. Plan details: Progress ROM / strengthening / stabilization / functional activity as tolerated     Manual Therapy:          mins  12490;  Therapeutic Exercise:     40     mins  41808;     Neuromuscular Honey:         mins  61417;    Therapeutic Activity:           mins  79427;     Gait Training:            mins  18812;     Ultrasound:           mins  71753;    Electrical Stimulation:          mins  18047 ( );  Dry Needling           mins self-pay  Traction           mins 59670  Canalith Repositioning         mins 95823      Timed Treatment: 40     mins   Total Treatment:    40    mins    JV Mccarthy License #: [id number]    Physical Therapist

## 2021-06-10 ENCOUNTER — TREATMENT (OUTPATIENT)
Dept: PHYSICAL THERAPY | Facility: CLINIC | Age: 81
End: 2021-06-10

## 2021-06-10 DIAGNOSIS — G89.29 CHRONIC PAIN OF LEFT KNEE: ICD-10-CM

## 2021-06-10 DIAGNOSIS — R68.89 DECREASED FUNCTIONAL ACTIVITY TOLERANCE: ICD-10-CM

## 2021-06-10 DIAGNOSIS — M17.12 PRIMARY OSTEOARTHRITIS OF LEFT KNEE: Primary | ICD-10-CM

## 2021-06-10 DIAGNOSIS — M25.562 CHRONIC PAIN OF LEFT KNEE: ICD-10-CM

## 2021-06-10 DIAGNOSIS — R53.1 WEAKNESS: ICD-10-CM

## 2021-06-10 PROCEDURE — 97110 THERAPEUTIC EXERCISES: CPT | Performed by: PHYSICAL THERAPIST

## 2021-06-10 NOTE — PROGRESS NOTES
Physical Therapy Daily Progress Note  Visit: 3    Subjective Kristie Franz reports: now new c/o today     Objective   See Exercise, Manual, and Modality Logs for complete treatment.     Assessment/Plan: Compliant/cooperative with current rehab efforts.  Benefits from verbal/tactile cues to ensure correct exercise performance/technique, hold time and position. Plan details: Progress ROM / strengthening / stabilization / functional activity as tolerated     Manual Therapy:          mins  52033;  Therapeutic Exercise:  30        mins  29201;     Neuromuscular Honey:         mins  81095;    Therapeutic Activity:           mins  64409;     Gait Training:            mins  74196;     Ultrasound:           mins  50146;    Electrical Stimulation:          mins  38750 ( );  Dry Needling           mins self-pay  Traction           mins 01959  Canalith Repositioning         mins 64343      Timed Treatment:  30    mins   Total Treatment:      30  mins    JV Mccarthy License #: [id number]    Physical Therapist

## 2021-06-21 ENCOUNTER — TREATMENT (OUTPATIENT)
Dept: PHYSICAL THERAPY | Facility: CLINIC | Age: 81
End: 2021-06-21

## 2021-06-21 DIAGNOSIS — R68.89 DECREASED FUNCTIONAL ACTIVITY TOLERANCE: ICD-10-CM

## 2021-06-21 DIAGNOSIS — M17.12 PRIMARY OSTEOARTHRITIS OF LEFT KNEE: Primary | ICD-10-CM

## 2021-06-21 DIAGNOSIS — G89.29 CHRONIC PAIN OF LEFT KNEE: ICD-10-CM

## 2021-06-21 DIAGNOSIS — R53.1 WEAKNESS: ICD-10-CM

## 2021-06-21 DIAGNOSIS — M25.562 CHRONIC PAIN OF LEFT KNEE: ICD-10-CM

## 2021-06-21 PROCEDURE — 97110 THERAPEUTIC EXERCISES: CPT | Performed by: PHYSICAL THERAPIST

## 2021-06-21 NOTE — PROGRESS NOTES
Physical Therapy Daily Progress Note  Visit: 4    Subjective Kirstie Franz reports:  She did a lot of walking while on vacation and tolerated it well. Report still not confident with balance or steps without railing     Objective   See Exercise, Manual, and Modality Logs for complete treatment.     Assessment/Plan: Add balance activities next visit. Compliant/cooperative with current rehab efforts.  Benefits from verbal/tactile cues to ensure correct exercise performance/technique, hold time and position. Plan details: Progress ROM / strengthening / stabilization / functional activity as tolerated     Manual Therapy:          mins  09726;  Therapeutic Exercise:     38     mins  89659;     Neuromuscular Honey:         mins  37490;    Therapeutic Activity:           mins  34973;     Gait Training:            mins  38102;     Ultrasound:           mins  12579;    Electrical Stimulation:          mins  82142 ( );  Dry Needling           mins self-pay  Traction           mins 56029  Canalith Repositioning         mins 03622      Timed Treatment:   38  mins   Total Treatment:     38   mins    Camille Coulter PT  [place] License #: [id number]    Physical Therapist

## 2021-06-23 ENCOUNTER — TREATMENT (OUTPATIENT)
Dept: PHYSICAL THERAPY | Facility: CLINIC | Age: 81
End: 2021-06-23

## 2021-06-23 DIAGNOSIS — G89.29 CHRONIC PAIN OF LEFT KNEE: ICD-10-CM

## 2021-06-23 DIAGNOSIS — M25.562 CHRONIC PAIN OF LEFT KNEE: ICD-10-CM

## 2021-06-23 DIAGNOSIS — R68.89 DECREASED FUNCTIONAL ACTIVITY TOLERANCE: ICD-10-CM

## 2021-06-23 DIAGNOSIS — R53.1 WEAKNESS: ICD-10-CM

## 2021-06-23 DIAGNOSIS — M17.12 PRIMARY OSTEOARTHRITIS OF LEFT KNEE: Primary | ICD-10-CM

## 2021-06-23 PROCEDURE — 97112 NEUROMUSCULAR REEDUCATION: CPT | Performed by: PHYSICAL THERAPIST

## 2021-06-23 PROCEDURE — 97110 THERAPEUTIC EXERCISES: CPT | Performed by: PHYSICAL THERAPIST

## 2021-06-23 NOTE — PROGRESS NOTES
Physical Therapy Daily Progress Note  Visit: 5    Subjective Kristie Franz reports: she fell yesterday. Reports she's not injured. She has been watching her daughter's dog and lost her balance and landed on her butt.     Objective   See Exercise, Manual, and Modality Logs for complete treatment. Added balance activities today.     Assessment/Plan: good tolerance to new exercises. Plan details: Progress ROM / strengthening / stabilization / functional activity as tolerated     Manual Therapy:          mins  97559;  Therapeutic Exercise:     25     mins  90655;     Neuromuscular Honey:    20     mins  73769;    Therapeutic Activity:           mins  10107;     Gait Training:            mins  24441;     Ultrasound:           mins  21675;    Electrical Stimulation:          mins  11560 ( );  Dry Needling           mins self-pay  Traction           mins 40731  Canalith Repositioning         mins 97725      Timed Treatment:  45    mins   Total Treatment:     45   mins    JV Mccarthy License #: [id number]    Physical Therapist

## 2021-06-30 ENCOUNTER — TREATMENT (OUTPATIENT)
Dept: PHYSICAL THERAPY | Facility: CLINIC | Age: 81
End: 2021-06-30

## 2021-06-30 DIAGNOSIS — R53.1 WEAKNESS: ICD-10-CM

## 2021-06-30 DIAGNOSIS — G89.29 CHRONIC PAIN OF LEFT KNEE: ICD-10-CM

## 2021-06-30 DIAGNOSIS — R68.89 DECREASED FUNCTIONAL ACTIVITY TOLERANCE: ICD-10-CM

## 2021-06-30 DIAGNOSIS — M25.562 CHRONIC PAIN OF LEFT KNEE: ICD-10-CM

## 2021-06-30 DIAGNOSIS — M17.12 PRIMARY OSTEOARTHRITIS OF LEFT KNEE: Primary | ICD-10-CM

## 2021-06-30 PROCEDURE — 97112 NEUROMUSCULAR REEDUCATION: CPT | Performed by: PHYSICAL THERAPIST

## 2021-06-30 PROCEDURE — 97530 THERAPEUTIC ACTIVITIES: CPT | Performed by: PHYSICAL THERAPIST

## 2021-06-30 PROCEDURE — 97110 THERAPEUTIC EXERCISES: CPT | Performed by: PHYSICAL THERAPIST

## 2021-06-30 NOTE — PROGRESS NOTES
Re-Assessment / Re-Certification        Patient: Kristie Franz   : 1940  Diagnosis/ICD-10 Code:  Primary osteoarthritis of left knee [M17.12]  Referring practitioner: FRED Lindsay  Date of Initial Visit: Type: THERAPY  Noted: 2021  Today's Date: 2021  Patient seen for 6 sessions      Subjective:   Kristie Franz reports: she feels like the exercises and cortisone injection both have made her knee feel much better.   Subjective Questionnaire: LEFS: 54  (53 on initial evaluation)  Clinical Progress: improved  Home Program Compliance: Yes  Treatment has included: therapeutic exercise, neuromuscular re-education and therapeutic activity    Subjective   Pain  Current pain ratin  At best pain ratin  At worst pain ratin  Location: L knee   Objective   Strength/Myotome Testing      Left Hip   Planes of Motion   Flexion: 4+  Abduction: 4+     Right Hip   Planes of Motion   Flexion: 4+  Abduction: 4+     Left Knee   Flexion: 5  Extension: 5  Quadriceps contraction: good     Right Knee   Flexion: 5  Extension: 5  Quadriceps contraction: good    Goals  Plan Goals: SHORT TERM GOALS: Time for Goal Achievement: 6 visits    1.  Patient to be compliant with HEP. MEt  2.  Pt able to ascend/descend steps and transfer with less knee pain < 4/10 Met  3.  Increased hip joint mobility to allow for decreased stress at tibiofemoral joint Met  4.  Pt. to exhibit increased LE endurance/strength to 4+/5 to allow for increased ease with sit-stand and standing/walking > 30 minutes Met    LONG TERM GOALS: Time for Goal Achievement: 12 visits  1.  Pt to score 55 or greater on LEFS Progressing   2.  Patient able to ascend/descend steps and prolonged standing & cooking with pain < 2/10 Met  3.  Pt to exhibit LE endurance/strength to 5/5 to allow for walking, steps and transfers to occur safely Progressing  4.  Pt to demonstrate increased stability of the knee to balance on foam as needed to traverse uneven terrain .     Met  Assessment/Plan  Progress toward previous goals: Partially Met        Recommendations: Continue as planned  Timeframe: 1 month  Prognosis to achieve goals: good    PT Signature: Camille Coulter PT      Based upon review of the patient's progress and continued therapy plan, it is my medical opinion that Kristie Franz should continue physical therapy treatment at [facility]  [address] [facility], [address] [address] [phone].    Signature: __________________________________  Lam Staley APRN    Manual Therapy:         mins  98399;  Therapeutic Exercise: 15        mins  89536;     Neuromuscular Honey: 15       mins  56751;    Therapeutic Activity:    15      mins  01564;     Gait Training:           mins  35620;     Ultrasound:          mins  28997;    Electrical Stimulation:         mins  60446 ( );  Dry Needling          mins self-pay    Timed Treatment:  45    mins   Total Treatment:    45    mins

## 2021-07-02 ENCOUNTER — TREATMENT (OUTPATIENT)
Dept: PHYSICAL THERAPY | Facility: CLINIC | Age: 81
End: 2021-07-02

## 2021-07-02 DIAGNOSIS — G89.29 CHRONIC PAIN OF LEFT KNEE: ICD-10-CM

## 2021-07-02 DIAGNOSIS — R68.89 DECREASED FUNCTIONAL ACTIVITY TOLERANCE: Primary | ICD-10-CM

## 2021-07-02 DIAGNOSIS — M25.562 CHRONIC PAIN OF LEFT KNEE: ICD-10-CM

## 2021-07-02 DIAGNOSIS — R26.9 GAIT DISTURBANCE: ICD-10-CM

## 2021-07-02 DIAGNOSIS — R53.1 WEAKNESS: ICD-10-CM

## 2021-07-02 DIAGNOSIS — M17.12 PRIMARY OSTEOARTHRITIS OF LEFT KNEE: ICD-10-CM

## 2021-07-02 PROCEDURE — 97110 THERAPEUTIC EXERCISES: CPT | Performed by: PHYSICAL THERAPIST

## 2021-07-06 ENCOUNTER — TREATMENT (OUTPATIENT)
Dept: PHYSICAL THERAPY | Facility: CLINIC | Age: 81
End: 2021-07-06

## 2021-07-06 DIAGNOSIS — M17.12 PRIMARY OSTEOARTHRITIS OF LEFT KNEE: ICD-10-CM

## 2021-07-06 DIAGNOSIS — R53.1 WEAKNESS: ICD-10-CM

## 2021-07-06 DIAGNOSIS — R26.9 GAIT DISTURBANCE: ICD-10-CM

## 2021-07-06 DIAGNOSIS — M25.562 CHRONIC PAIN OF LEFT KNEE: ICD-10-CM

## 2021-07-06 DIAGNOSIS — G89.29 CHRONIC PAIN OF LEFT KNEE: ICD-10-CM

## 2021-07-06 DIAGNOSIS — R68.89 DECREASED FUNCTIONAL ACTIVITY TOLERANCE: Primary | ICD-10-CM

## 2021-07-06 PROCEDURE — 97112 NEUROMUSCULAR REEDUCATION: CPT | Performed by: PHYSICAL THERAPIST

## 2021-07-06 PROCEDURE — 97110 THERAPEUTIC EXERCISES: CPT | Performed by: PHYSICAL THERAPIST

## 2021-07-06 PROCEDURE — 97530 THERAPEUTIC ACTIVITIES: CPT | Performed by: PHYSICAL THERAPIST

## 2021-07-06 NOTE — PROGRESS NOTES
Physical Therapy Daily Progress Note    Visit #8    Subjective     Kristie Franz reports: feeling good overall. Knee is a little sore.       Objective   See Exercise, Manual, and Modality Logs for complete treatment.       Assessment/Plan  Added side stepping over hurdles, tolerated well. Pt is adherent and cooperative with current rehab efforts.  Progress per Plan of Care           Manual Therapy:    0     mins  22683;  Therapeutic Exercise:    10     mins  40338;     Neuromuscular Honey:    11    mins  43981;    Therapeutic Activity:     12     mins  82507;     Gait Trainin     mins  92987;     Ultrasound:     0     mins  53932;    Electrical Stimulation:    0     mins  95094 ( );    Timed Treatment:   33   mins   Total Treatment:     35   mins    Danyelle Stokes PT, DPT  Physical Therapist  [place] License #[id number]

## 2021-07-09 ENCOUNTER — TREATMENT (OUTPATIENT)
Dept: PHYSICAL THERAPY | Facility: CLINIC | Age: 81
End: 2021-07-09

## 2021-07-09 DIAGNOSIS — M25.562 CHRONIC PAIN OF LEFT KNEE: ICD-10-CM

## 2021-07-09 DIAGNOSIS — R68.89 DECREASED FUNCTIONAL ACTIVITY TOLERANCE: Primary | ICD-10-CM

## 2021-07-09 DIAGNOSIS — R26.9 GAIT DISTURBANCE: ICD-10-CM

## 2021-07-09 DIAGNOSIS — M17.12 PRIMARY OSTEOARTHRITIS OF LEFT KNEE: ICD-10-CM

## 2021-07-09 DIAGNOSIS — G89.29 CHRONIC PAIN OF LEFT KNEE: ICD-10-CM

## 2021-07-09 DIAGNOSIS — R53.1 WEAKNESS: ICD-10-CM

## 2021-07-09 PROCEDURE — 97112 NEUROMUSCULAR REEDUCATION: CPT | Performed by: PHYSICAL THERAPIST

## 2021-07-09 PROCEDURE — 97110 THERAPEUTIC EXERCISES: CPT | Performed by: PHYSICAL THERAPIST

## 2021-07-09 PROCEDURE — 97530 THERAPEUTIC ACTIVITIES: CPT | Performed by: PHYSICAL THERAPIST

## 2021-07-09 NOTE — PROGRESS NOTES
Physical Therapy Daily Progress Note    Patient: Kristie Franz   : 1940  Diagnosis/ICD-10 Code:  Decreased functional activity tolerance [R68.89]  Referring practitioner: FRED Lindsay  Date of Initial Visit: Type: THERAPY  Noted: 2021  Today's Date: 2021  Patient seen for 9 sessions         Kristie Franz reports: Feeling better, ready to do my exercises on my own at home.    Subjective     Objective   Access Code: [email or web address]  URL: [email or web address]  Date: 2021  Prepared by: Sonia Joiner    Exercises  Active Straight Leg Raise with Quad Set - 1 x daily - 7 x weekly - 10 reps - 3 sets  Sidelying Hip Abduction - 1 x daily - 7 x weekly - 10 reps - 3 sets  Bridge - 1 x daily - 7 x weekly - 10 reps - 3 sets  Mini Squat with Counter Support - 1 x daily - 7 x weekly - 3 sets - 10 reps  Sit to Stand without Arm Support - 1 x daily - 7 x weekly - 3 sets - 10 reps  Lateral Step Up - 1 x daily - 7 x weekly - 3 sets - 10 reps - 3 hold  Runner's Climb - 1 x daily - 7 x weekly - 3 sets - 10 reps - 3 hold  Single Leg Stance - 1 x daily - 7 x weekly - 3 reps    See Exercise, Manual, and Modality Logs for complete treatment.       Assessment/Plan  Subjectively, pt reports no increase of pain or discomfort with interventions performed today. Performed well with established exercises in clinic and for HEP, with good recall of exercise form and technique with minimal need for verbal/tactile cues. Continues to demonstrate increased exercise and functional activity tolerance. Pt is ready to d/c to HEP as she has met many of her goals established in PT and per her request. Answered all pt questions and reviewed final HEP.    Progress per Plan of Care           Manual Therapy:         mins  29343;  Therapeutic Exercise:    20     mins  93835;     Neuromuscular Honey:    10    mins  02472;    Therapeutic Activity:     10     mins  79666;     Gait Training:           mins  87998;     Ultrasound:         Patient notified of results.      mins  56431;    Electrical Stimulation:         mins  65378 ( );  Dry Needling          mins self-pay    Timed Treatment:   40   mins   Total Treatment:     40   mins    Sonia Joiner PTA  Physical Therapist Assistant [id number]

## 2021-08-31 RX ORDER — TRIAMTERENE AND HYDROCHLOROTHIAZIDE 37.5; 25 MG/1; MG/1
1 TABLET ORAL DAILY
Qty: 90 TABLET | Refills: 3 | Status: SHIPPED | OUTPATIENT
Start: 2021-08-31 | End: 2021-11-23

## 2021-09-13 RX ORDER — LEVOTHYROXINE SODIUM 0.05 MG/1
50 TABLET ORAL DAILY
Qty: 90 TABLET | Refills: 0 | Status: SHIPPED | OUTPATIENT
Start: 2021-09-13 | End: 2021-12-09 | Stop reason: SDUPTHER

## 2021-09-13 RX ORDER — CITALOPRAM 20 MG/1
20 TABLET ORAL DAILY
Qty: 90 TABLET | Refills: 1 | Status: SHIPPED | OUTPATIENT
Start: 2021-09-13 | End: 2021-12-27 | Stop reason: SDUPTHER

## 2021-10-02 ENCOUNTER — IMMUNIZATION (OUTPATIENT)
Dept: VACCINE CLINIC | Facility: HOSPITAL | Age: 81
End: 2021-10-02

## 2021-10-02 PROCEDURE — 0004A HC ADM SARSCOV2 30MCG/0.3ML BOOSTER: CPT | Performed by: INTERNAL MEDICINE

## 2021-10-02 PROCEDURE — 0003A: CPT | Performed by: INTERNAL MEDICINE

## 2021-10-02 PROCEDURE — 91300 HC SARSCOV02 VAC 30MCG/0.3ML IM: CPT | Performed by: INTERNAL MEDICINE

## 2021-11-23 ENCOUNTER — APPOINTMENT (OUTPATIENT)
Dept: WOMENS IMAGING | Facility: HOSPITAL | Age: 81
End: 2021-11-23

## 2021-11-23 ENCOUNTER — OFFICE VISIT (OUTPATIENT)
Dept: INTERNAL MEDICINE | Facility: CLINIC | Age: 81
End: 2021-11-23

## 2021-11-23 VITALS
TEMPERATURE: 97.1 F | WEIGHT: 138 LBS | HEART RATE: 55 BPM | DIASTOLIC BLOOD PRESSURE: 80 MMHG | OXYGEN SATURATION: 98 % | HEIGHT: 61 IN | SYSTOLIC BLOOD PRESSURE: 124 MMHG | BODY MASS INDEX: 26.06 KG/M2

## 2021-11-23 DIAGNOSIS — I10 PRIMARY HYPERTENSION: ICD-10-CM

## 2021-11-23 DIAGNOSIS — Z78.0 MENOPAUSE: ICD-10-CM

## 2021-11-23 DIAGNOSIS — Z00.00 MEDICARE ANNUAL WELLNESS VISIT, SUBSEQUENT: Primary | ICD-10-CM

## 2021-11-23 DIAGNOSIS — Z12.31 ENCOUNTER FOR SCREENING MAMMOGRAM FOR BREAST CANCER: ICD-10-CM

## 2021-11-23 DIAGNOSIS — E78.49 OTHER HYPERLIPIDEMIA: ICD-10-CM

## 2021-11-23 DIAGNOSIS — E03.9 ACQUIRED HYPOTHYROIDISM: ICD-10-CM

## 2021-11-23 PROCEDURE — 77067 SCR MAMMO BI INCL CAD: CPT | Performed by: RADIOLOGY

## 2021-11-23 PROCEDURE — 1126F AMNT PAIN NOTED NONE PRSNT: CPT | Performed by: INTERNAL MEDICINE

## 2021-11-23 PROCEDURE — G0439 PPPS, SUBSEQ VISIT: HCPCS | Performed by: INTERNAL MEDICINE

## 2021-11-23 PROCEDURE — 77067 SCR MAMMO BI INCL CAD: CPT | Performed by: INTERNAL MEDICINE

## 2021-11-23 PROCEDURE — 77080 DXA BONE DENSITY AXIAL: CPT | Performed by: INTERNAL MEDICINE

## 2021-11-23 PROCEDURE — 99214 OFFICE O/P EST MOD 30 MIN: CPT | Performed by: INTERNAL MEDICINE

## 2021-11-23 PROCEDURE — 1170F FXNL STATUS ASSESSED: CPT | Performed by: INTERNAL MEDICINE

## 2021-11-23 RX ORDER — HYDROCHLOROTHIAZIDE 12.5 MG/1
TABLET ORAL
Qty: 30 TABLET | Refills: 1 | Status: SHIPPED | OUTPATIENT
Start: 2021-11-23 | End: 2022-01-05 | Stop reason: SDUPTHER

## 2021-11-23 RX ORDER — LOSARTAN POTASSIUM 100 MG/1
100 TABLET ORAL DAILY
Qty: 30 TABLET | Refills: 6 | Status: SHIPPED | OUTPATIENT
Start: 2021-11-23 | End: 2022-01-05 | Stop reason: SDUPTHER

## 2021-11-23 NOTE — PROGRESS NOTES
The ABCs of the Annual Wellness Visit  Subsequent Medicare Wellness Visit    Chief Complaint   Patient presents with   • Medicare Wellness-subsequent   • Hypothyroidism   • Hyperlipidemia      Subjective    History of Present Illness:  Kristie Franz is a 80 y.o. female who presents for a Subsequent Medicare Wellness Visit.    The following portions of the patient's history were reviewed and   updated as appropriate: allergies, current medications, past family history, past medical history, past social history, past surgical history and problem list.    Compared to one year ago, the patient feels her physical   health is the same.    Compared to one year ago, the patient feels her mental   health is the same.    Recent Hospitalizations:  She was not admitted to the hospital during the last year.       Current Medical Providers:  Patient Care Team:  Marisol Broussard MD as PCP - General (Internal Medicine)  Kristie Wallace MD (Dermatology)  Gennaro Yeung MD as Consulting Physician (Orthopedic Surgery)  Edu Bell MD as Consulting Physician (Orthopedic Surgery)  Dee Dee Bell MD as Consulting Physician (Ophthalmology)  Trevon Hunt MD as Surgeon (Orthopedic Surgery)  Stella Argueta DO as Consulting Physician (Ophthalmology)    Outpatient Medications Prior to Visit   Medication Sig Dispense Refill   • citalopram (CeleXA) 20 MG tablet Take 1 tablet by mouth Daily. 90 tablet 1   • fexofenadine (ALLEGRA) 180 MG tablet Take 180 mg by mouth As Needed.     • levothyroxine (SYNTHROID, LEVOTHROID) 50 MCG tablet Take 1 tablet by mouth Daily. 90 tablet 0   • triamterene-hydrochlorothiazide (MAXZIDE-25) 37.5-25 MG per tablet Take 1 tablet by mouth Daily. 90 tablet 3   • moxifloxacin (VIGAMOX) 0.5 % ophthalmic solution      • timolol (TIMOPTIC) 0.5 % ophthalmic solution      • XIIDRA 5 % ophthalmic solution      • TRAVATAN Z 0.004 % solution ophthalmic solution Administer 1 drop to both  "eyes Every Night.       No facility-administered medications prior to visit.       No opioid medication identified on active medication list. I have reviewed chart for other potential  high risk medication/s and harmful drug interactions in the elderly.          Aspirin is not on active medication list.  Aspirin use is not indicated based on review of current medical condition/s. Risk of harm outweighs potential benefits.  .    Patient Active Problem List   Diagnosis   • Hypertension   • Hyperlipidemia   • Mood disorder (HCC)   • Allergic rhinitis   • Osteopenia   • Insomnia   • Nocturia   • Chronic fatigue   • Other microscopic hematuria   • Acquired hypothyroidism   • Mild incontinence   • OA (osteoarthritis) of knee   • Pain in both hands     Advance Care Planning  Advance Directive is not on file.  ACP discussion was held with the patient during this visit. Patient does not have an advance directive, declines further assistance.          Objective    Vitals:    11/23/21 0955   BP: 124/80   Pulse: 55   Temp: 97.1 °F (36.2 °C)   SpO2: 98%   Weight: 62.6 kg (138 lb)   Height: 154.9 cm (60.98\")   PainSc: 0-No pain     BMI Readings from Last 1 Encounters:   11/23/21 26.09 kg/m²   BMI is above normal parameters. Recommendations include: exercise counseling and nutrition counseling    Does the patient have evidence of cognitive impairment? No    Physical Exam            HEALTH RISK ASSESSMENT    Smoking Status:  Social History     Tobacco Use   Smoking Status Never Smoker   Smokeless Tobacco Never Used     Alcohol Consumption:  Social History     Substance and Sexual Activity   Alcohol Use Yes    Comment: Seldom; socially     Fall Risk Screen:    ISIAHADI Fall Risk Assessment was completed, and patient is at LOW risk for falls.Assessment completed on:11/23/2021    Depression Screening:  PHQ-2/PHQ-9 Depression Screening 11/23/2021   Little interest or pleasure in doing things 0   Feeling down, depressed, or hopeless 0 "   Trouble falling or staying asleep, or sleeping too much 0   Feeling tired or having little energy 0   Poor appetite or overeating 0   Feeling bad about yourself - or that you are a failure or have let yourself or your family down 0   Trouble concentrating on things, such as reading the newspaper or watching television 0   Moving or speaking so slowly that other people could have noticed. Or the opposite - being so fidgety or restless that you have been moving around a lot more than usual 0   Thoughts that you would be better off dead, or of hurting yourself in some way 0   Total Score 0   If you checked off any problems, how difficult have these problems made it for you to do your work, take care of things at home, or get along with other people? -       Health Habits and Functional and Cognitive Screening:  Functional & Cognitive Status 11/23/2021   Do you have difficulty preparing food and eating? No   Do you have difficulty bathing yourself, getting dressed or grooming yourself? No   Do you have difficulty using the toilet? No   Do you have difficulty moving around from place to place? No   Do you have trouble with steps or getting out of a bed or a chair? No   Current Diet Well Balanced Diet   Dental Exam Up to date   Eye Exam Up to date   Exercise (times per week) 2 times per week   Current Exercises Include No Regular Exercise        Exercise Comment -   Current Exercise Activities Include -   Do you need help using the phone?  No   Are you deaf or do you have serious difficulty hearing?  No   Do you need help with transportation? No   Do you need help shopping? No   Do you need help preparing meals?  No   Do you need help with housework?  No   Do you need help with laundry? No   Do you need help taking your medications? No   Do you need help managing money? No   Do you ever drive or ride in a car without wearing a seat belt? No   Have you felt unusual stress, anger or loneliness in the last month? No   Who  do you live with? Alone   If you need help, do you have trouble finding someone available to you? No   Have you been bothered in the last four weeks by sexual problems? No   Do you have difficulty concentrating, remembering or making decisions? No       Age-appropriate Screening Schedule:  Refer to the list below for future screening recommendations based on patient's age, sex and/or medical conditions. Orders for these recommended tests are listed in the plan section. The patient has been provided with a written plan.    Health Maintenance   Topic Date Due   • LIPID PANEL  11/09/2021   • MAMMOGRAM  11/23/2023   • DXA SCAN  11/23/2023   • TDAP/TD VACCINES (2 - Td or Tdap) 10/04/2025   • INFLUENZA VACCINE  Completed   • ZOSTER VACCINE  Completed              Assessment/Plan   CMS Preventative Services Quick Reference  Risk Factors Identified During Encounter  Obesity/Overweight   The above risks/problems have been discussed with the patient.  Follow up actions/plans if indicated are seen below in the Assessment/Plan Section.  Pertinent information has been shared with the patient in the After Visit Summary.    Diagnoses and all orders for this visit:    1. Medicare annual wellness visit, subsequent (Primary)    2. Acquired hypothyroidism    3. Primary hypertension    4. Other hyperlipidemia        Follow Up:   No follow-ups on file.     An After Visit Summary and PPPS were made available to the patient.                  Need screening for AAA & carotid disease.  Information given today.   Need daily strengthening & balance exercises (shown today).

## 2021-11-23 NOTE — ASSESSMENT & PLAN NOTE
Hypertension is improving with treatment.  Continue current treatment regimen.  Dietary sodium restriction.  Weight loss.  Medication changes per orders.  Blood pressure will be reassessed in 3 months.    She needs to stop current diuretic (too strong).  Start losartan daily & only add low dose diuretic if high bp or pedal edema. She was warned about dehydration with diuretics.

## 2021-11-23 NOTE — PATIENT INSTRUCTIONS
Medicare Wellness  Personal Prevention Plan of Service     Date of Office Visit:  2021  Encounter Provider:  Marisol Broussard MD  Place of Service:  [facility]  Patient Name: Kristie Franz  :  1940    As part of the Medicare Wellness portion of your visit today, we are providing you with this personalized preventive plan of services (PPPS). This plan is based upon recommendations of the [place] Preventive Services Task Force (USPSTF) and the Advisory Committee on Immunization Practices (ACIP).    This lists the preventive care services that should be considered, and provides dates of when you are due. Items listed as completed are up-to-date and do not require any further intervention.    Health Maintenance   Topic Date Due   • COLORECTAL CANCER SCREENING  2019   • LIPID PANEL  2021   • ANNUAL WELLNESS VISIT  2022   • MAMMOGRAM  2023   • DXA SCAN  2023   • TDAP/TD VACCINES (2 - Td or Tdap) 10/04/2025   • COVID-19 Vaccine  Completed   • INFLUENZA VACCINE  Completed   • Pneumococcal Vaccine 65+  Completed   • ZOSTER VACCINE  Completed       No orders of the defined types were placed in this encounter.      No follow-ups on file.

## 2021-11-23 NOTE — PROGRESS NOTES
"Chief Complaint  Medicare Wellness-subsequent, Hypothyroidism, Hyperlipidemia, and Hypertension    Subjective          Kristie Franz presents to [facility] for   Hypothyroidism  This is a chronic problem. The current episode started more than 1 year ago. The problem occurs constantly. The problem has been unchanged.   Hyperlipidemia  This is a chronic problem. The current episode started more than 1 year ago. The problem is controlled. Recent lipid tests were reviewed and are normal. Exacerbating diseases include hypothyroidism. She has no history of diabetes.   Hypertension  This is a chronic problem. The current episode started more than 1 year ago. The problem is unchanged. The problem is controlled.       Review of Systems     Objective   Vital Signs:   /80   Pulse 55   Temp 97.1 °F (36.2 °C)   Ht 154.9 cm (60.98\")   Wt 62.6 kg (138 lb)   SpO2 98%   BMI 26.09 kg/m²     Physical Exam  Vitals and nursing note reviewed.   Constitutional:       General: She is not in acute distress.     Appearance: She is well-developed.   Cardiovascular:      Rate and Rhythm: Normal rate and regular rhythm.      Heart sounds: Normal heart sounds.   Pulmonary:      Effort: No respiratory distress.      Breath sounds: No wheezing or rales.   Chest:      Chest wall: No tenderness.   Psychiatric:         Behavior: Behavior normal.        Result Review :                 Assessment and Plan    Problem List Items Addressed This Visit        Unprioritized    Hypertension    Current Assessment & Plan     Hypertension is improving with treatment.  Continue current treatment regimen.  Dietary sodium restriction.  Weight loss.  Medication changes per orders.  Blood pressure will be reassessed in 3 months.    She needs to stop current diuretic (too strong).  Start losartan daily & only add low dose diuretic if high bp or pedal edema. She was warned about dehydration with diuretics.         Relevant " Medications    losartan (Cozaar) 100 MG tablet    hydroCHLOROthiazide (HYDRODIURIL) 12.5 MG tablet    Other Relevant Orders    CBC & Differential    Comprehensive Metabolic Panel    Lipid Panel    Urinalysis With Microscopic If Indicated (No Culture) - Urine, Clean Catch    Hyperlipidemia    Current Assessment & Plan     Lipid abnormalities are unchanged.  Nutritional counseling was provided.  Lipids will be reassessed in 6 months.         Relevant Orders    Comprehensive Metabolic Panel    Lipid Panel    Acquired hypothyroidism    Current Assessment & Plan     Continue synthroid 50 mcg daily.         Relevant Orders    TSH Rfx On Abnormal To Free T4      Other Visit Diagnoses     Medicare annual wellness visit, subsequent    -  Primary          Follow Up   Return in about 6 months (around 5/23/2022) for Recheck.  Patient was given instructions and counseling regarding her condition or for health maintenance advice. Please see specific information pulled into the AVS if appropriate.

## 2021-11-24 LAB
ALBUMIN SERPL-MCNC: 4.3 G/DL (ref 3.7–4.7)
ALBUMIN/GLOB SERPL: 1.7 {RATIO} (ref 1.2–2.2)
ALP SERPL-CCNC: 71 IU/L (ref 44–121)
ALT SERPL-CCNC: 11 IU/L (ref 0–32)
APPEARANCE UR: ABNORMAL
AST SERPL-CCNC: 17 IU/L (ref 0–40)
BACTERIA #/AREA URNS HPF: ABNORMAL /[HPF]
BASOPHILS # BLD AUTO: 0.1 X10E3/UL (ref 0–0.2)
BASOPHILS NFR BLD AUTO: 1 %
BILIRUB SERPL-MCNC: 0.8 MG/DL (ref 0–1.2)
BILIRUB UR QL STRIP: NEGATIVE
BUN SERPL-MCNC: 17 MG/DL (ref 8–27)
BUN/CREAT SERPL: 20 (ref 12–28)
CALCIUM SERPL-MCNC: 9.5 MG/DL (ref 8.7–10.3)
CASTS URNS QL MICRO: ABNORMAL /LPF
CHLORIDE SERPL-SCNC: 102 MMOL/L (ref 96–106)
CHOLEST SERPL-MCNC: 262 MG/DL (ref 100–199)
CO2 SERPL-SCNC: 24 MMOL/L (ref 20–29)
COLOR UR: YELLOW
CREAT SERPL-MCNC: 0.84 MG/DL (ref 0.57–1)
EOSINOPHIL # BLD AUTO: 0.1 X10E3/UL (ref 0–0.4)
EOSINOPHIL NFR BLD AUTO: 2 %
EPI CELLS #/AREA URNS HPF: ABNORMAL /HPF (ref 0–10)
ERYTHROCYTE [DISTWIDTH] IN BLOOD BY AUTOMATED COUNT: 11.7 % (ref 11.7–15.4)
GLOBULIN SER CALC-MCNC: 2.5 G/DL (ref 1.5–4.5)
GLUCOSE SERPL-MCNC: 96 MG/DL (ref 65–99)
GLUCOSE UR QL: NEGATIVE
HCT VFR BLD AUTO: 40.2 % (ref 34–46.6)
HDLC SERPL-MCNC: 89 MG/DL
HGB BLD-MCNC: 13.6 G/DL (ref 11.1–15.9)
HGB UR QL STRIP: ABNORMAL
IMM GRANULOCYTES # BLD AUTO: 0 X10E3/UL (ref 0–0.1)
IMM GRANULOCYTES NFR BLD AUTO: 0 %
KETONES UR QL STRIP: NEGATIVE
LDLC SERPL CALC-MCNC: 158 MG/DL (ref 0–99)
LEUKOCYTE ESTERASE UR QL STRIP: ABNORMAL
LYMPHOCYTES # BLD AUTO: 1.7 X10E3/UL (ref 0.7–3.1)
LYMPHOCYTES NFR BLD AUTO: 29 %
MCH RBC QN AUTO: 31.5 PG (ref 26.6–33)
MCHC RBC AUTO-ENTMCNC: 33.8 G/DL (ref 31.5–35.7)
MCV RBC AUTO: 93 FL (ref 79–97)
MICRO URNS: ABNORMAL
MONOCYTES # BLD AUTO: 0.4 X10E3/UL (ref 0.1–0.9)
MONOCYTES NFR BLD AUTO: 7 %
NEUTROPHILS # BLD AUTO: 3.5 X10E3/UL (ref 1.4–7)
NEUTROPHILS NFR BLD AUTO: 61 %
NITRITE UR QL STRIP: NEGATIVE
PH UR STRIP: 5.5 [PH] (ref 5–7.5)
PLATELET # BLD AUTO: 229 X10E3/UL (ref 150–450)
POTASSIUM SERPL-SCNC: 4.4 MMOL/L (ref 3.5–5.2)
PROT SERPL-MCNC: 6.8 G/DL (ref 6–8.5)
PROT UR QL STRIP: ABNORMAL
RBC # BLD AUTO: 4.32 X10E6/UL (ref 3.77–5.28)
RBC #/AREA URNS HPF: ABNORMAL /HPF (ref 0–2)
SODIUM SERPL-SCNC: 142 MMOL/L (ref 134–144)
SP GR UR: 1.02 (ref 1–1.03)
TRIGL SERPL-MCNC: 89 MG/DL (ref 0–149)
TSH SERPL DL<=0.005 MIU/L-ACNC: 1.21 UIU/ML (ref 0.45–4.5)
UROBILINOGEN UR STRIP-MCNC: 0.2 MG/DL (ref 0.2–1)
VLDLC SERPL CALC-MCNC: 15 MG/DL (ref 5–40)
WBC # BLD AUTO: 5.8 X10E3/UL (ref 3.4–10.8)
WBC #/AREA URNS HPF: ABNORMAL /HPF (ref 0–5)

## 2021-11-26 NOTE — PROGRESS NOTES
Pls call - need repeat urine to make sure no cancer. High cholesterol - need low fat diet & exercise. Some blood in urine - need recheck urine in 1-2 mos. Normal thyroid.

## 2021-11-29 DIAGNOSIS — R31.9 HEMATURIA, UNSPECIFIED TYPE: Primary | ICD-10-CM

## 2021-12-02 ENCOUNTER — DOCUMENTATION (OUTPATIENT)
Dept: INTERNAL MEDICINE | Facility: CLINIC | Age: 81
End: 2021-12-02

## 2021-12-02 DIAGNOSIS — R31.9 HEMATURIA, UNSPECIFIED TYPE: Primary | ICD-10-CM

## 2021-12-07 ENCOUNTER — TELEPHONE (OUTPATIENT)
Dept: INTERNAL MEDICINE | Facility: CLINIC | Age: 81
End: 2021-12-07

## 2021-12-07 NOTE — TELEPHONE ENCOUNTER
----- Message from Marisol Broussard MD sent at 12/2/2021 11:30 AM EST -----  I asked her to come in for urine test - she needs to tell us when she is coming in (ordered).  But she needs much more water intake on day of urine test.

## 2021-12-07 NOTE — TELEPHONE ENCOUNTER
I spoke with the pt and she is planning on getting it done at [facility] next week

## 2021-12-08 ENCOUNTER — LAB (OUTPATIENT)
Dept: LAB | Facility: HOSPITAL | Age: 81
End: 2021-12-08

## 2021-12-08 DIAGNOSIS — R31.9 HEMATURIA, UNSPECIFIED TYPE: ICD-10-CM

## 2021-12-08 LAB
BACTERIA UR QL AUTO: ABNORMAL /HPF
BILIRUB UR QL STRIP: NEGATIVE
CLARITY UR: CLEAR
COLOR UR: YELLOW
GLUCOSE UR STRIP-MCNC: NEGATIVE MG/DL
HGB UR QL STRIP.AUTO: ABNORMAL
HYALINE CASTS UR QL AUTO: ABNORMAL /LPF
KETONES UR QL STRIP: NEGATIVE
LEUKOCYTE ESTERASE UR QL STRIP.AUTO: ABNORMAL
NITRITE UR QL STRIP: NEGATIVE
PH UR STRIP.AUTO: 5.5 [PH] (ref 5–8)
PROT UR QL STRIP: NEGATIVE
RBC # UR STRIP: ABNORMAL /HPF
REF LAB TEST METHOD: ABNORMAL
SP GR UR STRIP: 1.01 (ref 1–1.03)
SQUAMOUS #/AREA URNS HPF: ABNORMAL /HPF
UROBILINOGEN UR QL STRIP: ABNORMAL
WBC # UR STRIP: ABNORMAL /HPF

## 2021-12-08 PROCEDURE — 81001 URINALYSIS AUTO W/SCOPE: CPT

## 2021-12-09 RX ORDER — LEVOTHYROXINE SODIUM 0.05 MG/1
50 TABLET ORAL DAILY
Qty: 90 TABLET | Refills: 0 | Status: SHIPPED | OUTPATIENT
Start: 2021-12-09 | End: 2022-01-05 | Stop reason: SDUPTHER

## 2021-12-26 RX ORDER — CITALOPRAM 20 MG/1
20 TABLET ORAL DAILY
Qty: 90 TABLET | Refills: 1 | Status: CANCELLED | OUTPATIENT
Start: 2021-12-26

## 2021-12-27 DIAGNOSIS — F39 MOOD DISORDER (HCC): Primary | ICD-10-CM

## 2021-12-27 RX ORDER — CITALOPRAM 20 MG/1
20 TABLET ORAL DAILY
Qty: 90 TABLET | Refills: 0 | Status: SHIPPED | OUTPATIENT
Start: 2021-12-27 | End: 2022-01-05 | Stop reason: SDUPTHER

## 2022-01-05 ENCOUNTER — OFFICE VISIT (OUTPATIENT)
Dept: INTERNAL MEDICINE | Facility: CLINIC | Age: 82
End: 2022-01-05

## 2022-01-05 VITALS
OXYGEN SATURATION: 95 % | TEMPERATURE: 97.7 F | BODY MASS INDEX: 25.86 KG/M2 | HEIGHT: 61 IN | DIASTOLIC BLOOD PRESSURE: 80 MMHG | HEART RATE: 77 BPM | SYSTOLIC BLOOD PRESSURE: 124 MMHG | WEIGHT: 137 LBS

## 2022-01-05 DIAGNOSIS — E03.9 ACQUIRED HYPOTHYROIDISM: ICD-10-CM

## 2022-01-05 DIAGNOSIS — I10 PRIMARY HYPERTENSION: Primary | ICD-10-CM

## 2022-01-05 DIAGNOSIS — F41.9 ANXIETY: ICD-10-CM

## 2022-01-05 DIAGNOSIS — R31.29 MICROSCOPIC HEMATURIA: ICD-10-CM

## 2022-01-05 PROCEDURE — 99214 OFFICE O/P EST MOD 30 MIN: CPT | Performed by: FAMILY MEDICINE

## 2022-01-05 RX ORDER — CITALOPRAM 20 MG/1
20 TABLET ORAL DAILY
Qty: 90 TABLET | Refills: 1 | Status: SHIPPED | OUTPATIENT
Start: 2022-01-05 | End: 2022-01-09

## 2022-01-05 RX ORDER — LOSARTAN POTASSIUM 100 MG/1
100 TABLET ORAL DAILY
Qty: 90 TABLET | Refills: 1 | Status: SHIPPED | OUTPATIENT
Start: 2022-01-05 | End: 2022-01-09

## 2022-01-05 RX ORDER — LEVOTHYROXINE SODIUM 0.05 MG/1
50 TABLET ORAL DAILY
Qty: 90 TABLET | Refills: 1 | Status: SHIPPED | OUTPATIENT
Start: 2022-01-05 | End: 2022-01-09

## 2022-01-05 RX ORDER — HYDROCHLOROTHIAZIDE 12.5 MG/1
12.5 TABLET ORAL DAILY
Qty: 90 TABLET | Refills: 1 | Status: SHIPPED | OUTPATIENT
Start: 2022-01-05 | End: 2022-01-05 | Stop reason: SDUPTHER

## 2022-01-05 RX ORDER — HYDROCHLOROTHIAZIDE 12.5 MG/1
12.5 TABLET ORAL DAILY
Qty: 90 TABLET | Refills: 1 | Status: SHIPPED | OUTPATIENT
Start: 2022-01-05 | End: 2022-01-09

## 2022-01-05 RX ORDER — LOSARTAN POTASSIUM 100 MG/1
100 TABLET ORAL DAILY
Qty: 90 TABLET | Refills: 1 | Status: SHIPPED | OUTPATIENT
Start: 2022-01-05 | End: 2022-01-05 | Stop reason: SDUPTHER

## 2022-01-05 NOTE — PROGRESS NOTES
Subjective   Kristie Franz is a 81 y.o. female.   Chief Complaint   Patient presents with   • Hypertension   • Hypothyroidism   • Anxiety       History of Present Illness     This is a new patient in our office.      1.  Hypertension-patient is on losartan 100 mg a day and HCTZ 12.5 mg a day.  Medications were adjusted in November 2021 by her previous doctor.  She was on Maxide, but was urinating a lot and that was the reason for the change.  Patient was advised to take losartan every day and HCTZ on an as-needed basis, but her blood pressure was going up, it was in 160s to 170s over 90s.  She started taking HCTZ every day together with losartan and blood pressure gradually got better.  Today it is 124/80.  Yesterday at home it was 121/83 on her daughter's machine and 136/90 on her own machine.  She has no chest pain, no shortness of breath, no lightheadedness.  No personal cardiac history.  She is not a smoker.  She never did.  She tried a few cigarettes, but never smoked on a regular basis.  She exercises 2-3 times a week with silver sneakers.  She enjoys it.    2.  Anxiety- she was diagnosed more than 10 years ago.  She is on citalopram 20 mg a day.  She takes it every day.  She has no side effects.  No suicidal ideation, no aggressive behaviors.  It helps her.  She tried to discontinue it, but anxiety was getting worse and she had to restart it.  At this dose her mood is normal, she has no excessive worries.  PHQ-9 at 2, BISHNU-7 at 2.    3.  Hypothyroidism-she has no history of thyroid surgery.  She is on levothyroxine 50 mcg a day.  She takes it every day on empty stomach and does not eat for at least 30 minutes after taking it.  TSH in November 2021 was at 1.2.    4.  Microscopic hematuria-she has never had gross hematuria, but  last few times her urine was positive for red blood cells.  She has no dysuria, no frequency, no other symptoms associated.  She was never evaluated by urologist.    She had Covid  vaccine x3.  She had flu vaccine this season.  Shingrix vaccine x2.  Pneumonia vaccine x2.  She had Cologuard on 11/13/2019.    The following portions of the patient's history were reviewed and updated as appropriate: allergies, current medications, past family history, past medical history, past social history, past surgical history and problem list.    Review of Systems   Constitutional: Negative.    Respiratory: Negative for shortness of breath.    Cardiovascular: Negative for chest pain.   Neurological: Negative for light-headedness.   Psychiatric/Behavioral: Negative for suicidal ideas. The patient is not nervous/anxious.          Objective   Wt Readings from Last 3 Encounters:   01/05/22 62.1 kg (137 lb)   11/23/21 62.6 kg (138 lb)   05/10/21 63 kg (139 lb)      Vitals:    01/05/22 0854   BP: 124/80   Pulse: 77   Temp: 97.7 °F (36.5 °C)   SpO2: 95%     Temp Readings from Last 3 Encounters:   01/05/22 97.7 °F (36.5 °C)   01/04/22 97.3 °F (36.3 °C) (Temporal)   11/23/21 97.1 °F (36.2 °C)     BP Readings from Last 3 Encounters:   01/05/22 124/80   01/04/22 156/92   11/23/21 124/80     Pulse Readings from Last 3 Encounters:   01/05/22 77   01/04/22 73   11/23/21 55     Body mass index is 25.8 kg/m².    Physical Exam  Constitutional:       Appearance: Normal appearance. She is well-developed.   HENT:      Head: Normocephalic and atraumatic.   Neck:      Thyroid: No thyromegaly.      Vascular: No carotid bruit.   Cardiovascular:      Rate and Rhythm: Normal rate and regular rhythm.      Heart sounds: Normal heart sounds.   Pulmonary:      Effort: Pulmonary effort is normal.      Breath sounds: Normal breath sounds.   Musculoskeletal:      Cervical back: Neck supple.   Skin:     General: Skin is warm and dry.   Neurological:      Mental Status: She is alert and oriented to person, place, and time.   Psychiatric:         Behavior: Behavior normal.         Assessment/Plan   Diagnoses and all orders for this  visit:    1. Primary hypertension (Primary)  -     Basic Metabolic Panel    2. Acquired hypothyroidism    3. Anxiety    4. Microscopic hematuria  -     Ambulatory Referral to Urology    Other orders  -     citalopram (CeleXA) 20 MG tablet; Take 1 tablet by mouth Daily.  Dispense: 90 tablet; Refill: 1  -     hydroCHLOROthiazide (HYDRODIURIL) 12.5 MG tablet; Take 1 tablet by mouth Daily.  Dispense: 90 tablet; Refill: 1  -     losartan (Cozaar) 100 MG tablet; Take 1 tablet by mouth Daily.  Dispense: 90 tablet; Refill: 1  -     levothyroxine (SYNTHROID, LEVOTHROID) 50 MCG tablet; Take 1 tablet by mouth Daily.  Dispense: 90 tablet; Refill: 1        #1 hypertension-we will continue current treatment with losartan 100 mg a day and HCTZ 12.5 mg a day.  We are checking BMP today.  Follow-up in 6 months.  2.  Anxiety -we will continue current treatment.  Follow-up in 6 months.  3.  Hypothyroidism-continue current treatment.  Follow-up in 6 months.  4.  Microscopic hematuria-referral to urologist.

## 2022-01-06 LAB
BUN SERPL-MCNC: 19 MG/DL (ref 8–27)
BUN/CREAT SERPL: 22 (ref 12–28)
CALCIUM SERPL-MCNC: 9.5 MG/DL (ref 8.7–10.3)
CHLORIDE SERPL-SCNC: 101 MMOL/L (ref 96–106)
CO2 SERPL-SCNC: 26 MMOL/L (ref 20–29)
CREAT SERPL-MCNC: 0.85 MG/DL (ref 0.57–1)
GLUCOSE SERPL-MCNC: 85 MG/DL (ref 65–99)
POTASSIUM SERPL-SCNC: 4.3 MMOL/L (ref 3.5–5.2)
SODIUM SERPL-SCNC: 140 MMOL/L (ref 134–144)

## 2022-01-09 ENCOUNTER — HOSPITAL ENCOUNTER (OUTPATIENT)
Facility: HOSPITAL | Age: 82
Setting detail: OBSERVATION
Discharge: HOME | End: 2022-01-11
Attending: EMERGENCY MEDICINE | Admitting: HOSPITALIST

## 2022-01-09 ENCOUNTER — APPOINTMENT (OUTPATIENT)
Dept: CT IMAGING | Facility: HOSPITAL | Age: 82
End: 2022-01-09

## 2022-01-09 ENCOUNTER — APPOINTMENT (OUTPATIENT)
Dept: GENERAL RADIOLOGY | Facility: HOSPITAL | Age: 82
End: 2022-01-09
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
## 2022-01-09 DIAGNOSIS — S22.20XA CLOSED FRACTURE OF STERNUM, UNSPECIFIED PORTION OF STERNUM, INITIAL ENCOUNTER: ICD-10-CM

## 2022-01-09 DIAGNOSIS — R52 INTRACTABLE PAIN: ICD-10-CM

## 2022-01-09 DIAGNOSIS — S22.22XD CLOSED FRACTURE OF BODY OF STERNUM WITH ROUTINE HEALING, SUBSEQUENT ENCOUNTER: ICD-10-CM

## 2022-01-09 LAB
ALBUMIN SERPL-MCNC: 4.3 G/DL (ref 3.5–5.2)
ALBUMIN/GLOB SERPL: 1.8 G/DL
ALP SERPL-CCNC: 70 U/L (ref 39–117)
ALT SERPL W P-5'-P-CCNC: 15 U/L (ref 1–33)
ANION GAP SERPL CALCULATED.3IONS-SCNC: 10.9 MMOL/L (ref 5–15)
AST SERPL-CCNC: 23 U/L (ref 1–32)
BASOPHILS # BLD AUTO: 0.06 10*3/MM3 (ref 0–0.2)
BASOPHILS NFR BLD AUTO: 0.6 % (ref 0–1.5)
BILIRUB SERPL-MCNC: 0.8 MG/DL (ref 0–1.2)
BUN SERPL-MCNC: 15 MG/DL (ref 8–23)
BUN/CREAT SERPL: 20.3 (ref 7–25)
CALCIUM SPEC-SCNC: 9.2 MG/DL (ref 8.6–10.5)
CHLORIDE SERPL-SCNC: 99 MMOL/L (ref 98–107)
CO2 SERPL-SCNC: 27.1 MMOL/L (ref 22–29)
CREAT SERPL-MCNC: 0.74 MG/DL (ref 0.57–1)
DEPRECATED RDW RBC AUTO: 42.2 FL (ref 37–54)
EOSINOPHIL # BLD AUTO: 0.1 10*3/MM3 (ref 0–0.4)
EOSINOPHIL NFR BLD AUTO: 1.1 % (ref 0.3–6.2)
ERYTHROCYTE [DISTWIDTH] IN BLOOD BY AUTOMATED COUNT: 12.1 % (ref 12.3–15.4)
GFR SERPL CREATININE-BSD FRML MDRD: 75 ML/MIN/1.73
GLOBULIN UR ELPH-MCNC: 2.4 GM/DL
GLUCOSE SERPL-MCNC: 104 MG/DL (ref 65–99)
HCT VFR BLD AUTO: 42.2 % (ref 34–46.6)
HGB BLD-MCNC: 13.8 G/DL (ref 12–15.9)
HOLD SPECIMEN: NORMAL
HOLD SPECIMEN: NORMAL
IMM GRANULOCYTES # BLD AUTO: 0.05 10*3/MM3 (ref 0–0.05)
IMM GRANULOCYTES NFR BLD AUTO: 0.5 % (ref 0–0.5)
LYMPHOCYTES # BLD AUTO: 1.31 10*3/MM3 (ref 0.7–3.1)
LYMPHOCYTES NFR BLD AUTO: 14.2 % (ref 19.6–45.3)
MCH RBC QN AUTO: 31 PG (ref 26.6–33)
MCHC RBC AUTO-ENTMCNC: 32.7 G/DL (ref 31.5–35.7)
MCV RBC AUTO: 94.8 FL (ref 79–97)
MONOCYTES # BLD AUTO: 0.62 10*3/MM3 (ref 0.1–0.9)
MONOCYTES NFR BLD AUTO: 6.7 % (ref 5–12)
NEUTROPHILS NFR BLD AUTO: 7.1 10*3/MM3 (ref 1.7–7)
NEUTROPHILS NFR BLD AUTO: 76.9 % (ref 42.7–76)
NRBC BLD AUTO-RTO: 0 /100 WBC (ref 0–0.2)
PLATELET # BLD AUTO: 212 10*3/MM3 (ref 140–450)
PMV BLD AUTO: 11.3 FL (ref 6–12)
POTASSIUM SERPL-SCNC: 4.1 MMOL/L (ref 3.5–5.2)
PROT SERPL-MCNC: 6.7 G/DL (ref 6–8.5)
QT INTERVAL: 469 MS
RBC # BLD AUTO: 4.45 10*6/MM3 (ref 3.77–5.28)
SARS-COV-2 RNA RESP QL NAA+PROBE: NOT DETECTED
SODIUM SERPL-SCNC: 137 MMOL/L (ref 136–145)
TROPONIN T SERPL-MCNC: <0.01 NG/ML (ref 0–0.03)
WBC NRBC COR # BLD: 9.24 10*3/MM3 (ref 3.4–10.8)
WHOLE BLOOD HOLD SPECIMEN: NORMAL
WHOLE BLOOD HOLD SPECIMEN: NORMAL

## 2022-01-09 PROCEDURE — 74177 CT ABD & PELVIS W/CONTRAST: CPT

## 2022-01-09 PROCEDURE — 25010000002 MORPHINE PER 10 MG: Performed by: EMERGENCY MEDICINE

## 2022-01-09 PROCEDURE — G0378 HOSPITAL OBSERVATION PER HR: HCPCS

## 2022-01-09 PROCEDURE — 71275 CT ANGIOGRAPHY CHEST: CPT

## 2022-01-09 PROCEDURE — 0 IOPAMIDOL PER 1 ML: Performed by: EMERGENCY MEDICINE

## 2022-01-09 PROCEDURE — 25010000002 HYDROMORPHONE PER 4 MG: Performed by: EMERGENCY MEDICINE

## 2022-01-09 PROCEDURE — 93005 ELECTROCARDIOGRAM TRACING: CPT | Performed by: EMERGENCY MEDICINE

## 2022-01-09 PROCEDURE — 99285 EMERGENCY DEPT VISIT HI MDM: CPT

## 2022-01-09 PROCEDURE — 93010 ELECTROCARDIOGRAM REPORT: CPT | Performed by: INTERNAL MEDICINE

## 2022-01-09 PROCEDURE — C9803 HOPD COVID-19 SPEC COLLECT: HCPCS

## 2022-01-09 PROCEDURE — 84484 ASSAY OF TROPONIN QUANT: CPT | Performed by: EMERGENCY MEDICINE

## 2022-01-09 PROCEDURE — 96374 THER/PROPH/DIAG INJ IV PUSH: CPT

## 2022-01-09 PROCEDURE — 76377 3D RENDER W/INTRP POSTPROCES: CPT

## 2022-01-09 PROCEDURE — U0005 INFEC AGEN DETEC AMPLI PROBE: HCPCS | Performed by: PHYSICIAN ASSISTANT

## 2022-01-09 PROCEDURE — 80053 COMPREHEN METABOLIC PANEL: CPT | Performed by: EMERGENCY MEDICINE

## 2022-01-09 PROCEDURE — 71045 X-RAY EXAM CHEST 1 VIEW: CPT

## 2022-01-09 PROCEDURE — 70450 CT HEAD/BRAIN W/O DYE: CPT

## 2022-01-09 PROCEDURE — 25010000002 ONDANSETRON PER 1 MG: Performed by: EMERGENCY MEDICINE

## 2022-01-09 PROCEDURE — 93005 ELECTROCARDIOGRAM TRACING: CPT

## 2022-01-09 PROCEDURE — 96375 TX/PRO/DX INJ NEW DRUG ADDON: CPT

## 2022-01-09 PROCEDURE — U0003 INFECTIOUS AGENT DETECTION BY NUCLEIC ACID (DNA OR RNA); SEVERE ACUTE RESPIRATORY SYNDROME CORONAVIRUS 2 (SARS-COV-2) (CORONAVIRUS DISEASE [COVID-19]), AMPLIFIED PROBE TECHNIQUE, MAKING USE OF HIGH THROUGHPUT TECHNOLOGIES AS DESCRIBED BY CMS-2020-01-R: HCPCS | Performed by: PHYSICIAN ASSISTANT

## 2022-01-09 PROCEDURE — 85025 COMPLETE CBC W/AUTO DIFF WBC: CPT

## 2022-01-09 PROCEDURE — 72125 CT NECK SPINE W/O DYE: CPT

## 2022-01-09 RX ORDER — LEVOTHYROXINE SODIUM 0.05 MG/1
50 TABLET ORAL DAILY
COMMUNITY
End: 2022-09-08

## 2022-01-09 RX ORDER — LIDOCAINE 50 MG/G
1 PATCH TOPICAL ONCE
Status: COMPLETED | OUTPATIENT
Start: 2022-01-09 | End: 2022-01-10

## 2022-01-09 RX ORDER — CHOLECALCIFEROL (VITAMIN D3) 125 MCG
5 CAPSULE ORAL NIGHTLY PRN
Status: DISCONTINUED | OUTPATIENT
Start: 2022-01-09 | End: 2022-01-11 | Stop reason: HOSPADM

## 2022-01-09 RX ORDER — LOSARTAN POTASSIUM 100 MG/1
100 TABLET ORAL DAILY
Status: DISCONTINUED | OUTPATIENT
Start: 2022-01-10 | End: 2022-01-11 | Stop reason: HOSPADM

## 2022-01-09 RX ORDER — MULTIPLE VITAMINS W/ MINERALS TAB 9MG-400MCG
1 TAB ORAL DAILY
COMMUNITY
End: 2022-03-21

## 2022-01-09 RX ORDER — ONDANSETRON 2 MG/ML
4 INJECTION INTRAMUSCULAR; INTRAVENOUS ONCE
Status: COMPLETED | OUTPATIENT
Start: 2022-01-09 | End: 2022-01-09

## 2022-01-09 RX ORDER — ACETAMINOPHEN 325 MG/1
650 TABLET ORAL EVERY 4 HOURS PRN
Status: DISCONTINUED | OUTPATIENT
Start: 2022-01-09 | End: 2022-01-10

## 2022-01-09 RX ORDER — AMOXICILLIN 250 MG
2 CAPSULE ORAL 2 TIMES DAILY
Status: DISCONTINUED | OUTPATIENT
Start: 2022-01-09 | End: 2022-01-11 | Stop reason: HOSPADM

## 2022-01-09 RX ORDER — MELATONIN
1000 DAILY
Status: DISCONTINUED | OUTPATIENT
Start: 2022-01-10 | End: 2022-01-11 | Stop reason: HOSPADM

## 2022-01-09 RX ORDER — ONDANSETRON 2 MG/ML
4 INJECTION INTRAMUSCULAR; INTRAVENOUS EVERY 6 HOURS PRN
Status: DISCONTINUED | OUTPATIENT
Start: 2022-01-09 | End: 2022-01-11 | Stop reason: HOSPADM

## 2022-01-09 RX ORDER — HYDROMORPHONE HYDROCHLORIDE 1 MG/ML
0.5 INJECTION, SOLUTION INTRAMUSCULAR; INTRAVENOUS; SUBCUTANEOUS ONCE
Status: COMPLETED | OUTPATIENT
Start: 2022-01-09 | End: 2022-01-09

## 2022-01-09 RX ORDER — MELATONIN
1000 DAILY
COMMUNITY
End: 2022-07-05

## 2022-01-09 RX ORDER — CITALOPRAM 20 MG/1
20 TABLET ORAL NIGHTLY
Status: DISCONTINUED | OUTPATIENT
Start: 2022-01-09 | End: 2022-01-11 | Stop reason: HOSPADM

## 2022-01-09 RX ORDER — ACETAMINOPHEN 160 MG/5ML
650 SOLUTION ORAL EVERY 4 HOURS PRN
Status: DISCONTINUED | OUTPATIENT
Start: 2022-01-09 | End: 2022-01-10

## 2022-01-09 RX ORDER — MULTIPLE VITAMINS W/ MINERALS TAB 9MG-400MCG
1 TAB ORAL DAILY
Status: DISCONTINUED | OUTPATIENT
Start: 2022-01-10 | End: 2022-01-11 | Stop reason: HOSPADM

## 2022-01-09 RX ORDER — LOSARTAN POTASSIUM 100 MG/1
100 TABLET ORAL DAILY
COMMUNITY
End: 2022-07-05 | Stop reason: SDUPTHER

## 2022-01-09 RX ORDER — MORPHINE SULFATE 2 MG/ML
4 INJECTION, SOLUTION INTRAMUSCULAR; INTRAVENOUS ONCE
Status: COMPLETED | OUTPATIENT
Start: 2022-01-09 | End: 2022-01-09

## 2022-01-09 RX ORDER — CITALOPRAM 20 MG/1
5 TABLET ORAL NIGHTLY
COMMUNITY
End: 2022-10-12

## 2022-01-09 RX ORDER — ONDANSETRON 4 MG/1
4 TABLET, FILM COATED ORAL EVERY 6 HOURS PRN
Status: DISCONTINUED | OUTPATIENT
Start: 2022-01-09 | End: 2022-01-11 | Stop reason: HOSPADM

## 2022-01-09 RX ORDER — HYDROCHLOROTHIAZIDE 12.5 MG/1
12.5 CAPSULE, GELATIN COATED ORAL DAILY
Status: DISCONTINUED | OUTPATIENT
Start: 2022-01-10 | End: 2022-01-11 | Stop reason: HOSPADM

## 2022-01-09 RX ORDER — HYDROCHLOROTHIAZIDE 12.5 MG/1
12.5 CAPSULE, GELATIN COATED ORAL DAILY
COMMUNITY
End: 2022-01-21

## 2022-01-09 RX ORDER — BISACODYL 5 MG/1
5 TABLET, DELAYED RELEASE ORAL DAILY PRN
Status: DISCONTINUED | OUTPATIENT
Start: 2022-01-09 | End: 2022-01-11 | Stop reason: HOSPADM

## 2022-01-09 RX ORDER — POLYETHYLENE GLYCOL 3350 17 G/17G
17 POWDER, FOR SOLUTION ORAL DAILY PRN
Status: DISCONTINUED | OUTPATIENT
Start: 2022-01-09 | End: 2022-01-11 | Stop reason: HOSPADM

## 2022-01-09 RX ORDER — ACETAMINOPHEN 650 MG/1
650 SUPPOSITORY RECTAL EVERY 4 HOURS PRN
Status: DISCONTINUED | OUTPATIENT
Start: 2022-01-09 | End: 2022-01-10

## 2022-01-09 RX ORDER — HYDROCODONE BITARTRATE AND ACETAMINOPHEN 5; 325 MG/1; MG/1
1 TABLET ORAL EVERY 4 HOURS PRN
Status: DISCONTINUED | OUTPATIENT
Start: 2022-01-09 | End: 2022-01-10

## 2022-01-09 RX ORDER — SODIUM CHLORIDE 0.9 % (FLUSH) 0.9 %
10 SYRINGE (ML) INJECTION EVERY 12 HOURS SCHEDULED
Status: DISCONTINUED | OUTPATIENT
Start: 2022-01-09 | End: 2022-01-11 | Stop reason: HOSPADM

## 2022-01-09 RX ORDER — BISACODYL 10 MG
10 SUPPOSITORY, RECTAL RECTAL DAILY PRN
Status: DISCONTINUED | OUTPATIENT
Start: 2022-01-09 | End: 2022-01-11 | Stop reason: HOSPADM

## 2022-01-09 RX ORDER — SODIUM CHLORIDE 0.9 % (FLUSH) 0.9 %
10 SYRINGE (ML) INJECTION AS NEEDED
Status: DISCONTINUED | OUTPATIENT
Start: 2022-01-09 | End: 2022-01-11 | Stop reason: HOSPADM

## 2022-01-09 RX ORDER — LEVOTHYROXINE SODIUM 0.05 MG/1
50 TABLET ORAL
Status: DISCONTINUED | OUTPATIENT
Start: 2022-01-10 | End: 2022-01-11 | Stop reason: HOSPADM

## 2022-01-09 RX ADMIN — CITALOPRAM 20 MG: 20 TABLET, FILM COATED ORAL at 22:49

## 2022-01-09 RX ADMIN — ONDANSETRON 4 MG: 2 INJECTION INTRAMUSCULAR; INTRAVENOUS at 16:09

## 2022-01-09 RX ADMIN — MORPHINE SULFATE 4 MG: 2 INJECTION, SOLUTION INTRAMUSCULAR; INTRAVENOUS at 16:09

## 2022-01-09 RX ADMIN — HYDROMORPHONE HYDROCHLORIDE 0.5 MG: 1 INJECTION, SOLUTION INTRAMUSCULAR; INTRAVENOUS; SUBCUTANEOUS at 17:43

## 2022-01-09 RX ADMIN — IOPAMIDOL 95 ML: 755 INJECTION, SOLUTION INTRAVENOUS at 17:00

## 2022-01-09 RX ADMIN — HYDROCODONE BITARTRATE AND ACETAMINOPHEN 1 TABLET: 5; 325 TABLET ORAL at 22:49

## 2022-01-09 RX ADMIN — SODIUM CHLORIDE, PRESERVATIVE FREE 10 ML: 5 INJECTION INTRAVENOUS at 22:53

## 2022-01-09 RX ADMIN — LIDOCAINE 1 PATCH: 50 PATCH TOPICAL at 19:05
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
## 2022-01-10 ENCOUNTER — APPOINTMENT (OUTPATIENT)
Dept: CARDIOLOGY | Facility: HOSPITAL | Age: 82
End: 2022-01-10

## 2022-01-10 PROBLEM — S22.20XA CLOSED FRACTURE OF STERNUM: Status: ACTIVE | Noted: 2022-01-10

## 2022-01-10 PROBLEM — J98.11 ATELECTASIS: Status: ACTIVE | Noted: 2022-01-10

## 2022-01-10 LAB
ANION GAP SERPL CALCULATED.3IONS-SCNC: 8.2 MMOL/L (ref 5–15)
ASCENDING AORTA: 3.5 CM
BASOPHILS # BLD AUTO: 0.03 10*3/MM3 (ref 0–0.2)
BASOPHILS NFR BLD AUTO: 0.5 % (ref 0–1.5)
BH CV ECHO MEAS - ACS: 2 CM
BH CV ECHO MEAS - AI DEC SLOPE: 336.2 CM/SEC^2
BH CV ECHO MEAS - AI MAX PG: 105.1 MMHG
BH CV ECHO MEAS - AI MAX VEL: 512.5 CM/SEC
BH CV ECHO MEAS - AI P1/2T: 446.5 MSEC
BH CV ECHO MEAS - AO MAX PG (FULL): 8 MMHG
BH CV ECHO MEAS - AO MAX PG: 13.6 MMHG
BH CV ECHO MEAS - AO MEAN PG (FULL): 3.1 MMHG
BH CV ECHO MEAS - AO MEAN PG: 6.2 MMHG
BH CV ECHO MEAS - AO ROOT AREA (BSA CORRECTED): 1.7
BH CV ECHO MEAS - AO ROOT AREA: 5.7 CM^2
BH CV ECHO MEAS - AO ROOT DIAM: 2.7 CM
BH CV ECHO MEAS - AO V2 MAX: 184.6 CM/SEC
BH CV ECHO MEAS - AO V2 MEAN: 113.3 CM/SEC
BH CV ECHO MEAS - AO V2 VTI: 32.6 CM
BH CV ECHO MEAS - ASC AORTA: 3.5 CM
BH CV ECHO MEAS - BSA(HAYCOCK): 1.7 M^2
BH CV ECHO MEAS - BSA: 1.6 M^2
BH CV ECHO MEAS - BZI_BMI: 26.5 KILOGRAMS/M^2
BH CV ECHO MEAS - BZI_METRIC_HEIGHT: 154.9 CM
BH CV ECHO MEAS - BZI_METRIC_WEIGHT: 63.5 KG
BH CV ECHO MEAS - EDV(CUBED): 44.7 ML
BH CV ECHO MEAS - EDV(MOD-SP2): 22 ML
BH CV ECHO MEAS - EDV(MOD-SP4): 25 ML
BH CV ECHO MEAS - EDV(TEICH): 52.6 ML
BH CV ECHO MEAS - EF(CUBED): 72.5 %
BH CV ECHO MEAS - EF(MOD-BP): 59.6 %
BH CV ECHO MEAS - EF(MOD-SP2): 54.5 %
BH CV ECHO MEAS - EF(MOD-SP4): 64 %
BH CV ECHO MEAS - EF(TEICH): 65.2 %
BH CV ECHO MEAS - ESV(CUBED): 12.3 ML
BH CV ECHO MEAS - ESV(MOD-SP2): 10 ML
BH CV ECHO MEAS - ESV(MOD-SP4): 9 ML
BH CV ECHO MEAS - ESV(TEICH): 18.3 ML
BH CV ECHO MEAS - FS: 35 %
BH CV ECHO MEAS - IVS/LVPW: 0.86
BH CV ECHO MEAS - IVSD: 1.2 CM
BH CV ECHO MEAS - LA DIMENSION: 2.5 CM
BH CV ECHO MEAS - LA/AO: 0.94
BH CV ECHO MEAS - LAT PEAK E' VEL: 8.6 CM/SEC
BH CV ECHO MEAS - LV DIASTOLIC VOL/BSA (35-75): 15.4 ML/M^2
BH CV ECHO MEAS - LV MASS(C)D: 150.8 GRAMS
BH CV ECHO MEAS - LV MASS(C)DI: 92.9 GRAMS/M^2
BH CV ECHO MEAS - LV MAX PG: 5.7 MMHG
BH CV ECHO MEAS - LV MEAN PG: 3.1 MMHG
BH CV ECHO MEAS - LV SYSTOLIC VOL/BSA (12-30): 5.5 ML/M^2
BH CV ECHO MEAS - LV V1 MAX: 119.1 CM/SEC
BH CV ECHO MEAS - LV V1 MEAN: 85.3 CM/SEC
BH CV ECHO MEAS - LV V1 VTI: 25.4 CM
BH CV ECHO MEAS - LVIDD: 3.6 CM
BH CV ECHO MEAS - LVIDS: 2.3 CM
BH CV ECHO MEAS - LVLD AP2: 5.8 CM
BH CV ECHO MEAS - LVLD AP4: 5.8 CM
BH CV ECHO MEAS - LVLS AP2: 5.3 CM
BH CV ECHO MEAS - LVLS AP4: 4.7 CM
BH CV ECHO MEAS - LVPWD: 1.4 CM
BH CV ECHO MEAS - MED PEAK E' VEL: 4.9 CM/SEC
BH CV ECHO MEAS - MV A MAX VEL: 88.3 CM/SEC
BH CV ECHO MEAS - MV DEC SLOPE: 192.8 CM/SEC^2
BH CV ECHO MEAS - MV DEC TIME: 285 SEC
BH CV ECHO MEAS - MV E MAX VEL: 53.1 CM/SEC
BH CV ECHO MEAS - MV E/A: 0.6
BH CV ECHO MEAS - MV MAX PG: 3.4 MMHG
BH CV ECHO MEAS - MV MEAN PG: 1.2 MMHG
BH CV ECHO MEAS - MV P1/2T MAX VEL: 67.9 CM/SEC
BH CV ECHO MEAS - MV P1/2T: 103.1 MSEC
BH CV ECHO MEAS - MV V2 MAX: 92.3 CM/SEC
BH CV ECHO MEAS - MV V2 MEAN: 49.7 CM/SEC
BH CV ECHO MEAS - MV V2 VTI: 25.2 CM
BH CV ECHO MEAS - MVA P1/2T LCG: 3.2 CM^2
BH CV ECHO MEAS - MVA(P1/2T): 2.1 CM^2
BH CV ECHO MEAS - PA ACC TIME: 0.12 SEC
BH CV ECHO MEAS - PA MAX PG (FULL): 2.3 MMHG
BH CV ECHO MEAS - PA MAX PG: 4 MMHG
BH CV ECHO MEAS - PA PR(ACCEL): 23.1 MMHG
BH CV ECHO MEAS - PA V2 MAX: 100.4 CM/SEC
BH CV ECHO MEAS - PI END-D VEL: 77.4 CM/SEC
BH CV ECHO MEAS - RAP SYSTOLE: 3 MMHG
BH CV ECHO MEAS - RV MAX PG: 1.8 MMHG
BH CV ECHO MEAS - RV MEAN PG: 0.89 MMHG
BH CV ECHO MEAS - RV V1 MAX: 66.4 CM/SEC
BH CV ECHO MEAS - RV V1 MEAN: 44.4 CM/SEC
BH CV ECHO MEAS - RV V1 VTI: 15.5 CM
BH CV ECHO MEAS - RVSP: 34.9 MMHG
BH CV ECHO MEAS - SI(AO): 113.7 ML/M^2
BH CV ECHO MEAS - SI(CUBED): 20 ML/M^2
BH CV ECHO MEAS - SI(MOD-SP2): 7.4 ML/M^2
BH CV ECHO MEAS - SI(MOD-SP4): 9.9 ML/M^2
BH CV ECHO MEAS - SI(TEICH): 21.2 ML/M^2
BH CV ECHO MEAS - SV(AO): 184.7 ML
BH CV ECHO MEAS - SV(CUBED): 32.4 ML
BH CV ECHO MEAS - SV(MOD-SP2): 12 ML
BH CV ECHO MEAS - SV(MOD-SP4): 16 ML
BH CV ECHO MEAS - SV(TEICH): 34.3 ML
BH CV ECHO MEAS - TAPSE (>1.6): 1.2 CM
BH CV ECHO MEAS - TR MAX VEL: 282.3 CM/SEC
BH CV ECHO MEASUREMENTS AVERAGE E/E' RATIO: 7.87
BH CV XLRA - RV BASE: 2.3 CM
BH CV XLRA - RV LENGTH: 4.6 CM
BH CV XLRA - RV MID: 1.8 CM
BH CV XLRA - TDI S': 11.5 CM/SEC
BUN SERPL-MCNC: 12 MG/DL (ref 8–23)
BUN/CREAT SERPL: 16.7 (ref 7–25)
CALCIUM SPEC-SCNC: 9.1 MG/DL (ref 8.6–10.5)
CHLORIDE SERPL-SCNC: 99 MMOL/L (ref 98–107)
CO2 SERPL-SCNC: 27.8 MMOL/L (ref 22–29)
CREAT SERPL-MCNC: 0.72 MG/DL (ref 0.57–1)
DEPRECATED RDW RBC AUTO: 42.7 FL (ref 37–54)
EOSINOPHIL # BLD AUTO: 0.06 10*3/MM3 (ref 0–0.4)
EOSINOPHIL NFR BLD AUTO: 1 % (ref 0.3–6.2)
ERYTHROCYTE [DISTWIDTH] IN BLOOD BY AUTOMATED COUNT: 12.2 % (ref 12.3–15.4)
GFR SERPL CREATININE-BSD FRML MDRD: 78 ML/MIN/1.73
GLUCOSE SERPL-MCNC: 113 MG/DL (ref 65–99)
HCT VFR BLD AUTO: 38 % (ref 34–46.6)
HGB BLD-MCNC: 12.8 G/DL (ref 12–15.9)
IMM GRANULOCYTES # BLD AUTO: 0.01 10*3/MM3 (ref 0–0.05)
IMM GRANULOCYTES NFR BLD AUTO: 0.2 % (ref 0–0.5)
LEFT ATRIUM VOLUME INDEX: 13.2 ML/M2
LYMPHOCYTES # BLD AUTO: 1.47 10*3/MM3 (ref 0.7–3.1)
LYMPHOCYTES NFR BLD AUTO: 23.4 % (ref 19.6–45.3)
MAGNESIUM SERPL-MCNC: 2 MG/DL (ref 1.6–2.4)
MCH RBC QN AUTO: 31.7 PG (ref 26.6–33)
MCHC RBC AUTO-ENTMCNC: 33.7 G/DL (ref 31.5–35.7)
MCV RBC AUTO: 94.1 FL (ref 79–97)
MONOCYTES # BLD AUTO: 0.64 10*3/MM3 (ref 0.1–0.9)
MONOCYTES NFR BLD AUTO: 10.2 % (ref 5–12)
NEUTROPHILS NFR BLD AUTO: 4.07 10*3/MM3 (ref 1.7–7)
NEUTROPHILS NFR BLD AUTO: 64.7 % (ref 42.7–76)
NRBC BLD AUTO-RTO: 0 /100 WBC (ref 0–0.2)
PHOSPHATE SERPL-MCNC: 3.4 MG/DL (ref 2.5–4.5)
PLATELET # BLD AUTO: 188 10*3/MM3 (ref 140–450)
PMV BLD AUTO: 11.3 FL (ref 6–12)
POTASSIUM SERPL-SCNC: 3.9 MMOL/L (ref 3.5–5.2)
RBC # BLD AUTO: 4.04 10*6/MM3 (ref 3.77–5.28)
SINUS: 2.8 CM
SODIUM SERPL-SCNC: 135 MMOL/L (ref 136–145)
STJ: 2.8 CM
WBC NRBC COR # BLD: 6.28 10*3/MM3 (ref 3.4–10.8)

## 2022-01-10 PROCEDURE — 97530 THERAPEUTIC ACTIVITIES: CPT

## 2022-01-10 PROCEDURE — 84100 ASSAY OF PHOSPHORUS: CPT | Performed by: STUDENT IN AN ORGANIZED HEALTH CARE EDUCATION/TRAINING PROGRAM

## 2022-01-10 PROCEDURE — 80048 BASIC METABOLIC PNL TOTAL CA: CPT | Performed by: STUDENT IN AN ORGANIZED HEALTH CARE EDUCATION/TRAINING PROGRAM

## 2022-01-10 PROCEDURE — 83735 ASSAY OF MAGNESIUM: CPT | Performed by: STUDENT IN AN ORGANIZED HEALTH CARE EDUCATION/TRAINING PROGRAM

## 2022-01-10 PROCEDURE — 99213 OFFICE O/P EST LOW 20 MIN: CPT | Performed by: NURSE PRACTITIONER

## 2022-01-10 PROCEDURE — 85025 COMPLETE CBC W/AUTO DIFF WBC: CPT | Performed by: STUDENT IN AN ORGANIZED HEALTH CARE EDUCATION/TRAINING PROGRAM

## 2022-01-10 PROCEDURE — 97162 PT EVAL MOD COMPLEX 30 MIN: CPT

## 2022-01-10 PROCEDURE — G0378 HOSPITAL OBSERVATION PER HR: HCPCS

## 2022-01-10 PROCEDURE — 36415 COLL VENOUS BLD VENIPUNCTURE: CPT | Performed by: STUDENT IN AN ORGANIZED HEALTH CARE EDUCATION/TRAINING PROGRAM

## 2022-01-10 PROCEDURE — 93306 TTE W/DOPPLER COMPLETE: CPT | Performed by: INTERNAL MEDICINE

## 2022-01-10 PROCEDURE — 94799 UNLISTED PULMONARY SVC/PX: CPT

## 2022-01-10 PROCEDURE — 93306 TTE W/DOPPLER COMPLETE: CPT

## 2022-01-10 RX ORDER — OXYCODONE HYDROCHLORIDE 5 MG/1
5 TABLET ORAL EVERY 4 HOURS PRN
Status: DISCONTINUED | OUTPATIENT
Start: 2022-01-10 | End: 2022-01-11 | Stop reason: HOSPADM

## 2022-01-10 RX ORDER — LIDOCAINE 50 MG/G
1 PATCH TOPICAL
Status: DISCONTINUED | OUTPATIENT
Start: 2022-01-10 | End: 2022-01-11 | Stop reason: HOSPADM

## 2022-01-10 RX ORDER — HYDROMORPHONE HYDROCHLORIDE 1 MG/ML
0.5 INJECTION, SOLUTION INTRAMUSCULAR; INTRAVENOUS; SUBCUTANEOUS
Status: DISCONTINUED | OUTPATIENT
Start: 2022-01-10 | End: 2022-01-11 | Stop reason: HOSPADM

## 2022-01-10 RX ORDER — ACETAMINOPHEN 500 MG
1000 TABLET ORAL 3 TIMES DAILY
Status: DISCONTINUED | OUTPATIENT
Start: 2022-01-10 | End: 2022-01-11

## 2022-01-10 RX ORDER — CELECOXIB 100 MG/1
100 CAPSULE ORAL 2 TIMES DAILY
Status: DISCONTINUED | OUTPATIENT
Start: 2022-01-10 | End: 2022-01-11 | Stop reason: HOSPADM

## 2022-01-10 RX ORDER — GABAPENTIN 100 MG/1
100 CAPSULE ORAL 2 TIMES DAILY
Status: DISCONTINUED | OUTPATIENT
Start: 2022-01-10 | End: 2022-01-11 | Stop reason: HOSPADM

## 2022-01-10 RX ADMIN — SODIUM CHLORIDE, PRESERVATIVE FREE 10 ML: 5 INJECTION INTRAVENOUS at 22:19

## 2022-01-10 RX ADMIN — CELECOXIB 100 MG: 100 CAPSULE ORAL at 15:34

## 2022-01-10 RX ADMIN — CELECOXIB 100 MG: 100 CAPSULE ORAL at 22:18

## 2022-01-10 RX ADMIN — LEVOTHYROXINE SODIUM 50 MCG: 0.05 TABLET ORAL at 06:30

## 2022-01-10 RX ADMIN — CITALOPRAM 20 MG: 20 TABLET, FILM COATED ORAL at 22:18

## 2022-01-10 RX ADMIN — OXYCODONE HYDROCHLORIDE 5 MG: 5 TABLET ORAL at 10:41

## 2022-01-10 RX ADMIN — DOCUSATE SODIUM 50MG AND SENNOSIDES 8.6MG 2 TABLET: 8.6; 5 TABLET, FILM COATED ORAL at 22:18

## 2022-01-10 RX ADMIN — ACETAMINOPHEN 1000 MG: 500 TABLET ORAL at 15:33

## 2022-01-10 RX ADMIN — GABAPENTIN 100 MG: 100 CAPSULE ORAL at 22:18

## 2022-01-10 RX ADMIN — HYDROCHLOROTHIAZIDE 12.5 MG: 12.5 CAPSULE ORAL at 08:09

## 2022-01-10 RX ADMIN — Medication 1000 UNITS: at 08:10

## 2022-01-10 RX ADMIN — LIDOCAINE 1 PATCH: 50 PATCH TOPICAL at 15:34

## 2022-01-10 RX ADMIN — ACETAMINOPHEN 1000 MG: 500 TABLET ORAL at 22:18

## 2022-01-10 RX ADMIN — SODIUM CHLORIDE, PRESERVATIVE FREE 10 ML: 5 INJECTION INTRAVENOUS at 08:11

## 2022-01-10 RX ADMIN — MULTIPLE VITAMINS W/ MINERALS TAB 1 TABLET: TAB at 08:10

## 2022-01-10 RX ADMIN — LOSARTAN POTASSIUM 100 MG: 100 TABLET, FILM COATED ORAL at 08:10

## 2022-01-10 RX ADMIN — GABAPENTIN 100 MG: 100 CAPSULE ORAL at 15:34

## 2022-01-10 RX ADMIN — HYDROCODONE BITARTRATE AND ACETAMINOPHEN 1 TABLET: 5; 325 TABLET ORAL at 06:33

## 2022-01-10 RX ADMIN — DOCUSATE SODIUM 50MG AND SENNOSIDES 8.6MG 2 TABLET: 8.6; 5 TABLET, FILM COATED ORAL at 08:10
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
## 2022-01-10 NOTE — DISCHARGE INSTRUCTIONS
Sternal Precautions    Follow these instructions at home:  · Take over-the-counter and prescription medicines only as told by your health care provider.  · Rest at home. Return to your normal activities as told by your health care provider. Ask your health care provider what activities are safe for you.  · If directed, apply ice to the injured area:  ? Put ice in a plastic bag.  ? Place a towel between your skin and the bag.  ? Leave the ice on for 20 minutes, 2-3 times a day.  · Do not lift anything that is heavier than 10 lb (4.5 kg) until your health care provider says it is safe.  · Do not push or pull with your arms.  · Do not reach behind your back or reach both arms out to the side.  · Do not reach both arms overhead. Roll your shoulders to stretch.  · Do not drive or operate heavy machinery while taking prescription pain medicine.  · Do not use any tobacco products, such as cigarettes, chewing tobacco, and e-cigarettes. If you need help quitting, ask your health care provider.  · Use your legs to stand up from a chair.   · Use the log roll technique when getting out of bed. When rising from bed, many people use their arms and hands to pull themselves up. If you are maintaining sternal precautions, you must not do this. To rise from bed, use the log roll technique. Simply lie on your back, and then roll onto one side. Allow your legs to fall slowly off the edge of your bed, and allow their momentum to help your upper body rise up, taking care not to push or pull with your arms.  · Use a pillow to splint yourself when coughing.  · Keep all follow-up visits as told by your health care provider. This is important.  Contact a health care provider if:  · Your pain medicine is not helping.  · You continue to have pain after several weeks.  · You develop a fever.  · You develop a cough and you have thick or bloody mucus (sputum).  Get help right away if:  · You have difficulty breathing.  · You have chest  pain.  · You have an abnormal heartbeat (palpitations).  · You feel nauseous or you have pain in your abdomen.  This information is not intended to replace advice given to you by your health care provider. Make sure you discuss any questions you have with your health care provider.

## 2022-01-10 NOTE — CASE MANAGEMENT/SOCIAL WORK
Continued Stay Note  [facility]     Patient Name: Kristie Franz  MRN: [id number]  Today's Date: 1/10/2022    Admit Date: 1/9/2022     Discharge Plan     Row Name 01/10/22 0834       Plan    Plan Plan home.   MAXIMILIAN Olsen RN    Patient/Family in Agreement with Plan yes    Plan Comments FACE SHEET VERIFIED/ MELGOZA SIGNED.  Spoke with pt and her daughter (Ester Rosen [phone]) at bedside.  Pt's PCP id Dr. Mariia Trujillo.  Pt lives alone in a single story house.  Pt denies using any DMEs.  Pt gets her prescriptions at [facility]  ([facility]  Pt denies any issues affording medications.  Pt is not current with .   Pt has been in [facility] for skilled care.  Pt denies any discharge needs.  Pt's daughters  ( Mary Sesay [phone]) and ( Ester Rosen [phone]) can assist pt if needed.  Pt's family will provide transportation home.  Pt denies any discharge needs at present.  Plan home.  MAXIMILIAN Camargo RN               Discharge Codes    No documentation.                     Tiffanie Camargo RN
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
## 2022-01-10 NOTE — PLAN OF CARE
Goal Outcome Evaluation:  Plan of Care Reviewed With: patient           Outcome Summary: vitals stable.  pt expressed pain.  meds given per orders.  thoracic surgery consult.  will continue to monitor.
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
## 2022-01-10 NOTE — CASE MANAGEMENT/SOCIAL WORK
Discharge Planning Assessment  [facility]     Patient Name: Kristie Franz  MRN: [id number]  Today's Date: 1/10/2022    Admit Date: 1/9/2022     Discharge Needs Assessment     Row Name 01/10/22 0832       Living Environment    Lives With alone    Current Living Arrangements home/apartment/condo    Primary Care Provided by self    Provides Primary Care For no one    Family Caregiver if Needed child(willa), adult    Family Caregiver Names Daughters   ( Mary Sesay [phone]) and ( Ester Rosen [phone])    Quality of Family Relationships helpful; involved; supportive    Able to Return to Prior Arrangements yes    Living Arrangement Comments Pt lives alone in a single story house.       Resource/Environmental Concerns    Resource/Environmental Concerns none    Transportation Concerns car, none       Transition Planning    Patient/Family Anticipates Transition to home    Patient/Family Anticipated Services at Transition none    Transportation Anticipated family or friend will provide       Discharge Needs Assessment    Readmission Within the Last 30 Days no previous admission in last 30 days    Equipment Currently Used at Home none    Concerns to be Addressed no discharge needs identified; denies needs/concerns at this time    Anticipated Changes Related to Illness none    Equipment Needed After Discharge none               Discharge Plan     Row Name 01/10/22 0834       Plan    Plan Plan home.   MAXIMILIAN Olsen RN    Patient/Family in Agreement with Plan yes    Plan Comments FACE SHEET VERIFIED/ MELGOZA SIGNED.  Spoke with pt and her daughter (Ester Rosen [phone]) at bedside.  Pt's PCP id Dr. Mariia Trujillo.  Pt lives alone in a single story house.  Pt denies using any DMEs.  Pt gets her prescriptions at [facility]  ([facility]  Pt denies any issues affording medications.  Pt is not current with .   Pt has been in [facility] for skilled care.  Pt denies any discharge needs.  Pt's  daughters  ( Mary Sesay [phone]) and ( Ester Rosen [phone]) can assist pt if needed.  Pt's family will provide transportation home.  Pt denies any discharge needs at present.  Plan home.  MAXIMILIAN Camargo RN              Continued Care and Services - Admitted Since 1/9/2022    Coordination has not been started for this encounter.          Demographic Summary     Row Name 01/10/22 0832       General Information    Admission Type observation    Arrived From emergency department    Required Notices Provided Observation Status Notice    Referral Source admission list    Reason for Consult discharge planning    Preferred Language English     Used During This Interaction no               Functional Status     Row Name 01/10/22 0832       Functional Status    Usual Activity Tolerance good    Current Activity Tolerance good       Functional Status, IADL    Medications independent    Meal Preparation independent    Housekeeping independent    Laundry independent    Shopping independent       Mental Status    General Appearance WDL WDL               Psychosocial    No documentation.                Abuse/Neglect    No documentation.                Legal    No documentation.                Substance Abuse    No documentation.                Patient Forms    No documentation.                   Tiffanie Camargo RN
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
## 2022-01-10 NOTE — ED NOTES
Called daughter Ester at [phone] to update her on her mothers status     Behringer, Catherine, RN  01/09/22 1953
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
## 2022-01-10 NOTE — ED NOTES
Attempted to call report to 6E. Nurse not available at this time and will call back when able       Behringer, Catherine, RN  01/09/22 [id number]

## 2022-01-11 ENCOUNTER — APPOINTMENT (OUTPATIENT)
Dept: GENERAL RADIOLOGY | Facility: HOSPITAL | Age: 82
End: 2022-01-11

## 2022-01-11 ENCOUNTER — READMISSION MANAGEMENT (OUTPATIENT)
Dept: CALL CENTER | Facility: HOSPITAL | Age: 82
End: 2022-01-11

## 2022-01-11 ENCOUNTER — DOCUMENTATION (OUTPATIENT)
Dept: SURGERY | Facility: CLINIC | Age: 82
End: 2022-01-11

## 2022-01-11 VITALS
SYSTOLIC BLOOD PRESSURE: 150 MMHG | DIASTOLIC BLOOD PRESSURE: 88 MMHG | BODY MASS INDEX: 26.43 KG/M2 | WEIGHT: 140 LBS | TEMPERATURE: 98.1 F | RESPIRATION RATE: 18 BRPM | OXYGEN SATURATION: 95 % | HEIGHT: 61 IN | HEART RATE: 64 BPM

## 2022-01-11 DIAGNOSIS — S22.20XA CLOSED FRACTURE OF STERNUM, UNSPECIFIED PORTION OF STERNUM, INITIAL ENCOUNTER: Primary | ICD-10-CM

## 2022-01-11 LAB
ALBUMIN SERPL-MCNC: 3.9 G/DL (ref 3.5–5.2)
ALBUMIN/GLOB SERPL: 1.9 G/DL
ALP SERPL-CCNC: 65 U/L (ref 39–117)
ALT SERPL W P-5'-P-CCNC: 12 U/L (ref 1–33)
ANION GAP SERPL CALCULATED.3IONS-SCNC: 8.4 MMOL/L (ref 5–15)
AST SERPL-CCNC: 19 U/L (ref 1–32)
BASOPHILS # BLD AUTO: 0.05 10*3/MM3 (ref 0–0.2)
BASOPHILS NFR BLD AUTO: 0.8 % (ref 0–1.5)
BILIRUB SERPL-MCNC: 0.8 MG/DL (ref 0–1.2)
BUN SERPL-MCNC: 19 MG/DL (ref 8–23)
BUN/CREAT SERPL: 24.1 (ref 7–25)
CALCIUM SPEC-SCNC: 9 MG/DL (ref 8.6–10.5)
CHLORIDE SERPL-SCNC: 100 MMOL/L (ref 98–107)
CO2 SERPL-SCNC: 29.6 MMOL/L (ref 22–29)
CREAT SERPL-MCNC: 0.79 MG/DL (ref 0.57–1)
DEPRECATED RDW RBC AUTO: 40.5 FL (ref 37–54)
EOSINOPHIL # BLD AUTO: 0.25 10*3/MM3 (ref 0–0.4)
EOSINOPHIL NFR BLD AUTO: 3.8 % (ref 0.3–6.2)
ERYTHROCYTE [DISTWIDTH] IN BLOOD BY AUTOMATED COUNT: 11.8 % (ref 12.3–15.4)
GFR SERPL CREATININE-BSD FRML MDRD: 70 ML/MIN/1.73
GLOBULIN UR ELPH-MCNC: 2.1 GM/DL
GLUCOSE SERPL-MCNC: 103 MG/DL (ref 65–99)
HCT VFR BLD AUTO: 36.8 % (ref 34–46.6)
HGB BLD-MCNC: 12.4 G/DL (ref 12–15.9)
IMM GRANULOCYTES # BLD AUTO: 0.01 10*3/MM3 (ref 0–0.05)
IMM GRANULOCYTES NFR BLD AUTO: 0.2 % (ref 0–0.5)
LYMPHOCYTES # BLD AUTO: 1.59 10*3/MM3 (ref 0.7–3.1)
LYMPHOCYTES NFR BLD AUTO: 24.2 % (ref 19.6–45.3)
MAGNESIUM SERPL-MCNC: 2 MG/DL (ref 1.6–2.4)
MCH RBC QN AUTO: 31.3 PG (ref 26.6–33)
MCHC RBC AUTO-ENTMCNC: 33.7 G/DL (ref 31.5–35.7)
MCV RBC AUTO: 92.9 FL (ref 79–97)
MONOCYTES # BLD AUTO: 0.74 10*3/MM3 (ref 0.1–0.9)
MONOCYTES NFR BLD AUTO: 11.3 % (ref 5–12)
NEUTROPHILS NFR BLD AUTO: 3.93 10*3/MM3 (ref 1.7–7)
NEUTROPHILS NFR BLD AUTO: 59.7 % (ref 42.7–76)
NRBC BLD AUTO-RTO: 0 /100 WBC (ref 0–0.2)
PLATELET # BLD AUTO: 186 10*3/MM3 (ref 140–450)
PMV BLD AUTO: 12 FL (ref 6–12)
POTASSIUM SERPL-SCNC: 3.7 MMOL/L (ref 3.5–5.2)
PROT SERPL-MCNC: 6 G/DL (ref 6–8.5)
RBC # BLD AUTO: 3.96 10*6/MM3 (ref 3.77–5.28)
SODIUM SERPL-SCNC: 138 MMOL/L (ref 136–145)
WBC NRBC COR # BLD: 6.57 10*3/MM3 (ref 3.4–10.8)

## 2022-01-11 PROCEDURE — G0378 HOSPITAL OBSERVATION PER HR: HCPCS

## 2022-01-11 PROCEDURE — 71045 X-RAY EXAM CHEST 1 VIEW: CPT

## 2022-01-11 PROCEDURE — 80053 COMPREHEN METABOLIC PANEL: CPT | Performed by: HOSPITALIST

## 2022-01-11 PROCEDURE — 97530 THERAPEUTIC ACTIVITIES: CPT

## 2022-01-11 PROCEDURE — 85025 COMPLETE CBC W/AUTO DIFF WBC: CPT | Performed by: HOSPITALIST

## 2022-01-11 PROCEDURE — 83735 ASSAY OF MAGNESIUM: CPT | Performed by: STUDENT IN AN ORGANIZED HEALTH CARE EDUCATION/TRAINING PROGRAM

## 2022-01-11 RX ORDER — HYDROCODONE BITARTRATE AND ACETAMINOPHEN 5; 325 MG/1; MG/1
1 TABLET ORAL EVERY 4 HOURS PRN
Qty: 30 TABLET | Refills: 0 | Status: CANCELLED | OUTPATIENT
Start: 2022-01-11 | End: 2022-01-21

## 2022-01-11 RX ORDER — HYDROCODONE BITARTRATE AND ACETAMINOPHEN 5; 325 MG/1; MG/1
1 TABLET ORAL EVERY 6 HOURS PRN
Status: DISCONTINUED | OUTPATIENT
Start: 2022-01-11 | End: 2022-01-11 | Stop reason: HOSPADM

## 2022-01-11 RX ORDER — ACETAMINOPHEN 500 MG
1000 TABLET ORAL 3 TIMES DAILY
Qty: 180 TABLET | Refills: 0 | Status: SHIPPED | OUTPATIENT
Start: 2022-01-11 | End: 2022-01-11 | Stop reason: HOSPADM

## 2022-01-11 RX ORDER — HYDROCODONE BITARTRATE AND ACETAMINOPHEN 5; 325 MG/1; MG/1
1 TABLET ORAL EVERY 6 HOURS PRN
Qty: 25 TABLET | Refills: 0 | Status: SHIPPED | OUTPATIENT
Start: 2022-01-11 | End: 2022-03-21

## 2022-01-11 RX ORDER — CELECOXIB 100 MG/1
100 CAPSULE ORAL 2 TIMES DAILY
Qty: 60 CAPSULE | Refills: 0 | Status: SHIPPED | OUTPATIENT
Start: 2022-01-11 | End: 2022-02-10

## 2022-01-11 RX ORDER — HYDROCODONE BITARTRATE AND ACETAMINOPHEN 5; 325 MG/1; MG/1
1 TABLET ORAL EVERY 6 HOURS PRN
Qty: 20 TABLET | Refills: 0 | Status: SHIPPED | OUTPATIENT
Start: 2022-01-11 | End: 2022-01-18

## 2022-01-11 RX ADMIN — LEVOTHYROXINE SODIUM 50 MCG: 0.05 TABLET ORAL at 06:36

## 2022-01-11 RX ADMIN — MULTIPLE VITAMINS W/ MINERALS TAB 1 TABLET: TAB at 08:19

## 2022-01-11 RX ADMIN — LOSARTAN POTASSIUM 100 MG: 100 TABLET, FILM COATED ORAL at 08:19

## 2022-01-11 RX ADMIN — ACETAMINOPHEN 1000 MG: 500 TABLET ORAL at 08:19

## 2022-01-11 RX ADMIN — Medication 1000 UNITS: at 08:19

## 2022-01-11 RX ADMIN — LIDOCAINE 1 PATCH: 50 PATCH TOPICAL at 08:20

## 2022-01-11 RX ADMIN — HYDROCHLOROTHIAZIDE 12.5 MG: 12.5 CAPSULE ORAL at 08:19

## 2022-01-11 RX ADMIN — DOCUSATE SODIUM 50MG AND SENNOSIDES 8.6MG 2 TABLET: 8.6; 5 TABLET, FILM COATED ORAL at 08:19

## 2022-01-11 RX ADMIN — CELECOXIB 100 MG: 100 CAPSULE ORAL at 08:19

## 2022-01-11 RX ADMIN — SODIUM CHLORIDE, PRESERVATIVE FREE 10 ML: 5 INJECTION INTRAVENOUS at 08:21

## 2022-01-11 RX ADMIN — GABAPENTIN 100 MG: 100 CAPSULE ORAL at 08:19

## 2022-01-11 NOTE — CASE MANAGEMENT/SOCIAL WORK
Case Management Discharge Note      Final Note: Pt discharged home.   MAXIMILIAN Camargo RN         Selected Continued Care - Admitted Since 1/9/2022     Destination    No services have been selected for the patient.              Durable Medical Equipment    No services have been selected for the patient.              Dialysis/Infusion    No services have been selected for the patient.              Home Medical Care    No services have been selected for the patient.              Therapy    No services have been selected for the patient.              Community Resources    No services have been selected for the patient.              Community & DME    No services have been selected for the patient.                  Transportation Services  Private: Car    Final Discharge Disposition Code: 01 - home or self-care

## 2022-01-11 NOTE — PLAN OF CARE
Goal Outcome Evaluation:  Patient alert and oriented. Bp just slightly elevated. Patient instructed to follow up with her primary care provider. AVS given to patient with all discharge instructions and precautions. On room air. Patient being discharged home.
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
## 2022-01-11 NOTE — PLAN OF CARE
Goal Outcome Evaluation:      Pt is Alert and Oriented. Pt medicated per orders. Pt had complaints of headache but no sternum pain. Pt did not require any PRN pain medication throughout shift. Pt up to bathroom with x1 assist. Pt did well. Pt on room air throughout shift. Pt wore chest vest throughout. Vital signs are within normal limits at this time.

## 2022-01-11 NOTE — PLAN OF CARE
Goal Outcome Evaluation:  Plan of Care Reviewed With: patient        Progress: improving  Outcome Summary: pt steadier today, felt more comfortable using a rolling walker, pt able to ambulate with Rwx independently with no loss of balance, pt is approriate to go home, daughters available to check in on her and assist with household chores, plans home

## 2022-01-12 ENCOUNTER — TRANSITIONAL CARE MANAGEMENT TELEPHONE ENCOUNTER (OUTPATIENT)
Dept: CALL CENTER | Facility: HOSPITAL | Age: 82
End: 2022-01-12
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
## 2022-01-21 ENCOUNTER — OFFICE VISIT (OUTPATIENT)
Dept: INTERNAL MEDICINE | Facility: CLINIC | Age: 82
End: 2022-01-21

## 2022-01-21 VITALS
WEIGHT: 140 LBS | DIASTOLIC BLOOD PRESSURE: 70 MMHG | OXYGEN SATURATION: 98 % | TEMPERATURE: 96.8 F | HEART RATE: 63 BPM | BODY MASS INDEX: 26.43 KG/M2 | HEIGHT: 61 IN | SYSTOLIC BLOOD PRESSURE: 142 MMHG

## 2022-01-21 DIAGNOSIS — I10 PRIMARY HYPERTENSION: ICD-10-CM

## 2022-01-21 DIAGNOSIS — I63.81 LACUNAR INFARCTION: ICD-10-CM

## 2022-01-21 DIAGNOSIS — K22.89 THICKENING OF ESOPHAGUS: ICD-10-CM

## 2022-01-21 DIAGNOSIS — S22.20XD CLOSED FRACTURE OF STERNUM WITH ROUTINE HEALING, UNSPECIFIED PORTION OF STERNUM, SUBSEQUENT ENCOUNTER: Primary | ICD-10-CM

## 2022-01-21 DIAGNOSIS — E78.49 OTHER HYPERLIPIDEMIA: ICD-10-CM

## 2022-01-21 PROCEDURE — 99495 TRANSJ CARE MGMT MOD F2F 14D: CPT | Performed by: FAMILY MEDICINE

## 2022-01-21 PROCEDURE — 1111F DSCHRG MED/CURRENT MED MERGE: CPT | Performed by: FAMILY MEDICINE

## 2022-01-21 RX ORDER — HYDROCHLOROTHIAZIDE 25 MG/1
25 TABLET ORAL DAILY
Qty: 30 TABLET | Refills: 2 | Status: SHIPPED | OUTPATIENT
Start: 2022-01-21 | End: 2022-04-22

## 2022-01-21 RX ORDER — ROSUVASTATIN CALCIUM 10 MG/1
10 TABLET, COATED ORAL DAILY
Qty: 30 TABLET | Refills: 2 | Status: SHIPPED | OUTPATIENT
Start: 2022-01-21 | End: 2022-03-18 | Stop reason: SDUPTHER
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
## 2022-02-28 ENCOUNTER — OFFICE VISIT (OUTPATIENT)
Dept: NEUROLOGY | Facility: CLINIC | Age: 82
End: 2022-02-28

## 2022-02-28 VITALS
DIASTOLIC BLOOD PRESSURE: 68 MMHG | WEIGHT: 126 LBS | HEIGHT: 61 IN | SYSTOLIC BLOOD PRESSURE: 140 MMHG | HEART RATE: 78 BPM | BODY MASS INDEX: 23.79 KG/M2

## 2022-02-28 DIAGNOSIS — I69.30 SEQUELAE, POST-STROKE: Primary | ICD-10-CM

## 2022-02-28 DIAGNOSIS — R79.9 ABNORMAL FINDING OF BLOOD CHEMISTRY, UNSPECIFIED: ICD-10-CM

## 2022-02-28 DIAGNOSIS — R00.2 PALPITATIONS: ICD-10-CM

## 2022-02-28 PROCEDURE — 99203 OFFICE O/P NEW LOW 30 MIN: CPT | Performed by: STUDENT IN AN ORGANIZED HEALTH CARE EDUCATION/TRAINING PROGRAM

## 2022-03-01 NOTE — PROGRESS NOTES
"Chief Complaint   Patient presents with   • abnormal imaging of esophagus   • Hiatal Hernia         History of Present Illness  Patient here for consultation regarding thickening of her esophagus that was noted on chest CT.  She also had note of a small hiatal hernia. She denies any epigastric pain or dysphagia. She reports that sometimes spicy foods will aggravate her upper GI tract. She is not currently taking any acid suppressive medication.     She is in the midst of a cardiac work up/evalution as well. She reports an upcoming 30 day heart monitor study.     She reports having a ColoGuard test 3 years ago that was normal.        Result Review :       Progress Notes by Mariia Trujillo MD (01/21/2022 10:00)  CT Abdomen Pelvis With Contrast (01/09/2022 17:09)  Comprehensive Metabolic Panel (01/11/2022 05:16)    Vital Signs:   /80   Pulse 93   Temp 96.6 °F (35.9 °C)   Ht 154.9 cm (60.98\")   Wt 64.1 kg (141 lb 6.4 oz)   SpO2 96%   BMI 26.73 kg/m²     Body mass index is 26.73 kg/m².     Physical Exam  Vitals reviewed.   Constitutional:       Appearance: Normal appearance.   Abdominal:      General: Bowel sounds are normal. There is no distension.      Palpations: Abdomen is soft. Abdomen is not rigid. There is no mass or pulsatile mass.      Tenderness: There is no abdominal tenderness. There is no guarding or rebound.           Assessment and Plan    Diagnoses and all orders for this visit:    1. Abnormal CT scan, esophagus (Primary)    2. Hiatal hernia    Documentation by Brenda IBARRA acting as a scribe in the following sections (HPI, Plan) for the undersigned provider.        BRIEF SUMMARY  Patient for consultation.  She had a CT of the chest done which showed a hiatal hernia and a thickening of the distal esophagus.  She denies dysphagia.  She has occasional heartburn issues.  We will tentatively schedule an EGD after her cardiac evaluation has been done.  She also may need a screening " colonoscopy and will defer to her primary physician and if necessary would proceed with colonoscopy at the time of her EGD.    I have reviewed and confirmed the accuracy of the HPI and Assessment and Plan as documented by the [facility]        Follow Up   No follow-ups on file.    Patient Instructions   1. For further evaluation of abnormal imaging of the esophagus and hiatal hernia we will schedule an EGD. Additional recommendations pending outcome of EGD. We will obtain cardiac clearance prior to your procedure.     2. You can discuss with Dr. Trujillo whether she need to have a colonoscopy vs. ColoGuard testing. If colonoscopy is warranted, please contact our office and we will place orders accordingly.

## 2022-03-02 ENCOUNTER — PREP FOR SURGERY (OUTPATIENT)
Dept: SURGERY | Facility: SURGERY CENTER | Age: 82
End: 2022-03-02

## 2022-03-02 ENCOUNTER — OFFICE VISIT (OUTPATIENT)
Dept: GASTROENTEROLOGY | Facility: CLINIC | Age: 82
End: 2022-03-02

## 2022-03-02 VITALS
HEART RATE: 93 BPM | DIASTOLIC BLOOD PRESSURE: 80 MMHG | WEIGHT: 141.4 LBS | HEIGHT: 61 IN | OXYGEN SATURATION: 96 % | SYSTOLIC BLOOD PRESSURE: 115 MMHG | BODY MASS INDEX: 26.7 KG/M2 | TEMPERATURE: 96.6 F

## 2022-03-02 DIAGNOSIS — K44.9 HIATAL HERNIA: ICD-10-CM

## 2022-03-02 DIAGNOSIS — R93.3 ABNORMAL CT SCAN, ESOPHAGUS: Primary | ICD-10-CM

## 2022-03-02 PROCEDURE — 99204 OFFICE O/P NEW MOD 45 MIN: CPT | Performed by: INTERNAL MEDICINE

## 2022-03-02 RX ORDER — SODIUM CHLORIDE, SODIUM LACTATE, POTASSIUM CHLORIDE, CALCIUM CHLORIDE 600; 310; 30; 20 MG/100ML; MG/100ML; MG/100ML; MG/100ML
30 INJECTION, SOLUTION INTRAVENOUS CONTINUOUS PRN
Status: CANCELLED | OUTPATIENT
Start: 2022-03-02

## 2022-03-02 RX ORDER — SODIUM CHLORIDE 0.9 % (FLUSH) 0.9 %
10 SYRINGE (ML) INJECTION AS NEEDED
Status: CANCELLED | OUTPATIENT
Start: 2022-03-02

## 2022-03-02 RX ORDER — SODIUM CHLORIDE 0.9 % (FLUSH) 0.9 %
3 SYRINGE (ML) INJECTION EVERY 12 HOURS SCHEDULED
Status: CANCELLED | OUTPATIENT
Start: 2022-03-02

## 2022-03-02 NOTE — PATIENT INSTRUCTIONS
1. For further evaluation of abnormal imaging of the esophagus and hiatal hernia we will schedule an EGD. Additional recommendations pending outcome of EGD. We will obtain cardiac clearance prior to your procedure.     2. You can discuss with Dr. Trujillo whether she need to have a colonoscopy vs. ColoGuard testing. If colonoscopy is warranted, please contact our office and we will place orders accordingly.

## 2022-03-09 ENCOUNTER — PATIENT ROUNDING (BHMG ONLY) (OUTPATIENT)
Dept: NEUROLOGY | Facility: CLINIC | Age: 82
End: 2022-03-09

## 2022-03-11 DIAGNOSIS — E78.49 OTHER HYPERLIPIDEMIA: ICD-10-CM

## 2022-03-15 LAB
ALBUMIN SERPL-MCNC: 4 G/DL (ref 3.6–4.6)
ALBUMIN/GLOB SERPL: 1.7 {RATIO} (ref 1.2–2.2)
ALP SERPL-CCNC: 82 IU/L (ref 44–121)
ALT SERPL-CCNC: 14 IU/L (ref 0–32)
AST SERPL-CCNC: 21 IU/L (ref 0–40)
BILIRUB SERPL-MCNC: 0.7 MG/DL (ref 0–1.2)
BUN SERPL-MCNC: 17 MG/DL (ref 8–27)
BUN/CREAT SERPL: 21 (ref 12–28)
CALCIUM SERPL-MCNC: 9.1 MG/DL (ref 8.7–10.3)
CHLORIDE SERPL-SCNC: 105 MMOL/L (ref 96–106)
CHOLEST SERPL-MCNC: 166 MG/DL (ref 100–199)
CO2 SERPL-SCNC: 26 MMOL/L (ref 20–29)
CREAT SERPL-MCNC: 0.8 MG/DL (ref 0.57–1)
EGFR GENE MUT ANL BLD/T: 74 ML/MIN/1.73
GLOBULIN SER CALC-MCNC: 2.3 G/DL (ref 1.5–4.5)
GLUCOSE SERPL-MCNC: 96 MG/DL (ref 65–99)
HDLC SERPL-MCNC: 83 MG/DL
LDLC SERPL CALC-MCNC: 69 MG/DL (ref 0–99)
LDLC/HDLC SERPL: 0.8 RATIO (ref 0–3.2)
POTASSIUM SERPL-SCNC: 4.3 MMOL/L (ref 3.5–5.2)
PROT SERPL-MCNC: 6.3 G/DL (ref 6–8.5)
SODIUM SERPL-SCNC: 143 MMOL/L (ref 134–144)
TRIGL SERPL-MCNC: 74 MG/DL (ref 0–149)
VLDLC SERPL CALC-MCNC: 14 MG/DL (ref 5–40)

## 2022-03-16 ENCOUNTER — TELEPHONE (OUTPATIENT)
Dept: NEUROLOGY | Facility: CLINIC | Age: 82
End: 2022-03-16

## 2022-03-16 NOTE — TELEPHONE ENCOUNTER
Caller: Kristie Franz    Relationship: Self    Best call back number: [phone]    What was the call regarding: PT CALLED TO ASK IF SHE WOULD NEED TO FAST FOR THE HEMOGLOBIN A1C LAB.    Do you require a callback: YES, PLEASE.    PLEASE REVIEW AND ADVISE.

## 2022-03-17 NOTE — TELEPHONE ENCOUNTER
Caller: Kristie Franz    Relationship: Self    Best call back number: [phone]    What was the call regarding: PT CALLED TO CLARIFY THAT SHE CAN HAVE LAB COMPLETED AT [facility] ON THE 1ST FLOOR. ADVISED YES, THEY DO HAVE ORDER AND SHE CAN GO AT ANY TIME DURING BUSINESS HOURS TO HAVE LABS DRAWN. PT VERBALIZED UNDERSTANDING.    Do you require a callback: NO    DOCUMENTING PER [employer].

## 2022-03-18 RX ORDER — ROSUVASTATIN CALCIUM 10 MG/1
10 TABLET, COATED ORAL DAILY
Qty: 90 TABLET | Refills: 1 | Status: SHIPPED | OUTPATIENT
Start: 2022-03-18 | End: 2022-04-20 | Stop reason: SDUPTHER

## 2022-03-21 ENCOUNTER — OFFICE VISIT (OUTPATIENT)
Dept: INTERNAL MEDICINE | Facility: CLINIC | Age: 82
End: 2022-03-21

## 2022-03-21 ENCOUNTER — LAB (OUTPATIENT)
Dept: LAB | Facility: HOSPITAL | Age: 82
End: 2022-03-21

## 2022-03-21 VITALS
DIASTOLIC BLOOD PRESSURE: 70 MMHG | OXYGEN SATURATION: 98 % | HEART RATE: 81 BPM | HEIGHT: 61 IN | SYSTOLIC BLOOD PRESSURE: 124 MMHG | TEMPERATURE: 97.3 F | WEIGHT: 141 LBS | BODY MASS INDEX: 26.62 KG/M2

## 2022-03-21 DIAGNOSIS — I69.30 SEQUELAE, POST-STROKE: ICD-10-CM

## 2022-03-21 DIAGNOSIS — E03.9 ACQUIRED HYPOTHYROIDISM: ICD-10-CM

## 2022-03-21 DIAGNOSIS — I10 PRIMARY HYPERTENSION: Primary | ICD-10-CM

## 2022-03-21 DIAGNOSIS — R79.9 ABNORMAL FINDING OF BLOOD CHEMISTRY, UNSPECIFIED: ICD-10-CM

## 2022-03-21 DIAGNOSIS — I10 PRIMARY HYPERTENSION: ICD-10-CM

## 2022-03-21 DIAGNOSIS — E78.49 OTHER HYPERLIPIDEMIA: ICD-10-CM

## 2022-03-21 DIAGNOSIS — Z12.11 SCREENING FOR COLON CANCER: ICD-10-CM

## 2022-03-21 LAB
ANION GAP SERPL CALCULATED.3IONS-SCNC: 9.8 MMOL/L (ref 5–15)
BUN SERPL-MCNC: 25 MG/DL (ref 8–23)
BUN/CREAT SERPL: 29.8 (ref 7–25)
CALCIUM SPEC-SCNC: 9.6 MG/DL (ref 8.6–10.5)
CHLORIDE SERPL-SCNC: 103 MMOL/L (ref 98–107)
CO2 SERPL-SCNC: 28.2 MMOL/L (ref 22–29)
CREAT SERPL-MCNC: 0.84 MG/DL (ref 0.57–1)
DEPRECATED RDW RBC AUTO: 43.2 FL (ref 37–54)
EGFRCR SERPLBLD CKD-EPI 2021: 69.9 ML/MIN/1.73
ERYTHROCYTE [DISTWIDTH] IN BLOOD BY AUTOMATED COUNT: 12.5 % (ref 12.3–15.4)
GLUCOSE SERPL-MCNC: 78 MG/DL (ref 65–99)
HBA1C MFR BLD: 5.6 % (ref 4.8–5.6)
HCT VFR BLD AUTO: 37.8 % (ref 34–46.6)
HGB BLD-MCNC: 12.5 G/DL (ref 12–15.9)
MCH RBC QN AUTO: 30.9 PG (ref 26.6–33)
MCHC RBC AUTO-ENTMCNC: 33.1 G/DL (ref 31.5–35.7)
MCV RBC AUTO: 93.3 FL (ref 79–97)
PLATELET # BLD AUTO: 189 10*3/MM3 (ref 140–450)
PMV BLD AUTO: 11.1 FL (ref 6–12)
POTASSIUM SERPL-SCNC: 4.2 MMOL/L (ref 3.5–5.2)
RBC # BLD AUTO: 4.05 10*6/MM3 (ref 3.77–5.28)
SODIUM SERPL-SCNC: 141 MMOL/L (ref 136–145)
WBC NRBC COR # BLD: 5.24 10*3/MM3 (ref 3.4–10.8)

## 2022-03-21 PROCEDURE — 84443 ASSAY THYROID STIM HORMONE: CPT

## 2022-03-21 PROCEDURE — 80048 BASIC METABOLIC PNL TOTAL CA: CPT

## 2022-03-21 PROCEDURE — 83036 HEMOGLOBIN GLYCOSYLATED A1C: CPT

## 2022-03-21 PROCEDURE — 99214 OFFICE O/P EST MOD 30 MIN: CPT | Performed by: FAMILY MEDICINE

## 2022-03-21 PROCEDURE — 85027 COMPLETE CBC AUTOMATED: CPT

## 2022-03-21 PROCEDURE — 36415 COLL VENOUS BLD VENIPUNCTURE: CPT

## 2022-03-21 NOTE — PROGRESS NOTES
Subjective   Kristie Franz is a 81 y.o. female.   Chief Complaint   Patient presents with   • Hypertension   • Hyperlipidemia       History of Present Illness     1.  Hypertension-patient is on HCTZ 25 mg a day and losartan 100 mg a day.  We increased dose of HCTZ at last office visit.  She takes it every day.  She has no side effects.  Only occasionally she gets lightheaded when she gets up quickly.  Creatinine 0.8, GFR 74, sodium and potassium normal.  She exercises daily for about 20 minutes.    2.  Hyperlipidemia-at last office visit we started patient on Crestor 10 mg a day.  She takes it every day.  She has no side effects.  No muscle aches, no muscle cramps.  LDL 69 from 158.  HDL 83.  Triglycerides 74.  LFTs normal.    She would like to discussed screening for colon cancer.  She would rather have Cologuard and then colonoscopy.     The following portions of the patient's history were reviewed and updated as appropriate: allergies, current medications, past medical history, past social history and problem list.    Review of Systems   Constitutional: Negative.    Respiratory: Negative.    Cardiovascular: Negative.    Musculoskeletal: Negative for myalgias.   Hematological: Does not bruise/bleed easily.         Objective   Wt Readings from Last 3 Encounters:   03/21/22 64 kg (141 lb)   03/02/22 64.1 kg (141 lb 6.4 oz)   02/28/22 57.2 kg (126 lb)      Vitals:    03/21/22 1410   BP: 124/70   Pulse: 81   Temp: 97.3 °F (36.3 °C)   SpO2: 98%     Temp Readings from Last 3 Encounters:   03/21/22 97.3 °F (36.3 °C)   03/02/22 96.6 °F (35.9 °C)   01/21/22 96.8 °F (36 °C)     BP Readings from Last 3 Encounters:   03/21/22 124/70   03/02/22 115/80   02/28/22 140/68     Pulse Readings from Last 3 Encounters:   03/21/22 81   03/02/22 93   02/28/22 78     Body mass index is 26.66 kg/m².    Physical Exam  Constitutional:       Appearance: Normal appearance. She is well-developed.   HENT:      Head: Normocephalic and  atraumatic.   Neck:      Thyroid: No thyromegaly.      Vascular: No carotid bruit.   Cardiovascular:      Rate and Rhythm: Normal rate and regular rhythm.      Heart sounds: Normal heart sounds.   Pulmonary:      Effort: Pulmonary effort is normal.      Breath sounds: Normal breath sounds.   Musculoskeletal:      Cervical back: Neck supple.   Skin:     General: Skin is warm and dry.   Neurological:      Mental Status: She is alert and oriented to person, place, and time.   Psychiatric:         Behavior: Behavior normal.         Assessment/Plan   Diagnoses and all orders for this visit:    1. Primary hypertension (Primary)  -     CBC (No Diff); Future  -     Basic Metabolic Panel; Future    2. Other hyperlipidemia    3. Screening for colon cancer  -     Cologuard - Stool, Per Rectum; Future    4. Acquired hypothyroidism  -     Thyroid Cascade Profile; Future        1.  Hypertension-continue current treatment.  Do not rash when getting up.  Follow-up in 3 months.  2.  Hyperlipidemia-continue current treatment.  LDL goal is less than 70.  Follow-up in 6 months.    Patient prefers Cologuard over colonoscopy.  We are ordering it.

## 2022-03-22 LAB — TSH SERPL DL<=0.005 MIU/L-ACNC: 0.87 UIU/ML (ref 0.45–4.5)

## 2022-03-25 ENCOUNTER — HOSPITAL ENCOUNTER (OUTPATIENT)
Dept: MRI IMAGING | Facility: HOSPITAL | Age: 82
Discharge: HOME | End: 2022-03-25

## 2022-03-25 DIAGNOSIS — I69.30 SEQUELAE, POST-STROKE: ICD-10-CM

## 2022-03-25 PROCEDURE — 70547 MR ANGIOGRAPHY NECK W/O DYE: CPT

## 2022-03-25 PROCEDURE — 70544 MR ANGIOGRAPHY HEAD W/O DYE: CPT

## 2022-03-25 PROCEDURE — 70551 MRI BRAIN STEM W/O DYE: CPT

## 2022-03-29 ENCOUNTER — TELEPHONE (OUTPATIENT)
Dept: NEUROLOGY | Facility: CLINIC | Age: 82
End: 2022-03-29

## 2022-03-29 NOTE — TELEPHONE ENCOUNTER
Caller: YVETTE KAUFFMAN    Relationship: SELF    Best call back number: [phone]    Caller requesting test results: SELF    What test was performed: MRA OF NECK,HEAD & MRI BRAIN WO    When was the test performed: 3-25-22    Where was the test performed: [place] [facility]    Additional notes: PT IS WANTING RESULTS OF THE MRA NECK & HEAD & MRI BRAIN.        PT WANTS TO KNOW IF OK FOR HER TO RESUME DRIVING, SHE ASKED HER PCP AND SHE SAID THAT SHE NEEDED TO ASK DR. GARZA.

## 2022-03-31 DIAGNOSIS — R93.0 ABNORMAL MRI OF HEAD: Primary | ICD-10-CM

## 2022-04-14 ENCOUNTER — DOCUMENTATION (OUTPATIENT)
Dept: NEUROLOGY | Facility: CLINIC | Age: 82
End: 2022-04-14

## 2022-04-14 NOTE — TELEPHONE ENCOUNTER
The patient states she spoke to her PCP and she was told by her that she did not see a reason for her not to return to Driving .Unfortunatly there is nothing in the chart or anywhere that backs this conversation up .Would you please send a small letter with a request for the PCP to comment on this driving issue so  that we have something on file just in case .Chelsi Delarosa
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
## 2022-04-18 NOTE — TELEPHONE ENCOUNTER
Caller: YVETTE KAUFFMAN    Relationship to patient:SELF     Best call back number:     Chief complaint: LEFT KNEE PAIN     Additional notes: PATIENT SAID SHE WANTED TO TRY AND SCHEDULE PHYSICAL THERAPY WITH [facility] AND WHEN SHE CALLED OFFICE THEY SAID THEY HAD NO ORDER.  PLEASE CONTACT PATIENT          
Please notify the patient that her order for PT is in for [facility].  
Female

## 2022-04-19 RX ORDER — ROSUVASTATIN CALCIUM 10 MG/1
10 TABLET, COATED ORAL DAILY
Qty: 30 TABLET | OUTPATIENT
Start: 2022-04-19

## 2022-04-20 RX ORDER — ROSUVASTATIN CALCIUM 10 MG/1
10 TABLET, COATED ORAL DAILY
Qty: 90 TABLET | Refills: 1 | Status: SHIPPED | OUTPATIENT
Start: 2022-04-20 | End: 2022-10-12 | Stop reason: SDUPTHER

## 2022-04-22 RX ORDER — HYDROCHLOROTHIAZIDE 25 MG/1
25 TABLET ORAL DAILY
Qty: 30 TABLET | Refills: 2 | Status: SHIPPED | OUTPATIENT
Start: 2022-04-22 | End: 2022-06-22

## 2022-04-28 ENCOUNTER — HOSPITAL ENCOUNTER (OUTPATIENT)
Dept: CT IMAGING | Facility: HOSPITAL | Age: 82
Discharge: HOME | End: 2022-04-28
Admitting: STUDENT IN AN ORGANIZED HEALTH CARE EDUCATION/TRAINING PROGRAM

## 2022-04-28 DIAGNOSIS — R93.0 ABNORMAL MRI OF HEAD: ICD-10-CM

## 2022-04-28 PROCEDURE — 70496 CT ANGIOGRAPHY HEAD: CPT

## 2022-04-28 PROCEDURE — 82565 ASSAY OF CREATININE: CPT

## 2022-04-28 PROCEDURE — 25010000002 IOPAMIDOL 61 % SOLUTION: Performed by: STUDENT IN AN ORGANIZED HEALTH CARE EDUCATION/TRAINING PROGRAM

## 2022-04-28 RX ADMIN — IOPAMIDOL 95 ML: 612 INJECTION, SOLUTION INTRAVENOUS at 13:26

## 2022-04-29 LAB — CREAT BLDA-MCNC: 0.9 MG/DL (ref 0.6–1.3)

## 2022-05-25 ENCOUNTER — OFFICE VISIT (OUTPATIENT)
Dept: NEUROLOGY | Facility: CLINIC | Age: 82
End: 2022-05-25

## 2022-05-25 VITALS
HEART RATE: 64 BPM | BODY MASS INDEX: 26.64 KG/M2 | WEIGHT: 141.09 LBS | HEIGHT: 61 IN | RESPIRATION RATE: 16 BRPM | SYSTOLIC BLOOD PRESSURE: 126 MMHG | DIASTOLIC BLOOD PRESSURE: 68 MMHG

## 2022-05-25 DIAGNOSIS — Z86.73 HISTORY OF STROKE: Primary | ICD-10-CM

## 2022-05-25 DIAGNOSIS — I47.1 SVT (SUPRAVENTRICULAR TACHYCARDIA): ICD-10-CM

## 2022-05-25 PROCEDURE — 99214 OFFICE O/P EST MOD 30 MIN: CPT | Performed by: STUDENT IN AN ORGANIZED HEALTH CARE EDUCATION/TRAINING PROGRAM
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
## 2022-05-26 RX ORDER — ASPIRIN 81 MG/1
81 TABLET ORAL DAILY
Start: 2022-05-26

## 2022-06-22 ENCOUNTER — OFFICE VISIT (OUTPATIENT)
Dept: CARDIOLOGY | Facility: CLINIC | Age: 82
End: 2022-06-22

## 2022-06-22 VITALS
BODY MASS INDEX: 26.06 KG/M2 | SYSTOLIC BLOOD PRESSURE: 98 MMHG | WEIGHT: 138 LBS | HEART RATE: 59 BPM | HEIGHT: 61 IN | DIASTOLIC BLOOD PRESSURE: 72 MMHG

## 2022-06-22 DIAGNOSIS — I63.81 LACUNAR INFARCTION: ICD-10-CM

## 2022-06-22 DIAGNOSIS — I47.1 SUPRAVENTRICULAR TACHYCARDIA, PAROXYSMAL: Primary | ICD-10-CM

## 2022-06-22 DIAGNOSIS — I10 PRIMARY HYPERTENSION: ICD-10-CM

## 2022-06-22 PROCEDURE — 99204 OFFICE O/P NEW MOD 45 MIN: CPT | Performed by: INTERNAL MEDICINE

## 2022-06-22 PROCEDURE — 93000 ELECTROCARDIOGRAM COMPLETE: CPT | Performed by: INTERNAL MEDICINE

## 2022-06-22 RX ORDER — ATENOLOL 25 MG/1
25 TABLET ORAL DAILY
Qty: 30 TABLET | Refills: 11 | Status: SHIPPED | OUTPATIENT
Start: 2022-06-22 | End: 2022-09-21 | Stop reason: SDUPTHER
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
## 2022-07-01 DIAGNOSIS — E78.49 OTHER HYPERLIPIDEMIA: Primary | ICD-10-CM

## 2022-07-05 ENCOUNTER — OFFICE VISIT (OUTPATIENT)
Dept: INTERNAL MEDICINE | Facility: CLINIC | Age: 82
End: 2022-07-05

## 2022-07-05 VITALS
SYSTOLIC BLOOD PRESSURE: 140 MMHG | TEMPERATURE: 97.5 F | HEART RATE: 57 BPM | HEIGHT: 61 IN | OXYGEN SATURATION: 96 % | BODY MASS INDEX: 27.9 KG/M2 | DIASTOLIC BLOOD PRESSURE: 78 MMHG | WEIGHT: 147.8 LBS

## 2022-07-05 DIAGNOSIS — R60.0 BILATERAL LEG EDEMA: Primary | ICD-10-CM

## 2022-07-05 DIAGNOSIS — R05.9 COUGH: ICD-10-CM

## 2022-07-05 PROCEDURE — 99213 OFFICE O/P EST LOW 20 MIN: CPT | Performed by: NURSE PRACTITIONER

## 2022-07-05 RX ORDER — HYDROCHLOROTHIAZIDE 12.5 MG/1
12.5 TABLET ORAL DAILY
Qty: 90 TABLET | Refills: 0 | Status: SHIPPED | OUTPATIENT
Start: 2022-07-05 | End: 2022-07-06 | Stop reason: SDUPTHER

## 2022-07-05 RX ORDER — LOSARTAN POTASSIUM 50 MG/1
50 TABLET ORAL DAILY
Qty: 90 TABLET | Refills: 0 | Status: SHIPPED | OUTPATIENT
Start: 2022-07-05 | End: 2022-07-06 | Stop reason: SDUPTHER

## 2022-07-05 NOTE — PATIENT INSTRUCTIONS
1. LE edema- check US to r/o DVT, restart HCTZ as 12.5 mg start cutting losartan in half, notify for syst <110 or >140 or diast>90  2. Cough- just back from vacation, drove with family, can check COVID swa, notify for persistent or worsening symptoms

## 2022-07-05 NOTE — PROGRESS NOTES
"Subjective   Kristie Franz is a 81 y.o. female. Feet and leg swelling     History of Present Illness    The patient is here today with c/o foot swelling. Has been on a water pill since her 20s due to LE swelling. Her HCTZ was stopped by cardiology recently and she was placed on atenolol.     She fell about 2 days prior to seeing her cardiologist. She then went on vacation. She wonders if some of it was due to her knee pain after her fall. She sent a note to cardiology who recommended going to the ER to r/o EMILEE.    Isha in a care with family 12 hrs on the way down and 6 hrs one day and 5 hours the next.     She is up 10 lbs from when she left home prior to vacation.   The following portions of the patient's history were reviewed and updated as appropriate: allergies, current medications, past family history, past medical history, past social history, past surgical history and problem list.    Review of Systems   HENT: Positive for postnasal drip.    Respiratory: Positive for cough.    Cardiovascular: Positive for leg swelling. Negative for chest pain and palpitations.       Objective   Physical Exam  Constitutional:       Appearance: She is well-developed.   Neck:      Thyroid: No thyromegaly.   Cardiovascular:      Rate and Rhythm: Normal rate and regular rhythm.      Heart sounds: Normal heart sounds.      Comments: L>R    L calf 16.5\"  R calf 15.5\"  Pulmonary:      Effort: Pulmonary effort is normal.      Breath sounds: Normal breath sounds.   Musculoskeletal:      Cervical back: Normal range of motion and neck supple.      Right lower leg: Edema (mod) present.      Left lower leg: Edema (mod) present.   Lymphadenopathy:      Cervical: No cervical adenopathy.   Skin:     General: Skin is warm and dry.   Psychiatric:         Behavior: Behavior normal.         Thought Content: Thought content normal.         Judgment: Judgment normal.         Vitals:    07/05/22 1456   BP: 140/78   Pulse: 57   Temp: 97.5 °F (36.4 " °C)   SpO2: 96%     Body mass index is 27.94 kg/m².      Current Outpatient Medications:   •  aspirin (aspirin) 81 MG EC tablet, Take 1 tablet by mouth Daily., Disp: , Rfl:   •  atenolol (TENORMIN) 25 MG tablet, Take 1 tablet by mouth Daily., Disp: 30 tablet, Rfl: 11  •  citalopram (CeleXA) 20 MG tablet, Take 20 mg by mouth Every Night., Disp: , Rfl:   •  levothyroxine (SYNTHROID, LEVOTHROID) 50 MCG tablet, Take 50 mcg by mouth Daily., Disp: , Rfl:   •  losartan (COZAAR) 50 MG tablet, Take 1 tablet by mouth Daily., Disp: 90 tablet, Rfl: 0  •  rosuvastatin (Crestor) 10 MG tablet, Take 1 tablet by mouth Daily., Disp: 90 tablet, Rfl: 1  •  hydroCHLOROthiazide (HYDRODIURIL) 12.5 MG tablet, Take 1 tablet by mouth Daily., Disp: 90 tablet, Rfl: 0  Assessment & Plan   Diagnoses and all orders for this visit:    1. Bilateral leg edema (Primary)  -     Duplex Venous Lower Extremity - Bilateral CAR    2. Cough    Other orders  -     hydroCHLOROthiazide (HYDRODIURIL) 12.5 MG tablet; Take 1 tablet by mouth Daily.  Dispense: 90 tablet; Refill: 0  -     losartan (COZAAR) 50 MG tablet; Take 1 tablet by mouth Daily.  Dispense: 90 tablet; Refill: 0                 1. LE edema- check US to r/o DVT, restart HCTZ as 12.5 mg start cutting losartan in half, notify for syst <110 or >140 or diast>90, she will follow up with PCP as scheduled on the 13th.   2. Cough- just back from vacation, drove with family, can check COVID swab, notify for persistent or worsening symptoms

## 2022-07-06 ENCOUNTER — HOSPITAL ENCOUNTER (OUTPATIENT)
Dept: CARDIOLOGY | Facility: HOSPITAL | Age: 82
Discharge: HOME | End: 2022-07-06
Admitting: NURSE PRACTITIONER

## 2022-07-06 DIAGNOSIS — E03.9 ACQUIRED HYPOTHYROIDISM: ICD-10-CM

## 2022-07-06 DIAGNOSIS — I10 PRIMARY HYPERTENSION: ICD-10-CM

## 2022-07-06 LAB

## 2022-07-06 PROCEDURE — 93970 EXTREMITY STUDY: CPT

## 2022-07-06 RX ORDER — LOSARTAN POTASSIUM 50 MG/1
50 TABLET ORAL DAILY
Qty: 90 TABLET | Refills: 0 | Status: SHIPPED | OUTPATIENT
Start: 2022-07-06 | End: 2022-07-13 | Stop reason: SDUPTHER

## 2022-07-06 RX ORDER — HYDROCHLOROTHIAZIDE 12.5 MG/1
12.5 TABLET ORAL DAILY
Qty: 90 TABLET | Refills: 0 | Status: SHIPPED | OUTPATIENT
Start: 2022-07-06 | End: 2022-07-13 | Stop reason: SDUPTHER

## 2022-07-06 NOTE — PROGRESS NOTES
Preliminary negative results of today's bilateral lower extremity venous duplex were called to [facility].

## 2022-07-06 NOTE — TELEPHONE ENCOUNTER
Caller: Kristie Franz    Relationship: Self    Best call back number: [phone]    Requested Prescriptions:   Requested Prescriptions     Pending Prescriptions Disp Refills   • hydroCHLOROthiazide (HYDRODIURIL) 12.5 MG tablet 90 tablet 0     Sig: Take 1 tablet by mouth Daily.   • losartan (COZAAR) 50 MG tablet 90 tablet 0     Sig: Take 1 tablet by mouth Daily.        Pharmacy where request should be sent: [id number] [address] AT [facility] - [phone] [facility] [phone]      Additional details provided by patient: PATIENT STATED SHE WAS IN FOR AN APPOINTMENT YESTERDAY WITH [facility] QUINONES AND WAS SUPPOSED TO GET THESE TWO MEDICATIONS CALLED IN. PATIENT CHECKED WITH THE PHARMACY AND THEY HAD NOT GOTTEN ANYTHING. PLEASE ADVISE.    Does the patient have less than a 3 day supply:  [x] Yes  [] No    Norm Viera Rep   07/06/22 10:27 EDT

## 2022-07-07 LAB
ALBUMIN SERPL-MCNC: 3.6 G/DL (ref 3.6–4.6)
ALBUMIN/GLOB SERPL: 1.6 {RATIO} (ref 1.2–2.2)
ALP SERPL-CCNC: 75 IU/L (ref 44–121)
ALT SERPL-CCNC: 13 IU/L (ref 0–32)
AST SERPL-CCNC: 18 IU/L (ref 0–40)
BILIRUB SERPL-MCNC: 0.6 MG/DL (ref 0–1.2)
BUN SERPL-MCNC: 13 MG/DL (ref 8–27)
BUN/CREAT SERPL: 16 (ref 12–28)
CALCIUM SERPL-MCNC: 9 MG/DL (ref 8.7–10.3)
CHLORIDE SERPL-SCNC: 106 MMOL/L (ref 96–106)
CHOLEST SERPL-MCNC: 139 MG/DL (ref 100–199)
CO2 SERPL-SCNC: 26 MMOL/L (ref 20–29)
CREAT SERPL-MCNC: 0.8 MG/DL (ref 0.57–1)
EGFRCR SERPLBLD CKD-EPI 2021: 74 ML/MIN/1.73
ERYTHROCYTE [DISTWIDTH] IN BLOOD BY AUTOMATED COUNT: 12.2 % (ref 11.7–15.4)
GLOBULIN SER CALC-MCNC: 2.2 G/DL (ref 1.5–4.5)
GLUCOSE SERPL-MCNC: 87 MG/DL (ref 65–99)
HCT VFR BLD AUTO: 32.3 % (ref 34–46.6)
HDLC SERPL-MCNC: 69 MG/DL
HGB BLD-MCNC: 10.7 G/DL (ref 11.1–15.9)
LDLC SERPL CALC-MCNC: 59 MG/DL (ref 0–99)
LDLC/HDLC SERPL: 0.9 RATIO (ref 0–3.2)
MCH RBC QN AUTO: 31.1 PG (ref 26.6–33)
MCHC RBC AUTO-ENTMCNC: 33.1 G/DL (ref 31.5–35.7)
MCV RBC AUTO: 94 FL (ref 79–97)
PLATELET # BLD AUTO: 188 X10E3/UL (ref 150–450)
POTASSIUM SERPL-SCNC: 4.3 MMOL/L (ref 3.5–5.2)
PROT SERPL-MCNC: 5.8 G/DL (ref 6–8.5)
RBC # BLD AUTO: 3.44 X10E6/UL (ref 3.77–5.28)
SODIUM SERPL-SCNC: 142 MMOL/L (ref 134–144)
TRIGL SERPL-MCNC: 51 MG/DL (ref 0–149)
TSH SERPL DL<=0.005 MIU/L-ACNC: 2.94 UIU/ML (ref 0.45–4.5)
VLDLC SERPL CALC-MCNC: 11 MG/DL (ref 5–40)
WBC # BLD AUTO: 5 X10E3/UL (ref 3.4–10.8)

## 2022-07-13 ENCOUNTER — OFFICE VISIT (OUTPATIENT)
Dept: INTERNAL MEDICINE | Facility: CLINIC | Age: 82
End: 2022-07-13

## 2022-07-13 VITALS
WEIGHT: 141 LBS | HEIGHT: 61 IN | HEART RATE: 62 BPM | BODY MASS INDEX: 26.62 KG/M2 | SYSTOLIC BLOOD PRESSURE: 130 MMHG | OXYGEN SATURATION: 94 % | TEMPERATURE: 97.5 F | DIASTOLIC BLOOD PRESSURE: 70 MMHG

## 2022-07-13 DIAGNOSIS — E78.2 MIXED HYPERLIPIDEMIA: ICD-10-CM

## 2022-07-13 DIAGNOSIS — I10 PRIMARY HYPERTENSION: Primary | ICD-10-CM

## 2022-07-13 DIAGNOSIS — D64.9 ANEMIA, UNSPECIFIED TYPE: ICD-10-CM

## 2022-07-13 PROCEDURE — 99214 OFFICE O/P EST MOD 30 MIN: CPT | Performed by: FAMILY MEDICINE

## 2022-07-13 RX ORDER — HYDROCHLOROTHIAZIDE 25 MG/1
25 TABLET ORAL DAILY
Qty: 30 TABLET | Refills: 1 | Status: SHIPPED | OUTPATIENT
Start: 2022-07-13 | End: 2022-07-15

## 2022-07-13 RX ORDER — LOSARTAN POTASSIUM 25 MG/1
25 TABLET ORAL DAILY
Qty: 30 TABLET | Refills: 1 | Status: SHIPPED | OUTPATIENT
Start: 2022-07-13 | End: 2022-07-13

## 2022-07-13 RX ORDER — LOSARTAN POTASSIUM 25 MG/1
25 TABLET ORAL DAILY
Qty: 30 TABLET | Refills: 1 | Status: SHIPPED | OUTPATIENT
Start: 2022-07-13 | End: 2022-07-15

## 2022-07-13 RX ORDER — HYDROCHLOROTHIAZIDE 25 MG/1
25 TABLET ORAL DAILY
Qty: 30 TABLET | Refills: 1 | Status: SHIPPED | OUTPATIENT
Start: 2022-07-13 | End: 2022-07-13

## 2022-07-13 NOTE — PROGRESS NOTES
Subjective   Kristie Franz is a 81 y.o. female.   Chief Complaint   Patient presents with   • Hypertension       History of Present Illness     1.  Hypertension- patient is on HCTZ and 12.5 mg a day, losartan 50 mg a day and atenolol at 25 mg a day.    Atenolol was added by her cardiologist for palpitations while HCTZ was discontinued.  She saw Dr. Thomason at 6/22.  She complained of lower extremity edema and was evaluated in our office on 7/5/2022.  Doppler was negative for DVT.  She was put back on lower dose of HCTZ and dose of losartan was decreased to 50 from 100.  She said the swelling got better, but she still has some.  She has no lightheadedness, no chest pain, no palpitations.  No extremity edema when she wakes up in the mornings.    2.  Hyperlipidemia- on Crestor 10 mg a day.  She takes it every day.  She has no side effects.  No muscle aches, no muscle cramps.  LDL 59, HDL 69.   LFTs normal.    3.  Anemia-hemoglobin 10.8.  She reports no blood in stool, no black stools, no blood in urine.    The following portions of the patient's history were reviewed and updated as appropriate: allergies, current medications, past family history, past medical history, past social history, past surgical history and problem list.    Review of Systems   Constitutional: Negative.    Respiratory: Negative for shortness of breath.    Cardiovascular: Positive for leg swelling. Negative for chest pain and palpitations.   Gastrointestinal: Negative for blood in stool.   Genitourinary: Negative for hematuria.   Neurological: Negative for light-headedness.         Objective   Wt Readings from Last 3 Encounters:   07/13/22 64 kg (141 lb)   07/05/22 67 kg (147 lb 12.8 oz)   06/22/22 62.6 kg (138 lb)      Vitals:    07/13/22 0952   BP: 130/70   Pulse: 62   Temp: 97.5 °F (36.4 °C)   SpO2: 94%     Temp Readings from Last 3 Encounters:   07/13/22 97.5 °F (36.4 °C)   07/05/22 97.5 °F (36.4 °C)   03/21/22 97.3 °F (36.3 °C)     BP Readings  from Last 3 Encounters:   07/13/22 130/70   07/05/22 140/78   06/22/22 98/72     Pulse Readings from Last 3 Encounters:   07/13/22 62   07/05/22 57   06/22/22 59     Body mass index is 26.66 kg/m².    Physical Exam  Constitutional:       Appearance: Normal appearance. She is well-developed.   HENT:      Head: Normocephalic and atraumatic.   Neck:      Thyroid: No thyromegaly.      Vascular: No carotid bruit.   Cardiovascular:      Rate and Rhythm: Normal rate and regular rhythm.      Heart sounds: Normal heart sounds.   Pulmonary:      Effort: Pulmonary effort is normal.      Breath sounds: Normal breath sounds.   Musculoskeletal:      Cervical back: Neck supple.   Skin:     General: Skin is warm and dry.   Neurological:      Mental Status: She is alert and oriented to person, place, and time.   Psychiatric:         Behavior: Behavior normal.         Assessment & Plan   Diagnoses and all orders for this visit:    1. Primary hypertension (Primary)  -     Basic Metabolic Panel; Future    2. Mixed hyperlipidemia    3. Anemia, unspecified type  -     CBC (No Diff); Future  -     Iron Profile; Future  -     Vitamin B12 & Folate; Future  -     Ferritin; Future    Other orders  -     hydroCHLOROthiazide (HYDRODIURIL) 25 MG tablet; Take 1 tablet by mouth Daily.  Dispense: 30 tablet; Refill: 1  -     losartan (COZAAR) 25 MG tablet; Take 1 tablet by mouth Daily.  Dispense: 30 tablet; Refill: 1        1.  Hypertension- blood pressure is controlled, but patient complains of lower extremity edema.  We are increasing dose of HCTZ to 25 mg a day.  We are decreasing dose of losartan to 25 mg a day.  Continue current dose of atenolol.  Recommended compression stockings.  Follow-up in 1 month.    2.  HLP-continue current treatment.  Follow-up in 6 months.  3.  Anemia- new problem.  We will recheck CBC in 3 weeks.  Follow-up in 1 month.

## 2022-07-15 RX ORDER — HYDROCHLOROTHIAZIDE 25 MG/1
25 TABLET ORAL DAILY
Qty: 90 TABLET | Refills: 1 | Status: SHIPPED | OUTPATIENT
Start: 2022-07-15 | End: 2022-09-23 | Stop reason: SDUPTHER

## 2022-07-15 RX ORDER — LOSARTAN POTASSIUM 25 MG/1
25 TABLET ORAL DAILY
Qty: 90 TABLET | Refills: 1 | Status: SHIPPED | OUTPATIENT
Start: 2022-07-15

## 2022-07-29 RX ORDER — LOSARTAN POTASSIUM 100 MG/1
100 TABLET ORAL DAILY
Qty: 90 TABLET | Refills: 1 | OUTPATIENT
Start: 2022-07-29

## 2022-08-10 ENCOUNTER — OFFICE VISIT (OUTPATIENT)
Dept: INTERNAL MEDICINE | Facility: CLINIC | Age: 82
End: 2022-08-10

## 2022-08-10 VITALS
SYSTOLIC BLOOD PRESSURE: 132 MMHG | DIASTOLIC BLOOD PRESSURE: 62 MMHG | HEART RATE: 56 BPM | HEIGHT: 61 IN | BODY MASS INDEX: 26.45 KG/M2 | OXYGEN SATURATION: 96 % | TEMPERATURE: 98.2 F | WEIGHT: 140.1 LBS

## 2022-08-10 DIAGNOSIS — D64.9 ANEMIA, UNSPECIFIED TYPE: ICD-10-CM

## 2022-08-10 DIAGNOSIS — I10 PRIMARY HYPERTENSION: Primary | ICD-10-CM

## 2022-08-10 DIAGNOSIS — F41.9 ANXIETY: ICD-10-CM

## 2022-08-10 PROCEDURE — 99214 OFFICE O/P EST MOD 30 MIN: CPT | Performed by: FAMILY MEDICINE

## 2022-08-10 NOTE — PROGRESS NOTES
Subjective   Kristie Franz is a 81 y.o. female.   Chief Complaint   Patient presents with   • Hypertension       History of Present Illness        1.  Hypertension- patient is on HCTZ and 25 mg a day, losartan 25 mg a day and atenolol at 25 mg a day.  We adjusted dose of HCTZ and losartan at last office visit to help with lower extremity edema.  She said that it helped a lot.  Atenolol was added by her cardiologist for palpitations.  She says that she continues to have palpitations, especially when she lays down.  It is regular.  She has no chest pain, no shortness of breath associated.  No dizziness.  She is scheduled for follow-up with her cardiologist next week. She has occasional lightheadedness when she gets up.  She monitors blood pressure at home and it stays in 100-130 over 60s to 70s.     2.  Anemia- hemoglobin at 12.9 from 10.8.  Normal iron, B12 and folate. She reports no blood in stool, no black stools, no blood in urine.    2.  Anxiety- she was diagnosed more than 10 years ago.  She is on citalopram 20 mg a day.  She takes it every day.  She has no side effects.  No suicidal ideation, no aggressive behaviors.  Mood is normal.  No excessive worries.  She would like to be weaned off.  PHQ-9 at 3, BISHNU-7 at 2.    The following portions of the patient's history were reviewed and updated as appropriate: allergies, current medications, past family history, past medical history, past social history, past surgical history and problem list.    Review of Systems   Constitutional: Negative.    Respiratory: Negative.    Cardiovascular: Negative.    Gastrointestinal: Negative for blood in stool.   Psychiatric/Behavioral: Negative for suicidal ideas. The patient is not nervous/anxious.          Objective   Wt Readings from Last 3 Encounters:   08/10/22 63.5 kg (140 lb 1.6 oz)   07/13/22 64 kg (141 lb)   07/05/22 67 kg (147 lb 12.8 oz)      Vitals:    08/10/22 1455   BP: 132/62   Pulse: 56   Temp: 98.2 °F (36.8 °C)    SpO2: 96%     Temp Readings from Last 3 Encounters:   08/10/22 98.2 °F (36.8 °C)   07/13/22 97.5 °F (36.4 °C)   07/05/22 97.5 °F (36.4 °C)     BP Readings from Last 3 Encounters:   08/10/22 132/62   07/13/22 130/70   07/05/22 140/78     Pulse Readings from Last 3 Encounters:   08/10/22 56   07/13/22 62   07/05/22 57     Body mass index is 26.49 kg/m².    Physical Exam  Constitutional:       Appearance: She is well-developed.   HENT:      Head: Normocephalic and atraumatic.   Neck:      Thyroid: No thyromegaly.      Vascular: No carotid bruit.   Cardiovascular:      Rate and Rhythm: Normal rate and regular rhythm.      Heart sounds: Normal heart sounds.   Pulmonary:      Effort: Pulmonary effort is normal.      Breath sounds: Normal breath sounds.   Musculoskeletal:      Cervical back: Neck supple.   Skin:     General: Skin is warm and dry.   Neurological:      Mental Status: She is alert and oriented to person, place, and time.   Psychiatric:         Behavior: Behavior normal.         Assessment & Plan   Diagnoses and all orders for this visit:    1. Primary hypertension (Primary)    2. Anemia, unspecified type    3. Anxiety        1.  Hypertension-we will continue current treatment.  Follow-up in 3-6 months.  2.  Anemia- resolved.  We will continue to monitor.  3.  Anxiety-patient would like to be weaned off.  I warned her that if anxiety is contributing to palpitations her symptoms can get worse, but she would like to do weaning off anyway.  She will use half tablet for 1 month, then one fourth of the tablet for 1 month.  She will see me in 3 months, or sooner if problems.

## 2022-09-08 RX ORDER — LEVOTHYROXINE SODIUM 0.05 MG/1
TABLET ORAL
Qty: 90 TABLET | Refills: 0 | Status: SHIPPED | OUTPATIENT
Start: 2022-09-08 | End: 2022-10-12 | Stop reason: SDUPTHER

## 2022-09-21 ENCOUNTER — OFFICE VISIT (OUTPATIENT)
Dept: CARDIOLOGY | Facility: CLINIC | Age: 82
End: 2022-09-21

## 2022-09-21 VITALS
DIASTOLIC BLOOD PRESSURE: 88 MMHG | BODY MASS INDEX: 26.81 KG/M2 | HEART RATE: 43 BPM | SYSTOLIC BLOOD PRESSURE: 110 MMHG | HEIGHT: 61 IN | WEIGHT: 142 LBS

## 2022-09-21 DIAGNOSIS — I10 PRIMARY HYPERTENSION: ICD-10-CM

## 2022-09-21 DIAGNOSIS — I47.1 ATRIAL TACHYCARDIA: Primary | ICD-10-CM

## 2022-09-21 PROBLEM — I47.19 ATRIAL TACHYCARDIA: Status: ACTIVE | Noted: 2022-09-21

## 2022-09-21 PROCEDURE — 93000 ELECTROCARDIOGRAM COMPLETE: CPT | Performed by: NURSE PRACTITIONER

## 2022-09-21 PROCEDURE — 99213 OFFICE O/P EST LOW 20 MIN: CPT | Performed by: NURSE PRACTITIONER

## 2022-09-21 RX ORDER — ATENOLOL 25 MG/1
12.5 TABLET ORAL DAILY
Qty: 90 TABLET | Refills: 3 | Status: SHIPPED | OUTPATIENT
Start: 2022-09-21
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
## 2022-09-23 RX ORDER — HYDROCHLOROTHIAZIDE 25 MG/1
25 TABLET ORAL DAILY
Qty: 90 TABLET | Refills: 1 | Status: SHIPPED | OUTPATIENT
Start: 2022-09-23 | End: 2023-03-27

## 2022-10-12 ENCOUNTER — OFFICE VISIT (OUTPATIENT)
Dept: INTERNAL MEDICINE | Facility: CLINIC | Age: 82
End: 2022-10-12

## 2022-10-12 VITALS
WEIGHT: 142 LBS | BODY MASS INDEX: 26.81 KG/M2 | HEART RATE: 68 BPM | SYSTOLIC BLOOD PRESSURE: 126 MMHG | HEIGHT: 61 IN | TEMPERATURE: 97.1 F | OXYGEN SATURATION: 98 % | DIASTOLIC BLOOD PRESSURE: 70 MMHG

## 2022-10-12 DIAGNOSIS — E03.9 ACQUIRED HYPOTHYROIDISM: ICD-10-CM

## 2022-10-12 DIAGNOSIS — Z12.31 ENCOUNTER FOR SCREENING MAMMOGRAM FOR MALIGNANT NEOPLASM OF BREAST: ICD-10-CM

## 2022-10-12 DIAGNOSIS — I10 PRIMARY HYPERTENSION: Primary | ICD-10-CM

## 2022-10-12 PROCEDURE — 99214 OFFICE O/P EST MOD 30 MIN: CPT | Performed by: FAMILY MEDICINE

## 2022-10-12 RX ORDER — LEVOTHYROXINE SODIUM 0.05 MG/1
50 TABLET ORAL DAILY
Qty: 90 TABLET | Refills: 3 | Status: SHIPPED | OUTPATIENT
Start: 2022-10-12 | End: 2022-12-08 | Stop reason: SDUPTHER

## 2022-10-12 RX ORDER — ROSUVASTATIN CALCIUM 10 MG/1
10 TABLET, COATED ORAL DAILY
Qty: 90 TABLET | Refills: 1 | Status: SHIPPED | OUTPATIENT
Start: 2022-10-12 | End: 2022-10-24

## 2022-10-12 NOTE — PROGRESS NOTES
Subjective   Kristie Franz is a 81 y.o. female.   Chief Complaint   Patient presents with   • Hypertension   • Hypothyroidism       History of Present Illness     1.  Hypertension- patient is on HCTZ and 25 mg a day, losartan 25 mg a day and atenolol at 25 mg a day.  She takes it every day as prescribed.  She has no side effects.  She has no chest pain, no shortness of breath, no lightheadedness.  Occasionally she has palpitations.  She saw her cardiologist last month.  Last creatinine 0.94, GFR 61.    2. Hypothyroidism-she has no history of thyroid surgery.  She is on levothyroxine 50 mcg a day.  She takes it every day on empty stomach and does not eat for at least 30 minutes after taking it.  TSH is at 2.94.    Anxiety- at last office visit we started weaning off citalopram.  Patient finished weaning off 1 week ago.  So far she has no problems.  No anxiety.  No increase in palpitations.    The following portions of the patient's history were reviewed and updated as appropriate: allergies, current medications, past medical history, past social history and problem list.    Review of Systems   Respiratory: Negative for shortness of breath.    Cardiovascular: Positive for palpitations. Negative for chest pain.   Musculoskeletal: Negative for myalgias.   Neurological: Negative for light-headedness.         Objective   Wt Readings from Last 3 Encounters:   10/12/22 64.4 kg (142 lb)   09/21/22 64.4 kg (142 lb)   08/10/22 63.5 kg (140 lb 1.6 oz)      Vitals:    10/12/22 1306   BP: 126/70   Pulse: 68   Temp: 97.1 °F (36.2 °C)   SpO2: 98%     Temp Readings from Last 3 Encounters:   10/12/22 97.1 °F (36.2 °C)   08/10/22 98.2 °F (36.8 °C)   07/13/22 97.5 °F (36.4 °C)     BP Readings from Last 3 Encounters:   10/12/22 126/70   09/21/22 110/88   08/10/22 132/62     Pulse Readings from Last 3 Encounters:   10/12/22 68   09/21/22 (!) 43   08/10/22 56     Body mass index is 26.84 kg/m².    Physical Exam  Constitutional:        Appearance: Normal appearance. She is well-developed.   HENT:      Head: Normocephalic and atraumatic.   Neck:      Thyroid: No thyromegaly.      Vascular: No carotid bruit.   Cardiovascular:      Rate and Rhythm: Normal rate and regular rhythm.      Heart sounds: Normal heart sounds.   Pulmonary:      Effort: Pulmonary effort is normal.      Breath sounds: Normal breath sounds.   Skin:     General: Skin is warm and dry.   Neurological:      Mental Status: She is alert and oriented to person, place, and time.   Psychiatric:         Behavior: Behavior normal.         Assessment & Plan   Diagnoses and all orders for this visit:    1. Primary hypertension (Primary)    2. Acquired hypothyroidism    3. Encounter for screening mammogram for malignant neoplasm of breast  -     Mammo Screening Digital Tomosynthesis Bilateral With CAD; Future    Other orders  -     levothyroxine (SYNTHROID, LEVOTHROID) 50 MCG tablet; Take 1 tablet by mouth Daily.  Dispense: 90 tablet; Refill: 3  -     rosuvastatin (Crestor) 10 MG tablet; Take 1 tablet by mouth Daily.  Dispense: 90 tablet; Refill: 1        1.  Hypertension-continue current treatment.  Follow-up in 6 months.  2.  Hypothyroidism-continue current treatment.  Follow-up in 12 months.    Patient would like to have Medicare wellness visit done in November.  We will schedule her.  No labs needed prior to it.

## 2022-10-24 RX ORDER — ROSUVASTATIN CALCIUM 10 MG/1
TABLET, COATED ORAL
Qty: 90 TABLET | Refills: 1 | Status: SHIPPED | OUTPATIENT
Start: 2022-10-24

## 2022-10-25 RX ORDER — CITALOPRAM 20 MG/1
20 TABLET ORAL DAILY
Qty: 90 TABLET | Refills: 1 | OUTPATIENT
Start: 2022-10-25

## 2022-11-03 ENCOUNTER — TELEPHONE (OUTPATIENT)
Dept: INTERNAL MEDICINE | Facility: CLINIC | Age: 82
End: 2022-11-03

## 2022-11-03 NOTE — TELEPHONE ENCOUNTER
Caller: Kristie Franz    Relationship: Self    Best call back number: [phone]    What medication are you requesting: TO START THE CITALOPRAM 10MG AGAIN OR SOMETHING COMPARABLE.  SHE DOES HAVE ADDITIONAL 20 MG TABLETS OF THIS MEDICATION, CAN SHE CUT THESE IN HALF TO BEGIN?    What are your current symptoms: PATIENT HAS BEEN OFF OF THIS MEDICATION FOR ABOUT  1 MONTH NOW AND IS FINDING HERSELF VERY ANXIOUS.  SHE WOULD LIKE TO START THIS MEDICATION AGAIN.    How long have you been experiencing symptoms: PATIENT HAS LOST A VERY DEAR FRIEND AND ISN'T HANDLING IT WELL.      Have you had these symptoms before:    [x] Yes  [] No    Have you been treated for these symptoms before:   [x] Yes  [] No    If a prescription is needed, what is your preferred pharmacy and phone number:  [id number] [phone]    Additional notes:  PATIENT WANTS THE LOWEST DOSAGE.

## 2022-11-04 ENCOUNTER — OFFICE VISIT (OUTPATIENT)
Dept: INTERNAL MEDICINE | Facility: CLINIC | Age: 82
End: 2022-11-04

## 2022-11-04 VITALS
SYSTOLIC BLOOD PRESSURE: 110 MMHG | TEMPERATURE: 97.7 F | HEIGHT: 61 IN | OXYGEN SATURATION: 96 % | HEART RATE: 71 BPM | BODY MASS INDEX: 26.62 KG/M2 | WEIGHT: 141 LBS | DIASTOLIC BLOOD PRESSURE: 60 MMHG

## 2022-11-04 DIAGNOSIS — F41.8 SITUATIONAL ANXIETY: Primary | ICD-10-CM

## 2022-11-04 PROCEDURE — 99213 OFFICE O/P EST LOW 20 MIN: CPT | Performed by: FAMILY MEDICINE

## 2022-11-04 RX ORDER — HYDROXYZINE HYDROCHLORIDE 25 MG/1
25 TABLET, FILM COATED ORAL 3 TIMES DAILY PRN
Qty: 21 TABLET | Refills: 0 | Status: SHIPPED | OUTPATIENT
Start: 2022-11-04 | End: 2022-12-05

## 2022-11-04 RX ORDER — CITALOPRAM 10 MG/1
10 TABLET ORAL DAILY
Qty: 30 TABLET | Refills: 1 | Status: SHIPPED | OUTPATIENT
Start: 2022-11-04 | End: 2022-12-05 | Stop reason: SDUPTHER

## 2022-11-04 NOTE — PROGRESS NOTES
Subjective   Kristie Franz is a 81 y.o. female.   Chief Complaint   Patient presents with   • Anxiety       History of Present Illness     1.  Anxiety- patient is here because of an increase in anxiety.  She lost her close friends last week.  It was sudden.  It brought up all the memories of when she lost her .  She is irritable and very nervous, she cannot relax.  PHQ-9 at 5, BISHNU-7 at 11.  No suicidal ideation or aggressive behaviors.    She was previously treated with citalopram for anxiety.  She wanted to wean it off in summary this year.  She was doing okay for months until she lost her friend.    The following portions of the patient's history were reviewed and updated as appropriate: allergies, current medications, past medical history, past social history and problem list.    Review of Systems   Constitutional: Negative.    Respiratory: Negative.    Cardiovascular: Negative.    Psychiatric/Behavioral: The patient is nervous/anxious.          Objective   Wt Readings from Last 3 Encounters:   11/04/22 64 kg (141 lb)   10/12/22 64.4 kg (142 lb)   09/21/22 64.4 kg (142 lb)      Vitals:    11/04/22 1532   BP: 110/60   Pulse: 71   Temp: 97.7 °F (36.5 °C)   SpO2: 96%     Temp Readings from Last 3 Encounters:   11/04/22 97.7 °F (36.5 °C)   10/12/22 97.1 °F (36.2 °C)   08/10/22 98.2 °F (36.8 °C)     BP Readings from Last 3 Encounters:   11/04/22 110/60   10/12/22 126/70   09/21/22 110/88     Pulse Readings from Last 3 Encounters:   11/04/22 71   10/12/22 68   09/21/22 (!) 43     Body mass index is 26.66 kg/m².    Physical Exam  Constitutional:       Appearance: She is well-developed.   Neck:      Thyroid: No thyromegaly.   Cardiovascular:      Rate and Rhythm: Normal rate and regular rhythm.      Heart sounds: Normal heart sounds.   Pulmonary:      Effort: Pulmonary effort is normal.      Breath sounds: Normal breath sounds.   Skin:     General: Skin is warm and dry.   Neurological:      Mental Status: She is  alert and oriented to person, place, and time.   Psychiatric:         Behavior: Behavior normal.         Assessment & Plan   Diagnoses and all orders for this visit:    1. Situational anxiety (Primary)    Other orders  -     citalopram (CeleXA) 10 MG tablet; Take 1 tablet by mouth Daily.  Dispense: 30 tablet; Refill: 1  -     hydrOXYzine (ATARAX) 25 MG tablet; Take 1 tablet by mouth 3 (Three) Times a Day As Needed for Anxiety.  Dispense: 21 tablet; Refill: 0        1.  Situational anxiety- we are starting citalopram at 10 mg a day and hydroxyzine 25 mg to be used as needed, short-term.  Side effects reviewed.  Follow-up in 1 month.

## 2022-11-28 ENCOUNTER — HOSPITAL ENCOUNTER (OUTPATIENT)
Dept: MAMMOGRAPHY | Facility: HOSPITAL | Age: 82
Discharge: HOME | End: 2022-11-28
Admitting: FAMILY MEDICINE

## 2022-11-28 DIAGNOSIS — Z12.31 ENCOUNTER FOR SCREENING MAMMOGRAM FOR MALIGNANT NEOPLASM OF BREAST: ICD-10-CM

## 2022-11-28 PROCEDURE — 77063 BREAST TOMOSYNTHESIS BI: CPT

## 2022-11-28 PROCEDURE — 77067 SCR MAMMO BI INCL CAD: CPT

## 2022-11-30 ENCOUNTER — OFFICE VISIT (OUTPATIENT)
Dept: NEUROLOGY | Facility: CLINIC | Age: 82
End: 2022-11-30

## 2022-11-30 VITALS
DIASTOLIC BLOOD PRESSURE: 64 MMHG | WEIGHT: 141.09 LBS | HEART RATE: 68 BPM | BODY MASS INDEX: 26.64 KG/M2 | HEIGHT: 61 IN | SYSTOLIC BLOOD PRESSURE: 116 MMHG | RESPIRATION RATE: 16 BRPM

## 2022-11-30 DIAGNOSIS — I69.30 SEQUELAE, POST-STROKE: Primary | ICD-10-CM

## 2022-11-30 PROCEDURE — 99212 OFFICE O/P EST SF 10 MIN: CPT | Performed by: STUDENT IN AN ORGANIZED HEALTH CARE EDUCATION/TRAINING PROGRAM

## 2022-12-05 ENCOUNTER — OFFICE VISIT (OUTPATIENT)
Dept: INTERNAL MEDICINE | Facility: CLINIC | Age: 82
End: 2022-12-05

## 2022-12-05 VITALS
SYSTOLIC BLOOD PRESSURE: 106 MMHG | WEIGHT: 140.7 LBS | TEMPERATURE: 97.5 F | DIASTOLIC BLOOD PRESSURE: 62 MMHG | BODY MASS INDEX: 26.56 KG/M2 | HEIGHT: 61 IN | HEART RATE: 55 BPM | OXYGEN SATURATION: 98 %

## 2022-12-05 DIAGNOSIS — Z00.00 MEDICARE ANNUAL WELLNESS VISIT, SUBSEQUENT: Primary | ICD-10-CM

## 2022-12-05 DIAGNOSIS — F41.8 SITUATIONAL ANXIETY: ICD-10-CM

## 2022-12-05 PROCEDURE — 1125F AMNT PAIN NOTED PAIN PRSNT: CPT | Performed by: FAMILY MEDICINE

## 2022-12-05 PROCEDURE — G0439 PPPS, SUBSEQ VISIT: HCPCS | Performed by: FAMILY MEDICINE

## 2022-12-05 PROCEDURE — 1159F MED LIST DOCD IN RCRD: CPT | Performed by: FAMILY MEDICINE

## 2022-12-05 PROCEDURE — 1170F FXNL STATUS ASSESSED: CPT | Performed by: FAMILY MEDICINE

## 2022-12-05 RX ORDER — CITALOPRAM 10 MG/1
10 TABLET ORAL DAILY
Qty: 90 TABLET | Refills: 1 | Status: SHIPPED | OUTPATIENT
Start: 2022-12-05

## 2022-12-05 NOTE — PATIENT INSTRUCTIONS
Medicare Wellness  Personal Prevention Plan of Service     Date of Office Visit:    Encounter Provider:  Mariia Trujillo MD  Place of Service:  [facility] PRIMARY CARE  Patient Name: Kristie Franz  :  1940    As part of the Medicare Wellness portion of your visit today, we are providing you with this personalized preventive plan of services (PPPS). This plan is based upon recommendations of the [place] Preventive Services Task Force (USPSTF) and the Advisory Committee on Immunization Practices (ACIP).    This lists the preventive care services that should be considered, and provides dates of when you are due. Items listed as completed are up-to-date and do not require any further intervention.    Health Maintenance   Topic Date Due    MAMMOGRAM  2022    LIPID PANEL  2023    DXA SCAN  2023    ANNUAL WELLNESS VISIT  2023    COLORECTAL CANCER SCREENING  2025    TDAP/TD VACCINES (2 - Td or Tdap) 10/04/2025    COVID-19 Vaccine  Completed    INFLUENZA VACCINE  Completed    Pneumococcal Vaccine 65+  Completed    ZOSTER VACCINE  Completed       No orders of the defined types were placed in this encounter.      Return in about 6 months (around 2023).
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
## 2022-12-05 NOTE — PROGRESS NOTES
Subjective   Kristie Franz is a 81 y.o. female.   Chief Complaint   Patient presents with   • Medicare Wellness-subsequent   • Anxiety       History of Present Illness     Situational anxiety- at last office visit we started patient on citalopram at 10 mg a day and we prescribed hydroxyzine to be used as needed.  She took citalopram every day, she did not have to use hydroxyzine.  She reports significant improvement.  She is less anxious, less nervous, she can cope better.  She feels back to herself.  No side effects.  No suicidal ideations or aggressive behaviors.  PHQ-9 at 5 from 2, BISHNU-7 is 11 from 0.    The following portions of the patient's history were reviewed and updated as appropriate: allergies, current medications, past family history, past medical history, past social history, past surgical history and problem list.    Review of Systems   Respiratory: Negative.    Cardiovascular: Negative.    Psychiatric/Behavioral: Negative for suicidal ideas. The patient is not nervous/anxious.          Objective   Wt Readings from Last 3 Encounters:   12/05/22 63.8 kg (140 lb 11.2 oz)   11/30/22 64 kg (141 lb 1.5 oz)   11/04/22 64 kg (141 lb)      Vitals:    12/05/22 1316   BP: 106/62   Pulse: 55   Temp: 97.5 °F (36.4 °C)   SpO2: 98%     Temp Readings from Last 3 Encounters:   12/05/22 97.5 °F (36.4 °C) (Temporal)   11/04/22 97.7 °F (36.5 °C)   10/12/22 97.1 °F (36.2 °C)     BP Readings from Last 3 Encounters:   12/05/22 106/62   11/30/22 116/64   11/04/22 110/60     Pulse Readings from Last 3 Encounters:   12/05/22 55   11/30/22 68   11/04/22 71     Body mass index is 26.6 kg/m².    Physical Exam  Constitutional:       Appearance: She is well-developed.   Neck:      Thyroid: No thyromegaly.   Cardiovascular:      Rate and Rhythm: Normal rate and regular rhythm.      Heart sounds: Normal heart sounds.   Pulmonary:      Effort: Pulmonary effort is normal.      Breath sounds: Normal breath sounds.   Skin:     General:  Skin is warm and dry.   Neurological:      Mental Status: She is alert and oriented to person, place, and time.   Psychiatric:         Behavior: Behavior normal.         Assessment & Plan   Diagnoses and all orders for this visit:    1. Medicare annual wellness visit, subsequent (Primary)    2. Situational anxiety    Other orders  -     citalopram (CeleXA) 10 MG tablet; Take 1 tablet by mouth Daily.  Dispense: 90 tablet; Refill: 1        1.  Situational anxiety-we will continue current treatment.  Follow-up in 6 months.

## 2022-12-08 ENCOUNTER — TELEPHONE (OUTPATIENT)
Dept: INTERNAL MEDICINE | Facility: CLINIC | Age: 82
End: 2022-12-08

## 2022-12-08 RX ORDER — LEVOTHYROXINE SODIUM 0.05 MG/1
50 TABLET ORAL DAILY
Qty: 90 TABLET | Refills: 3 | Status: SHIPPED | OUTPATIENT
Start: 2022-12-08

## 2022-12-08 NOTE — TELEPHONE ENCOUNTER
Caller: Kristie Franz    Relationship: Self    Best call back number: [phone]     Caller requesting test results:     What test was performed: MAMMOGRAM     When was the test performed: 11/28/22    Where was the test performed: [facility]    Additional notes: PATIENT CALLING WANTING TO KNOW IF THE RESULTS HAVE COME BACK YET

## 2022-12-08 NOTE — TELEPHONE ENCOUNTER
Caller: Kristie Franz    Relationship: Self    Best call back number: [phone]     Requested Prescriptions:   Requested Prescriptions     Pending Prescriptions Disp Refills   • levothyroxine (SYNTHROID, LEVOTHROID) 50 MCG tablet 90 tablet 3     Sig: Take 1 tablet by mouth Daily.        Pharmacy where request should be sent: [id number] [address] AT [facility] - [phone] [facility] [phone] FX     Additional details provided by patient: PATIENT HAS 3 DAYS LEFT     Does the patient have less than a 3 day supply:  [] Yes  [x] No    Would you like a call back once the refill request has been completed: [x] Yes [] No    If the office needs to give you a call back, can they leave a voicemail: [x] Yes [] No    Norm Lo Rep   12/08/22 11:09 EST

## 2022-12-09 DIAGNOSIS — R92.8 ABNORMAL MAMMOGRAM: Primary | ICD-10-CM

## 2022-12-21 DIAGNOSIS — R92.8 ABNORMAL MAMMOGRAM: Primary | ICD-10-CM

## 2022-12-29 ENCOUNTER — APPOINTMENT (OUTPATIENT)
Dept: ULTRASOUND IMAGING | Facility: HOSPITAL | Age: 82
End: 2022-12-29

## 2023-01-24 ENCOUNTER — HOSPITAL ENCOUNTER (OUTPATIENT)
Dept: ULTRASOUND IMAGING | Facility: HOSPITAL | Age: 83
Discharge: HOME | End: 2023-01-24
Admitting: FAMILY MEDICINE
Payer: MEDICARE

## 2023-01-24 ENCOUNTER — HOSPITAL ENCOUNTER (OUTPATIENT)
Dept: MAMMOGRAPHY | Facility: HOSPITAL | Age: 83
Discharge: HOME | End: 2023-01-24
Admitting: FAMILY MEDICINE
Payer: MEDICARE

## 2023-01-24 DIAGNOSIS — R92.8 ABNORMAL MAMMOGRAM: ICD-10-CM

## 2023-01-24 PROCEDURE — 77066 DX MAMMO INCL CAD BI: CPT

## 2023-01-24 PROCEDURE — G0279 TOMOSYNTHESIS, MAMMO: HCPCS

## 2023-01-24 PROCEDURE — 76642 ULTRASOUND BREAST LIMITED: CPT

## 2023-01-25 DIAGNOSIS — R92.8 ABNORMAL MAMMOGRAM: Primary | ICD-10-CM

## 2023-01-26 ENCOUNTER — TELEPHONE (OUTPATIENT)
Dept: SURGERY | Facility: CLINIC | Age: 83
End: 2023-01-26
Payer: MEDICARE

## 2023-01-26 NOTE — PROGRESS NOTES
[facility]     Referring Provider: Mariia Trujillo MD     Chief complaint: abnormal breast imaging     HPI: Ms. Kristie Franz is a 83 yo woman, seen at the request of Mariia Trujillo MD, for abnormal breast imaging    I personally reviewed her records and summarized her relevant breast history/imagin2021 bilateral screening mammogram medical [facility]  The breasts are extremely dense.  There is a focal asymmetry seen in the upper outer region of the left breast.  There are no significant changes from the prior exam.  The parenchyma include stable nodular asymmetries.  No suspicious masses suspicious microcalcifications or area of architectural distortion identified.  In the right breast no suspicious masses significant calcifications or other arm abnormalities are seen.  Impression  Focal asymmetry in the left breast is benign negative.  Screening mammogram in 1 year.  BI-RADS 2    2022 screening mammogram at [facility]  FINDINGS:  The breasts are heterogeneously dense, which may obscure small masses.  There is questioned architectural distortion projecting slightly below  the nipple in the anterior right breast on the MLO view. There is  questioned architectural distortion in the upper outer posterior left  breast. These are best appreciated on Tomosynthesis.  There are no suspicious masses or calcifications in either breast.  IMPRESSION/RECOMMENDATION(S):  Questioned bilateral architectural distortion. Recommend further  evaluation with left CC and bilateral MLO spot compression and bilateral  lateral views with Tomosynthesis, targeted ultrasound on the left, and  possible targeted ultrasound on the right.  BI-RADS Category 0:    2023 diagnostic bilateral mammogram bilateral Limited ultrasound at [facility]  FINDINGS:  MAMMOGRAM: Bilateral MLO and CC spot compression and bilateral lateral  2-D and Tomosynthesis views were obtained.    The breasts are heterogeneously  dense, which may obscure small masses.   In the outer central posterior left breast there is persistent mild  architectural distortion.   Previously questioned architectural distortion projecting slightly below  the nipple in the anterior right breast partially effaces with spot  compression and localizes to the outer breast on Tomosynthesis.  These areas were further assessed with ultrasound.  ULTRASOUND:  Targeted sonographic evaluation of the left breast was performed from  2:00 to 4:00 in the region of mammographic abnormality. At 2:00, 2 cm  from the nipple, there is a 0.6 cm benign-appearing cyst. At 2:00, 6 cm  from the nipple, there is a 2.1 x 1.0 x 1.9 cm oval hypoechoic mass  corresponding to a mammographically stable focal asymmetry, which can be  considered benign. This is in the region of the mammographic distortion,  and there is slightly irregular tissue adjacent to the mass, however, a  discrete correlate for the mammographic distortion is not clearly  visualized.  Targeted sonographic evaluation of the right breast was performed from  5:00 to 9:00 and retroareolar in the region of the mammographic  abnormality. At 8:00, 3 cm from nipple, there is a 0.3 cm  benign-appearing cyst. In the retroareolar right breast, there is a 0.9  x 0.5 x 1.1 cm benign-appearing minimally complicated cyst.  IMPRESSION:  1.  Suspicious outer posterior left breast architectural distortion  without a discrete sonographic correlate. Recommend further evaluation  with stereotactic/Tomosynthesis guided core needle biopsy.  2.  No evidence of malignancy in the right breast.  Findings and recommendations were discussed with the patient in person  at the time of her examination. An Epic in basket message was sent to  Dr. Trujillo on 1/24/2023.  BI-RADS Category 4: Suspicious      She has a personal history of stroke, hypertension, hyperlipidemia, hypothyroidism, atrial tachycardia, right breast cyst aspiration unknown year    She  denies any family history of breast or ovarian cancer.     Today she presents with concerns regarding recent mammogram results and need of biopsy  She denies any breast lumps, pain, skin changes, or nipple discharge.  She denies any prior history of abnormal mammograms or breast biopsies.     She was by herself in clinic today.       Review of Systems   All other systems reviewed and are negative.      Medications:    Current Outpatient Medications:   •  aspirin (aspirin) 81 MG EC tablet, Take 1 tablet by mouth Daily., Disp: , Rfl:   •  atenolol (TENORMIN) 25 MG tablet, Take 0.5 tablets by mouth Daily., Disp: 90 tablet, Rfl: 3  •  citalopram (CeleXA) 10 MG tablet, Take 1 tablet by mouth Daily., Disp: 90 tablet, Rfl: 1  •  hydroCHLOROthiazide (HYDRODIURIL) 25 MG tablet, Take 1 tablet by mouth Daily., Disp: 90 tablet, Rfl: 1  •  levothyroxine (SYNTHROID, LEVOTHROID) 50 MCG tablet, Take 1 tablet by mouth Daily., Disp: 90 tablet, Rfl: 3  •  losartan (COZAAR) 25 MG tablet, TAKE 1 TABLET BY MOUTH DAILY, Disp: 90 tablet, Rfl: 1  •  rosuvastatin (CRESTOR) 10 MG tablet, TAKE 1 TABLET BY MOUTH EVERY DAY, Disp: 90 tablet, Rfl: 1    Allergies:  Allergies   Allergen Reactions   • Amoxicillin Rash       Medical history:  Past Medical History:   Diagnosis Date   • Allergic Amoxicillin    reaction many years ago   • Allergic rhinitis    • Anemia    • Anxiety     low dosage med   • Arthritis    • Back pain    • Depression Since 's death   • Fatigue    • Hyperlipidemia    • Hypertension    • Hypothyroidism    • Insomnia    • Joint pain    • Migraine    • Mood disorder (HCC)    • Osteopenia    • PSVT (paroxysmal supraventricular tachycardia) (HCC)    • Urinary frequency        Surgical History:  Past Surgical History:   Procedure Laterality Date   • BREAST CYST ASPIRATION Right    • COLONOSCOPY     • TOTAL KNEE ARTHROPLASTY Right 04/08/2019    Procedure: TOTAL KNEE ARTHROPLASTY;  Surgeon: Trevon Hunt MD;  Location: [facility]  "MAIN OR;  Service: Orthopedics   • WISDOM TOOTH EXTRACTION         Family History:  Family History   Problem Relation Age of Onset   • Diabetes Mother    • Hypertension Mother    • Arthritis Mother    • Heart attack Father          age 67   • Heart disease Father         Heart Attack   • Cancer Brother    • Colon polyps Daughter    • Colon polyps Daughter    • Malig Hyperthermia Neg Hx    • Colon cancer Neg Hx    • Crohn's disease Neg Hx    • Inflammatory bowel disease Neg Hx    • Ulcerative colitis Neg Hx        Social History:   Social History     Socioeconomic History   • Marital status:    Tobacco Use   • Smoking status: Never   • Smokeless tobacco: Never   Vaping Use   • Vaping Use: Never used   Substance and Sexual Activity   • Alcohol use: Yes     Comment: Seldom; socially   • Drug use: No   • Sexual activity: Never     Patient drinks 2 servings of caffeine per day.       GYNECOLOGIC HISTORY:   G: 3. P: 2. AB: 1.  Last menstrual period: pt unsure \"late 50's\"  Age at menarche: 10  Age at first childbirth: 31  Lactation/How lon  Age at menopause: late 50's  Total years of oral contraceptive use: 0  Total years of hormone replacement therapy: 0      Physical Exam  Vitals:    23 1033   BP: 124/70   Pulse: 68   SpO2: 98%     ECOG 0 - Asymptomatic  General: NAD, well appearing  Psych: a&o x 3, normal mood and affect  Eyes: EOMI, no scleral icterus  ENMT: neck supple without masses or thyromegaly, mucus membranes moist  Resp: normal effort, CTAB  CV: RRR, no murmurs, no edema   GI: soft, NT, ND  MSK: normal gait, normal ROM in bilateral shoulders  Lymph nodes:  no cervical, supraclavicular or axillary lymphadenopathy  Breast: symmetric, medium  Right:  No visible abnormalities on inspection while seated, with arms raised or hands on hips. No masses, skin changes, or nipple abnormalities.  Left:  No visible abnormalities on inspection while seated, with arms raised or hands on hips. No masses, " skin changes, or nipple abnormalities. 1:00 6cmfn 2x1cm mass believe this to correlate with 2:00 mass on mammogram       Assessment:    1)  82 y.o. F with a new diagnosis of abnormal imaging  2) breast cyst, right     Discussion:  1. Stereotactic biopsy- explained procedure to patient in length.   2. We discussed her history of multiple bilateral cysts. I explained that cysts are benign and no intervention is necessary if they are asymptomatic. I also explained that her history of cysts does not increase her lifetime risk of breast cancer      Plan:  1. Left breast stereotactic biopsy in the near future call with results  2. Cont monthly self breast exams     I have advised the patient to continue monthly breast self examination and to return to see me in months after completion of imaging.  She was also advised to notify us if she develops new breast symptoms.       FRED Gómez    I have spent 50 min in face to face time with the patient and in chart review    CC:  MD Cassandra Gomez Joanna M, MD    [facility] Dragon/transcription disclaimer:  Dictated using Dragon dictation

## 2023-01-27 ENCOUNTER — PREP FOR SURGERY (OUTPATIENT)
Dept: OTHER | Facility: HOSPITAL | Age: 83
End: 2023-01-27
Payer: MEDICARE

## 2023-01-27 ENCOUNTER — TELEPHONE (OUTPATIENT)
Dept: SURGERY | Facility: CLINIC | Age: 83
End: 2023-01-27
Payer: MEDICARE

## 2023-01-27 ENCOUNTER — OFFICE VISIT (OUTPATIENT)
Dept: SURGERY | Facility: CLINIC | Age: 83
End: 2023-01-27
Payer: MEDICARE

## 2023-01-27 VITALS
HEIGHT: 61 IN | HEART RATE: 68 BPM | SYSTOLIC BLOOD PRESSURE: 124 MMHG | WEIGHT: 137 LBS | OXYGEN SATURATION: 98 % | BODY MASS INDEX: 25.86 KG/M2 | DIASTOLIC BLOOD PRESSURE: 70 MMHG

## 2023-01-27 DIAGNOSIS — R92.8 ABNORMAL MAMMOGRAM: ICD-10-CM

## 2023-01-27 DIAGNOSIS — N60.01 SOLITARY CYST OF RIGHT BREAST: Primary | ICD-10-CM

## 2023-01-27 DIAGNOSIS — R92.8 ABNORMAL MAMMOGRAM: Primary | ICD-10-CM

## 2023-01-27 PROCEDURE — 99214 OFFICE O/P EST MOD 30 MIN: CPT | Performed by: NURSE PRACTITIONER

## 2023-01-27 NOTE — TELEPHONE ENCOUNTER
Pt notified of the following appt:    Left breast stereotactic biopsy is scheduled at [facility] on 02/03/2023 at 7:30. Jama will call her with those results.     CMA

## 2023-02-03 ENCOUNTER — HOSPITAL ENCOUNTER (OUTPATIENT)
Dept: MAMMOGRAPHY | Facility: HOSPITAL | Age: 83
Discharge: HOME | End: 2023-02-03
Admitting: NURSE PRACTITIONER
Payer: MEDICARE

## 2023-02-03 VITALS
TEMPERATURE: 97.8 F | WEIGHT: 137 LBS | RESPIRATION RATE: 14 BRPM | OXYGEN SATURATION: 98 % | DIASTOLIC BLOOD PRESSURE: 73 MMHG | HEART RATE: 50 BPM | HEIGHT: 61 IN | BODY MASS INDEX: 25.86 KG/M2 | SYSTOLIC BLOOD PRESSURE: 124 MMHG

## 2023-02-03 DIAGNOSIS — R92.8 ABNORMAL MAMMOGRAM: ICD-10-CM

## 2023-02-03 PROCEDURE — 0 LIDOCAINE 1 % SOLUTION: Performed by: NURSE PRACTITIONER

## 2023-02-03 PROCEDURE — 88305 TISSUE EXAM BY PATHOLOGIST: CPT | Performed by: NURSE PRACTITIONER

## 2023-02-03 RX ORDER — DIAZEPAM 5 MG/1
5 TABLET ORAL ONCE AS NEEDED
Status: DISCONTINUED | OUTPATIENT
Start: 2023-02-03 | End: 2023-02-04 | Stop reason: HOSPADM

## 2023-02-03 RX ORDER — LIDOCAINE HYDROCHLORIDE 10 MG/ML
2 INJECTION, SOLUTION INFILTRATION; PERINEURAL ONCE
Status: COMPLETED | OUTPATIENT
Start: 2023-02-03 | End: 2023-02-03

## 2023-02-03 RX ADMIN — LIDOCAINE HYDROCHLORIDE 30 ML: 10; .005 INJECTION, SOLUTION EPIDURAL; INFILTRATION; INTRACAUDAL; PERINEURAL at 09:02

## 2023-02-03 RX ADMIN — Medication 2 ML: at 09:01

## 2023-02-03 NOTE — NURSING NOTE
VSS. Denies pain. Medical/surgical history and medications reviewed. No distress noted. Procedural education completed with patient's questions addressed.

## 2023-02-03 NOTE — H&P
Name: Kristie Franz ADMIT: 2/3/2023   : 1940  PCP: Mariia Trujillo MD    MRN: [id number] LOS: 0 days   AGE/SEX: 82 y.o. female  ROOM: Room/bed info not found       Chief complaint suspected left breast distortion    Present Illness or Internal History:  Patient is a 82 y.o. female presents with suspected left breast distortion.     Past Surgical History:  Past Surgical History:   Procedure Laterality Date   • BREAST CYST ASPIRATION Right    • COLONOSCOPY     • TOTAL KNEE ARTHROPLASTY Right 2019    Procedure: TOTAL KNEE ARTHROPLASTY;  Surgeon: Trevon Hunt MD;  Location: [facility] OR;  Service: Orthopedics   • WISDOM TOOTH EXTRACTION         Past Medical History:  Past Medical History:   Diagnosis Date   • Allergic Amoxicillin    reaction many years ago   • Allergic rhinitis    • Anemia    • Anxiety     low dosage med   • Arthritis    • Back pain    • Depression Since 's death   • Fatigue    • Hyperlipidemia    • Hypertension    • Hypothyroidism    • Insomnia    • Joint pain    • Migraine    • Mood disorder (HCC)    • Osteopenia    • PSVT (paroxysmal supraventricular tachycardia) ([facility])    • Urinary frequency        Home Medications:  (Not in a hospital admission)      Allergies:  Amoxicillin    Family History:  Family History   Problem Relation Age of Onset   • Diabetes Mother    • Hypertension Mother    • Arthritis Mother    • Heart attack Father          age 67   • Heart disease Father         Heart Attack   • Cancer Brother    • Colon polyps Daughter    • Colon polyps Daughter    • Malig Hyperthermia Neg Hx    • Colon cancer Neg Hx    • Crohn's disease Neg Hx    • Inflammatory bowel disease Neg Hx    • Ulcerative colitis Neg Hx        Social History:  Social History     Tobacco Use   • Smoking status: Never   • Smokeless tobacco: Never   Vaping Use   • Vaping Use: Never used   Substance Use Topics   • Alcohol use: Yes     Comment: Seldom; socially   • Drug use: No         Objective     Physical Exam:    No exam performed today,    Vital Signs  Temp:  [97.8 °F (36.6 °C)] 97.8 °F (36.6 °C)  Heart Rate:  [53] 53  Resp:  [16] 16  BP: (113)/(72) 113/72    Anticipated Surgical Procedure:  tomosynthesis guided left breast biopsy with clip placement    The risks, benefits and alternatives of this procedure have been discussed with the patient or responsible party: Yes        Jerrod Mojica Jr., MD  02/03/23  08:29 EST

## 2023-02-03 NOTE — NURSING NOTE
Biopsy site to left outer breast clear with Dermabond dry and intact. No firmness or swelling noted at or around biopsy site. Denies pain. Ice pack with protective covering applied to biopsy site. Discharge instructions discussed with understanding voiced by patient. Copies provided to patient. No distress noted. To home via personal vehicle.

## 2023-02-04 ENCOUNTER — TELEPHONE (OUTPATIENT)
Dept: INTERVENTIONAL RADIOLOGY/VASCULAR | Facility: HOSPITAL | Age: 83
End: 2023-02-04
Payer: MEDICARE

## 2023-02-06 LAB
LAB AP CASE REPORT: NORMAL
LAB AP DIAGNOSIS COMMENT: NORMAL
PATH REPORT.FINAL DX SPEC: NORMAL
PATH REPORT.GROSS SPEC: NORMAL

## 2023-02-07 ENCOUNTER — TELEPHONE (OUTPATIENT)
Dept: SURGERY | Facility: CLINIC | Age: 83
End: 2023-02-07
Payer: MEDICARE

## 2023-02-07 NOTE — TELEPHONE ENCOUNTER
Called pt and gave biopsy results of papilloma.  Please set her up with one of the surgeons in a non cancer spot and let her know appointment details. Thanks

## 2023-02-23 ENCOUNTER — HOSPITAL ENCOUNTER (OUTPATIENT)
Dept: SURGERY | Facility: HOSPITAL | Age: 83
Discharge: HOME | End: 2023-02-23
Payer: MEDICARE

## 2023-02-23 ENCOUNTER — OFFICE VISIT (OUTPATIENT)
Dept: SURGERY | Facility: CLINIC | Age: 83
End: 2023-02-23
Payer: MEDICARE

## 2023-02-23 VITALS
OXYGEN SATURATION: 96 % | HEIGHT: 61 IN | HEART RATE: 52 BPM | RESPIRATION RATE: 16 BRPM | DIASTOLIC BLOOD PRESSURE: 64 MMHG | WEIGHT: 137 LBS | SYSTOLIC BLOOD PRESSURE: 108 MMHG | BODY MASS INDEX: 25.86 KG/M2

## 2023-02-23 DIAGNOSIS — R92.8 ABNORMAL MAMMOGRAM: Primary | ICD-10-CM

## 2023-02-23 DIAGNOSIS — D24.2 INTRADUCTAL PAPILLOMA OF LEFT BREAST: Primary | ICD-10-CM

## 2023-02-23 PROCEDURE — 99215 OFFICE O/P EST HI 40 MIN: CPT | Performed by: SURGERY

## 2023-02-23 NOTE — PROGRESS NOTES
[facility]     Referring Provider: Mariia Trujillo MD     Chief complaint: Left breast intraductal papilloma      Subjective   HPI:   1/27/2023, Visit with FRED Gómez:  Ms. Kristie Franz is a 83 yo woman, seen at the request of Mariia Trujillo MD, for abnormal breast imaging  She has a personal history of stroke, hypertension, hyperlipidemia, hypothyroidism, atrial tachycardia, right breast cyst aspiration unknown year  She denies any family history of breast or ovarian cancer.   Today she presents with concerns regarding recent mammogram results and need of biopsy  She denies any breast lumps, pain, skin changes, or nipple discharge.    2/23/2023, Interval History:  Left stereotactic biopsy showed an intraductal papilloma and she returns today to discuss the results.  She had a bruise and some pain in her left breast after the biopsy, but this is improving.  She was joined today in clinic by her daughter.        Breast history/imaging (updated 2/23/2023):    11/09/2020, Screening MMG ([facility])  The breast are extremely dense.   1. There is a focal stable asymmetry seen in the posterior one-third upper outer region of the left breast. The parenchyma includes multiple other stable nodular asymmetries. No suspicious masses, suspicious micro calcifications or areas of architectural distortion are identified.  2. The parenchyma of the right breasts also includes stable nodular asymmetries. No suspicious masses, suspicious micro calcifications or areas of architectural distortion are identified.  BI-RADS 2: Benign     11/23/2021, Screening MMG ([facility])  The breast are extremely dense.   There is a focal asymmetry seen in the upper outer region of the left breast. There are no significant changes from the prior exam(s). The parenchyma includes stable nodular asymmetries. No suspicious masses, suspicious micro calcifications or areas of architectural distortion are identified.  In the right  breast, no suspicious masses, significant calcifications or other abnormalities are seen.  BI-RADS 2: Benign    11/28/2022, Screening MMG with [facility] ( [facility])  The breasts are heterogeneously dense.  There is questioned architectural distortion projecting slightly below the nipple in the anterior right breast on the MLO view. There is questioned architectural distortion in the upper outer posterior left breast. These are best appreciated on Tomosynthesis. There are no suspicious masses or calcifications in either breast.   BI-RADS 0: Incomplete    01/24/2023, Bilateral Diagnostic MMG with Emmanuel & Bilateral Limited Breast US ( [facility])  MMG:  In the outer central posterior left breast there is persistent mild architectural distortion.   Previously questioned architectural distortion projecting slightly below the nipple in the anterior right breast partially effaces with spot compression and localizes to the outer breast on Tomosynthesis.  US:  LEFT:  At 2:00, 2 cm from the nipple, there is a 0.6 cm benign-appearing cyst. At 2:00, 6 cm from the nipple, there is a 2.1 x 1.0 x 1.9 cm oval hypoechoic mass corresponding to a mammographically stable focal asymmetry, which can be considered benign. This is in the region of the mammographic distortion, and there is slightly irregular tissue adjacent to the mass, however, a discrete correlate for the mammographic distortion is not clearly visualized.  RIGHT:  At 8:00, 3 cm from nipple, there is a 0.3 cm benign-appearing cyst. In the retro areolar right breast, there is a 0.9 x 0.5 x 1.1 cm benign-appearing minimally complicated cyst.  BI-RADS 4: Suspicious    02/03/2023, [facility]  1. Left Breast at 3:30 o’clock (not for calcifications), Stereotactic Core Biopsy:               A. Intraductal papilloma, 2.2 mm.               B. Associated hyalinized fibroadenomatoid hyperplasia with rare adenosis and usual duct hyperplasia.               C.  Rare minute micro calcification noted within benign breast tissue.               D. No atypical hyperplasia, carcinoma in-situ nor invasive carcinoma identified.  -Bowtie clip.      Review of Systems:  See interval history.      Medications:    Current Outpatient Medications:   •  aspirin (aspirin) 81 MG EC tablet, Take 1 tablet by mouth Daily., Disp: , Rfl:   •  atenolol (TENORMIN) 25 MG tablet, Take 0.5 tablets by mouth Daily., Disp: 90 tablet, Rfl: 3  •  citalopram (CeleXA) 10 MG tablet, Take 1 tablet by mouth Daily., Disp: 90 tablet, Rfl: 1  •  hydroCHLOROthiazide (HYDRODIURIL) 25 MG tablet, Take 1 tablet by mouth Daily., Disp: 90 tablet, Rfl: 1  •  levothyroxine (SYNTHROID, LEVOTHROID) 50 MCG tablet, Take 1 tablet by mouth Daily., Disp: 90 tablet, Rfl: 3  •  losartan (COZAAR) 25 MG tablet, TAKE 1 TABLET BY MOUTH DAILY, Disp: 90 tablet, Rfl: 1  •  rosuvastatin (CRESTOR) 10 MG tablet, TAKE 1 TABLET BY MOUTH EVERY DAY, Disp: 90 tablet, Rfl: 1      Allergies   Allergen Reactions   • Amoxicillin Rash       Family History   Problem Relation Age of Onset   • Diabetes Mother    • Hypertension Mother    • Arthritis Mother    • Heart attack Father          age 67   • Heart disease Father         Heart Attack   • Cancer Brother    • Colon polyps Daughter    • Colon polyps Daughter    • Malig Hyperthermia Neg Hx    • Colon cancer Neg Hx    • Crohn's disease Neg Hx    • Inflammatory bowel disease Neg Hx    • Ulcerative colitis Neg Hx        Objective   PHYSICAL EXAMINATION:   Vitals:    23 1302   BP: 108/64   Pulse: 52   Resp: 16   SpO2: 96%     ECOG 0 - Asymptomatic  General: NAD, well appearing  Psych: a&o x 3, normal mood and affect  Eyes: EOMI, no scleral icterus  ENMT: neck supple without masses or thyromegaly, mucus membranes moist  MSK: normal gait, normal ROM in bilateral shoulders  Lymph nodes: no cervical, supraclavicular or axillary lymphadenopathy  Breast: symmetric, moderate size, grade 3  ptosis  Right: No visible abnormalities on inspection while seated, with arms raised or hands on hips. No masses, skin changes, or nipple abnormalities.  Left: No visible abnormalities on inspection while seated, with arms raised or hands on hips. No masses, skin changes, or nipple abnormalities.    Left breast, in-office ultrasound: At 2:00, 6 cm FN, there are postbiopsy changes located about 1 cm deep to the skin.       I have independently reviewed her imaging and here are my findings:   In the left upper outer breast, there initially was an 8 mm architectural distortion without an ultrasound correlate.  She has extremely dense tissue bilaterally and multiple cysts.      Assessment & Plan   Assessment:  82 y.o. F with a new diagnosis of a left breast intraductal papilloma associated with architectural distortion on mammogram.    Discussion:  We discussed the diagnosis of intraductal papilloma. I explained that sometimes when a papilloma is found on core needle biopsy, we recommend a larger surgical excision of the area if there is a possible risk of upgrade (risk of identifying atypical or cancer cells in the vicinity of the lesion). In her case, I am not too concerned about the 2 mm intraductal papilloma, however I am concerned about the 7-8 mm of architectural distortion on mammogram. I am not sure that the intraductal papilloma completely explains this distortion and we discussed the concept of concordance. I think the safest option would be a surgical excisional biopsy. She was really hoping to avoid surgery and she wants to think about it over the weekend.    We went ahead and discussed what surgery would entail in case she decides that she wants to proceed. I explained that excisional biopsy would require preoperative wire-localization. We discussed that should the final pathology be upgraded, she would likely require an additional operation. We reviewed additional risks and potential complications  associated with surgery, including, but not limited to, bleeding, infection, deformity or poor cosmetic result, chronic pain, numbness, seroma, hematoma, altered nipple sensation, deep venous thrombosis, and skin flap necrosis. We reviewed postoperative wound care and activity restrictions.     Plan:  -She will call us on Monday to let us know if she wants to go forward with surgery, which would be a left breast needle-localized excisional biopsy.  If she does not want surgery, then she will follow-up in 7/2023 with a left mammogram with Jama Vanegas.    Ariela Zaragoza MD    I spent 60 minutes caring for Kristie on this date of service. This time includes time spent by me in the following activities: preparing for the visit, performing a medically appropriate examination and/or evaluation , counseling and educating the patient/family/caregiver, documenting information in the medical record and independently interpreting results and communicating that information with the patient/family/caregiver.      CC:  Mariia Trujillo MD

## 2023-02-27 ENCOUNTER — TELEPHONE (OUTPATIENT)
Dept: SURGERY | Facility: CLINIC | Age: 83
End: 2023-02-27
Payer: MEDICARE

## 2023-03-01 ENCOUNTER — PREP FOR SURGERY (OUTPATIENT)
Dept: OTHER | Facility: HOSPITAL | Age: 83
End: 2023-03-01

## 2023-03-01 DIAGNOSIS — N64.59 OTHER SIGNS AND SYMPTOMS IN BREAST: ICD-10-CM

## 2023-03-01 DIAGNOSIS — D24.2 INTRADUCTAL PAPILLOMA OF LEFT BREAST: Primary | ICD-10-CM

## 2023-03-01 RX ORDER — CEFAZOLIN SODIUM 2 G/100ML
2 INJECTION, SOLUTION INTRAVENOUS ONCE
Status: CANCELLED | OUTPATIENT
Start: 2023-04-11 | End: 2023-03-01

## 2023-03-20 ENCOUNTER — TRANSCRIBE ORDERS (OUTPATIENT)
Dept: SURGERY | Facility: CLINIC | Age: 83
End: 2023-03-20
Payer: MEDICARE

## 2023-03-20 DIAGNOSIS — D24.2 INTRADUCTAL PAPILLOMA OF LEFT BREAST: Primary | ICD-10-CM

## 2023-03-27 RX ORDER — HYDROCHLOROTHIAZIDE 12.5 MG/1
12.5 TABLET ORAL DAILY
Qty: 90 TABLET | Refills: 1 | OUTPATIENT
Start: 2023-03-27

## 2023-03-27 RX ORDER — HYDROCHLOROTHIAZIDE 25 MG/1
25 TABLET ORAL DAILY
Qty: 90 TABLET | Refills: 1 | Status: SHIPPED | OUTPATIENT
Start: 2023-03-27 | End: 2023-03-27 | Stop reason: SDUPTHER

## 2023-03-27 RX ORDER — HYDROCHLOROTHIAZIDE 25 MG/1
25 TABLET ORAL DAILY
Qty: 90 TABLET | Refills: 1 | Status: SHIPPED | OUTPATIENT
Start: 2023-03-27

## 2023-04-04 ENCOUNTER — PRE-ADMISSION TESTING (OUTPATIENT)
Dept: PREADMISSION TESTING | Facility: HOSPITAL | Age: 83
End: 2023-04-04
Payer: MEDICARE

## 2023-04-04 VITALS
OXYGEN SATURATION: 97 % | RESPIRATION RATE: 20 BRPM | TEMPERATURE: 97 F | HEIGHT: 61 IN | SYSTOLIC BLOOD PRESSURE: 119 MMHG | BODY MASS INDEX: 26.81 KG/M2 | WEIGHT: 142 LBS | DIASTOLIC BLOOD PRESSURE: 78 MMHG | HEART RATE: 50 BPM

## 2023-04-04 DIAGNOSIS — D24.2 INTRADUCTAL PAPILLOMA OF LEFT BREAST: ICD-10-CM

## 2023-04-04 LAB
ANION GAP SERPL CALCULATED.3IONS-SCNC: 11 MMOL/L (ref 5–15)
BASOPHILS # BLD AUTO: 0.04 10*3/MM3 (ref 0–0.2)
BASOPHILS NFR BLD AUTO: 0.9 % (ref 0–1.5)
BUN SERPL-MCNC: 20 MG/DL (ref 8–23)
BUN/CREAT SERPL: 24.1 (ref 7–25)
CALCIUM SPEC-SCNC: 9.6 MG/DL (ref 8.6–10.5)
CHLORIDE SERPL-SCNC: 102 MMOL/L (ref 98–107)
CO2 SERPL-SCNC: 27 MMOL/L (ref 22–29)
CREAT SERPL-MCNC: 0.83 MG/DL (ref 0.57–1)
DEPRECATED RDW RBC AUTO: 42.2 FL (ref 37–54)
EGFRCR SERPLBLD CKD-EPI 2021: 70.5 ML/MIN/1.73
EOSINOPHIL # BLD AUTO: 0.19 10*3/MM3 (ref 0–0.4)
EOSINOPHIL NFR BLD AUTO: 4.1 % (ref 0.3–6.2)
ERYTHROCYTE [DISTWIDTH] IN BLOOD BY AUTOMATED COUNT: 12.3 % (ref 12.3–15.4)
GLUCOSE SERPL-MCNC: 87 MG/DL (ref 65–99)
HCT VFR BLD AUTO: 37.1 % (ref 34–46.6)
HGB BLD-MCNC: 12.1 G/DL (ref 12–15.9)
IMM GRANULOCYTES # BLD AUTO: 0.01 10*3/MM3 (ref 0–0.05)
IMM GRANULOCYTES NFR BLD AUTO: 0.2 % (ref 0–0.5)
LYMPHOCYTES # BLD AUTO: 1.77 10*3/MM3 (ref 0.7–3.1)
LYMPHOCYTES NFR BLD AUTO: 38.1 % (ref 19.6–45.3)
MCH RBC QN AUTO: 30.6 PG (ref 26.6–33)
MCHC RBC AUTO-ENTMCNC: 32.6 G/DL (ref 31.5–35.7)
MCV RBC AUTO: 93.7 FL (ref 79–97)
MONOCYTES # BLD AUTO: 0.49 10*3/MM3 (ref 0.1–0.9)
MONOCYTES NFR BLD AUTO: 10.5 % (ref 5–12)
NEUTROPHILS NFR BLD AUTO: 2.15 10*3/MM3 (ref 1.7–7)
NEUTROPHILS NFR BLD AUTO: 46.2 % (ref 42.7–76)
NRBC BLD AUTO-RTO: 0 /100 WBC (ref 0–0.2)
PLATELET # BLD AUTO: 188 10*3/MM3 (ref 140–450)
PMV BLD AUTO: 11.5 FL (ref 6–12)
POTASSIUM SERPL-SCNC: 4.1 MMOL/L (ref 3.5–5.2)
QT INTERVAL: 482 MS
RBC # BLD AUTO: 3.96 10*6/MM3 (ref 3.77–5.28)
SODIUM SERPL-SCNC: 140 MMOL/L (ref 136–145)
WBC NRBC COR # BLD: 4.65 10*3/MM3 (ref 3.4–10.8)

## 2023-04-04 PROCEDURE — 80048 BASIC METABOLIC PNL TOTAL CA: CPT

## 2023-04-04 PROCEDURE — 85025 COMPLETE CBC W/AUTO DIFF WBC: CPT

## 2023-04-04 PROCEDURE — 36415 COLL VENOUS BLD VENIPUNCTURE: CPT

## 2023-04-04 PROCEDURE — 93010 ELECTROCARDIOGRAM REPORT: CPT | Performed by: STUDENT IN AN ORGANIZED HEALTH CARE EDUCATION/TRAINING PROGRAM

## 2023-04-04 PROCEDURE — 93005 ELECTROCARDIOGRAM TRACING: CPT

## 2023-04-04 RX ORDER — ACETAMINOPHEN 500 MG
500 TABLET ORAL EVERY 6 HOURS PRN
COMMUNITY

## 2023-04-04 NOTE — DISCHARGE INSTRUCTIONS
Take the following medications the morning of surgery:    levothyroxine    If you are on prescription narcotic pain medication to control your pain you may also take that medication the morning of surgery.    General Instructions:  Do not eat solid food after midnight the night before surgery.  You may drink clear liquids day of surgery but must stop at least one hour before your hospital arrival time.  It is beneficial for you to have a clear drink that contains carbohydrates the day of surgery.  We suggest a 12 to 20 ounce bottle of Gatorade or Powerade for non-diabetic patients or a 12 to 20 ounce bottle of G2 or Powerade Zero for diabetic patients. (Pediatric patients, are not advised to drink a 12 to 20 ounce carbohydrate drink)    Clear liquids are liquids you can see through.  Nothing red in color.     Plain water                               Sports drinks  Sodas                                   Gelatin (Jell-O)  Fruit juices without pulp such as white grape juice and apple juice  Popsicles that contain no fruit or yogurt  Tea or coffee (no cream or milk added)  Gatorade / Powerade  G2 / Powerade Zero    Infants may have breast milk up to four hours before surgery.  Infants drinking formula may drink formula up to six hours before surgery.   Patients who avoid smoking, chewing tobacco and alcohol for 4 weeks prior to surgery have a reduced risk of post-operative complications.  Quit smoking as many days before surgery as you can.  Do not smoke, use chewing tobacco or drink alcohol the day of surgery.   If applicable bring your C-PAP/ BI-PAP machine.  Bring any papers given to you in the doctor’s office.  Wear clean comfortable clothes.  Do not wear contact lenses, false eyelashes or make-up.  Bring a case for your glasses.   Bring crutches or walker if applicable.  Remove all piercings.  Leave jewelry and any other valuables at home.  Hair extensions with metal clips must be removed prior to surgery.  The  Pre-Admission Testing nurse will instruct you to bring medications if unable to obtain an accurate list in Pre-Admission Testing.        If you were given a blood bank ID arm band remember to bring it with you the day of surgery.    Preventing a Surgical Site Infection:  For 2 to 3 days before surgery, avoid shaving with a razor because the razor can irritate skin and make it easier to develop an infection.    Any areas of open skin can increase the risk of a post-operative wound infection by allowing bacteria to enter and travel throughout the body.  Notify your surgeon if you have any skin wounds / rashes even if it is not near the expected surgical site.  The area will need assessed to determine if surgery should be delayed until it is healed.  The night prior to surgery shower using a fresh bar of anti-bacterial soap (such as Dial) and clean washcloth.  Sleep in a clean bed with clean clothing.  Do not allow pets to sleep with you.  Shower on the morning of surgery using a fresh bar of anti-bacterial soap (such as Dial) and clean washcloth.  Dry with a clean towel and dress in clean clothing.  Ask your surgeon if you will be receiving antibiotics prior to surgery.  Make sure you, your family, and all healthcare providers clean their hands with soap and water or an alcohol based hand  before caring for you or your wound.    Day of surgery:4/11/2023   0530  Your arrival time is approximately two hours before your scheduled surgery time.  Upon arrival, a Pre-op nurse and Anesthesiologist will review your health history, obtain vital signs, and answer questions you may have.  The only belongings needed at this time will be a list of your home medications and if applicable your C-PAP/BI-PAP machine.  A Pre-op nurse will start an IV and you may receive medication in preparation for surgery, including something to help you relax.     Please be aware that surgery does come with discomfort.  We want to make  every effort to control your discomfort so please discuss any uncontrolled symptoms with your nurse.   Your doctor will most likely have prescribed pain medications.      If you are going home after surgery you will receive individualized written care instructions before being discharged.  A responsible adult must drive you to and from the hospital on the day of your surgery and stay with you for 24 hours.  Discharge prescriptions can be filled by the hospital pharmacy during regular pharmacy hours.  If you are having surgery late in the day/evening your prescription may be e-prescribed to your pharmacy.  Please verify your pharmacy hours or chose a 24 hour pharmacy to avoid not having access to your prescription because your pharmacy has closed for the day.    If you are staying overnight following surgery, you will be transported to your hospital room following the recovery period.  [facility] has all private rooms.    If you have any questions please call Pre-Admission Testing at [phone].  Deductibles and co-payments are collected on the day of service. Please be prepared to pay the required co-pay, deductible or deposit on the day of service as defined by your plan.    Call your surgeon immediately if you experience any of the following symptoms:  Sore Throat  Shortness of Breath or difficulty breathing  Cough  Chills  Body soreness or muscle pain  Headache  Fever  New loss of taste or smell  Do not arrive for your surgery ill.  Your procedure will need to be rescheduled to another time.  You will need to call your physician before the day of surgery to avoid any unnecessary exposure to hospital staff as well as other patients.

## 2023-04-05 ENCOUNTER — TELEPHONE (OUTPATIENT)
Dept: CARDIOLOGY | Facility: CLINIC | Age: 83
End: 2023-04-05
Payer: MEDICARE

## 2023-04-05 NOTE — TELEPHONE ENCOUNTER
Pt had pre-op labs and EKG for breast surgery 4/11. The EKG showed Bradycardia and they would like for you to review before they proceed ( EKG under media tab )...Lena

## 2023-04-11 ENCOUNTER — APPOINTMENT (OUTPATIENT)
Dept: GENERAL RADIOLOGY | Facility: HOSPITAL | Age: 83
End: 2023-04-11
Payer: MEDICARE

## 2023-04-11 ENCOUNTER — HOSPITAL ENCOUNTER (OUTPATIENT)
Dept: MAMMOGRAPHY | Facility: HOSPITAL | Age: 83
Discharge: HOME | End: 2023-04-11
Payer: MEDICARE

## 2023-04-11 ENCOUNTER — HOSPITAL ENCOUNTER (OUTPATIENT)
Facility: HOSPITAL | Age: 83
Setting detail: HOSPITAL OUTPATIENT SURGERY
Discharge: HOME | End: 2023-04-11
Attending: SURGERY | Admitting: SURGERY
Payer: MEDICARE

## 2023-04-11 ENCOUNTER — ANESTHESIA (OUTPATIENT)
Dept: PERIOP | Facility: HOSPITAL | Age: 83
End: 2023-04-11
Payer: MEDICARE

## 2023-04-11 ENCOUNTER — ANESTHESIA EVENT (OUTPATIENT)
Dept: PERIOP | Facility: HOSPITAL | Age: 83
End: 2023-04-11
Payer: MEDICARE

## 2023-04-11 VITALS
TEMPERATURE: 97.8 F | BODY MASS INDEX: 26.81 KG/M2 | OXYGEN SATURATION: 97 % | SYSTOLIC BLOOD PRESSURE: 116 MMHG | WEIGHT: 141.98 LBS | HEART RATE: 58 BPM | DIASTOLIC BLOOD PRESSURE: 70 MMHG | RESPIRATION RATE: 18 BRPM | HEIGHT: 61 IN

## 2023-04-11 DIAGNOSIS — D24.2 INTRADUCTAL PAPILLOMA OF LEFT BREAST: ICD-10-CM

## 2023-04-11 PROCEDURE — 25010000002 ONDANSETRON PER 1 MG: Performed by: NURSE ANESTHETIST, CERTIFIED REGISTERED

## 2023-04-11 PROCEDURE — 76098 X-RAY EXAM SURGICAL SPECIMEN: CPT

## 2023-04-11 PROCEDURE — 19125 EXCISION BREAST LESION: CPT | Performed by: SURGERY

## 2023-04-11 PROCEDURE — 0 LIDOCAINE 1 % SOLUTION: Performed by: SURGERY

## 2023-04-11 PROCEDURE — 25010000002 CEFAZOLIN IN DEXTROSE 2-4 GM/100ML-% SOLUTION: Performed by: SURGERY

## 2023-04-11 PROCEDURE — 88307 TISSUE EXAM BY PATHOLOGIST: CPT | Performed by: SURGERY

## 2023-04-11 PROCEDURE — 25010000002 HYDROMORPHONE 1 MG/ML SOLUTION: Performed by: NURSE ANESTHETIST, CERTIFIED REGISTERED

## 2023-04-11 PROCEDURE — 25010000002 PROPOFOL 10 MG/ML EMULSION: Performed by: NURSE ANESTHETIST, CERTIFIED REGISTERED

## 2023-04-11 PROCEDURE — C1819 TISSUE LOCALIZATION-EXCISION: HCPCS

## 2023-04-11 PROCEDURE — 25010000002 DEXAMETHASONE SODIUM PHOSPHATE 20 MG/5ML SOLUTION: Performed by: NURSE ANESTHETIST, CERTIFIED REGISTERED

## 2023-04-11 PROCEDURE — S0260 H&P FOR SURGERY: HCPCS | Performed by: SURGERY

## 2023-04-11 RX ORDER — EPHEDRINE SULFATE 50 MG/ML
5 INJECTION, SOLUTION INTRAVENOUS ONCE AS NEEDED
Status: DISCONTINUED | OUTPATIENT
Start: 2023-04-11 | End: 2023-04-11 | Stop reason: HOSPADM

## 2023-04-11 RX ORDER — SODIUM CHLORIDE, SODIUM LACTATE, POTASSIUM CHLORIDE, CALCIUM CHLORIDE 600; 310; 30; 20 MG/100ML; MG/100ML; MG/100ML; MG/100ML
9 INJECTION, SOLUTION INTRAVENOUS CONTINUOUS
Status: DISCONTINUED | OUTPATIENT
Start: 2023-04-11 | End: 2023-04-11 | Stop reason: HOSPADM

## 2023-04-11 RX ORDER — LIDOCAINE HYDROCHLORIDE 20 MG/ML
INJECTION, SOLUTION INFILTRATION; PERINEURAL AS NEEDED
Status: DISCONTINUED | OUTPATIENT
Start: 2023-04-11 | End: 2023-04-11 | Stop reason: SURG

## 2023-04-11 RX ORDER — BUPIVACAINE HYDROCHLORIDE AND EPINEPHRINE 2.5; 5 MG/ML; UG/ML
INJECTION, SOLUTION EPIDURAL; INFILTRATION; INTRACAUDAL; PERINEURAL AS NEEDED
Status: DISCONTINUED | OUTPATIENT
Start: 2023-04-11 | End: 2023-04-11 | Stop reason: HOSPADM

## 2023-04-11 RX ORDER — LIDOCAINE HYDROCHLORIDE 10 MG/ML
3 INJECTION, SOLUTION INFILTRATION; PERINEURAL ONCE
Status: COMPLETED | OUTPATIENT
Start: 2023-04-11 | End: 2023-04-11

## 2023-04-11 RX ORDER — EPHEDRINE SULFATE 50 MG/ML
INJECTION INTRAVENOUS AS NEEDED
Status: DISCONTINUED | OUTPATIENT
Start: 2023-04-11 | End: 2023-04-11 | Stop reason: SURG

## 2023-04-11 RX ORDER — CEFAZOLIN SODIUM 2 G/100ML
2 INJECTION, SOLUTION INTRAVENOUS ONCE
Status: COMPLETED | OUTPATIENT
Start: 2023-04-11 | End: 2023-04-11

## 2023-04-11 RX ORDER — MAGNESIUM HYDROXIDE 1200 MG/15ML
LIQUID ORAL AS NEEDED
Status: DISCONTINUED | OUTPATIENT
Start: 2023-04-11 | End: 2023-04-11 | Stop reason: HOSPADM

## 2023-04-11 RX ORDER — LIDOCAINE HYDROCHLORIDE 10 MG/ML
0.5 INJECTION, SOLUTION EPIDURAL; INFILTRATION; INTRACAUDAL; PERINEURAL ONCE AS NEEDED
Status: DISCONTINUED | OUTPATIENT
Start: 2023-04-11 | End: 2023-04-11 | Stop reason: HOSPADM

## 2023-04-11 RX ORDER — TRAMADOL HYDROCHLORIDE 50 MG/1
50 TABLET ORAL EVERY 8 HOURS PRN
Qty: 3 TABLET | Refills: 0 | Status: SHIPPED | OUTPATIENT
Start: 2023-04-11

## 2023-04-11 RX ORDER — SODIUM CHLORIDE 0.9 % (FLUSH) 0.9 %
3 SYRINGE (ML) INJECTION EVERY 12 HOURS SCHEDULED
Status: DISCONTINUED | OUTPATIENT
Start: 2023-04-11 | End: 2023-04-11 | Stop reason: HOSPADM

## 2023-04-11 RX ORDER — HYDROCODONE BITARTRATE AND ACETAMINOPHEN 7.5; 325 MG/1; MG/1
1 TABLET ORAL EVERY 4 HOURS PRN
Status: DISCONTINUED | OUTPATIENT
Start: 2023-04-11 | End: 2023-04-11 | Stop reason: HOSPADM

## 2023-04-11 RX ORDER — HYDRALAZINE HYDROCHLORIDE 20 MG/ML
5 INJECTION INTRAMUSCULAR; INTRAVENOUS
Status: DISCONTINUED | OUTPATIENT
Start: 2023-04-11 | End: 2023-04-11 | Stop reason: HOSPADM

## 2023-04-11 RX ORDER — MIDAZOLAM HYDROCHLORIDE 1 MG/ML
0.5 INJECTION INTRAMUSCULAR; INTRAVENOUS
Status: DISCONTINUED | OUTPATIENT
Start: 2023-04-11 | End: 2023-04-11 | Stop reason: HOSPADM

## 2023-04-11 RX ORDER — FLUMAZENIL 0.1 MG/ML
0.2 INJECTION INTRAVENOUS AS NEEDED
Status: DISCONTINUED | OUTPATIENT
Start: 2023-04-11 | End: 2023-04-11 | Stop reason: HOSPADM

## 2023-04-11 RX ORDER — HYDROMORPHONE HYDROCHLORIDE 1 MG/ML
0.25 INJECTION, SOLUTION INTRAMUSCULAR; INTRAVENOUS; SUBCUTANEOUS
Status: DISCONTINUED | OUTPATIENT
Start: 2023-04-11 | End: 2023-04-11 | Stop reason: HOSPADM

## 2023-04-11 RX ORDER — DIPHENHYDRAMINE HYDROCHLORIDE 50 MG/ML
12.5 INJECTION INTRAMUSCULAR; INTRAVENOUS
Status: DISCONTINUED | OUTPATIENT
Start: 2023-04-11 | End: 2023-04-11 | Stop reason: HOSPADM

## 2023-04-11 RX ORDER — FENTANYL CITRATE 50 UG/ML
50 INJECTION, SOLUTION INTRAMUSCULAR; INTRAVENOUS
Status: DISCONTINUED | OUTPATIENT
Start: 2023-04-11 | End: 2023-04-11 | Stop reason: HOSPADM

## 2023-04-11 RX ORDER — DROPERIDOL 2.5 MG/ML
0.62 INJECTION, SOLUTION INTRAMUSCULAR; INTRAVENOUS
Status: DISCONTINUED | OUTPATIENT
Start: 2023-04-11 | End: 2023-04-11 | Stop reason: HOSPADM

## 2023-04-11 RX ORDER — IPRATROPIUM BROMIDE AND ALBUTEROL SULFATE 2.5; .5 MG/3ML; MG/3ML
3 SOLUTION RESPIRATORY (INHALATION) ONCE AS NEEDED
Status: DISCONTINUED | OUTPATIENT
Start: 2023-04-11 | End: 2023-04-11 | Stop reason: HOSPADM

## 2023-04-11 RX ORDER — DEXAMETHASONE SODIUM PHOSPHATE 4 MG/ML
INJECTION, SOLUTION INTRA-ARTICULAR; INTRALESIONAL; INTRAMUSCULAR; INTRAVENOUS; SOFT TISSUE AS NEEDED
Status: DISCONTINUED | OUTPATIENT
Start: 2023-04-11 | End: 2023-04-11 | Stop reason: SURG

## 2023-04-11 RX ORDER — PROPOFOL 10 MG/ML
VIAL (ML) INTRAVENOUS AS NEEDED
Status: DISCONTINUED | OUTPATIENT
Start: 2023-04-11 | End: 2023-04-11 | Stop reason: SURG

## 2023-04-11 RX ORDER — ONDANSETRON 2 MG/ML
4 INJECTION INTRAMUSCULAR; INTRAVENOUS ONCE AS NEEDED
Status: DISCONTINUED | OUTPATIENT
Start: 2023-04-11 | End: 2023-04-11 | Stop reason: HOSPADM

## 2023-04-11 RX ORDER — NALOXONE HCL 0.4 MG/ML
0.2 VIAL (ML) INJECTION AS NEEDED
Status: DISCONTINUED | OUTPATIENT
Start: 2023-04-11 | End: 2023-04-11 | Stop reason: HOSPADM

## 2023-04-11 RX ORDER — PROMETHAZINE HYDROCHLORIDE 25 MG/1
25 SUPPOSITORY RECTAL ONCE AS NEEDED
Status: DISCONTINUED | OUTPATIENT
Start: 2023-04-11 | End: 2023-04-11 | Stop reason: HOSPADM

## 2023-04-11 RX ORDER — HYDROCODONE BITARTRATE AND ACETAMINOPHEN 5; 325 MG/1; MG/1
1 TABLET ORAL ONCE AS NEEDED
Status: DISCONTINUED | OUTPATIENT
Start: 2023-04-11 | End: 2023-04-11 | Stop reason: HOSPADM

## 2023-04-11 RX ORDER — FAMOTIDINE 10 MG/ML
20 INJECTION, SOLUTION INTRAVENOUS ONCE
Status: COMPLETED | OUTPATIENT
Start: 2023-04-11 | End: 2023-04-11

## 2023-04-11 RX ORDER — LABETALOL HYDROCHLORIDE 5 MG/ML
5 INJECTION, SOLUTION INTRAVENOUS
Status: DISCONTINUED | OUTPATIENT
Start: 2023-04-11 | End: 2023-04-11 | Stop reason: HOSPADM

## 2023-04-11 RX ORDER — PROMETHAZINE HYDROCHLORIDE 25 MG/1
25 TABLET ORAL ONCE AS NEEDED
Status: DISCONTINUED | OUTPATIENT
Start: 2023-04-11 | End: 2023-04-11 | Stop reason: HOSPADM

## 2023-04-11 RX ORDER — ACETAMINOPHEN 500 MG
1000 TABLET ORAL EVERY 8 HOURS
Qty: 30 TABLET | Refills: 0 | Status: SHIPPED | OUTPATIENT
Start: 2023-04-11 | End: 2023-04-16

## 2023-04-11 RX ORDER — ONDANSETRON 2 MG/ML
INJECTION INTRAMUSCULAR; INTRAVENOUS AS NEEDED
Status: DISCONTINUED | OUTPATIENT
Start: 2023-04-11 | End: 2023-04-11 | Stop reason: SURG

## 2023-04-11 RX ORDER — SODIUM CHLORIDE 0.9 % (FLUSH) 0.9 %
3-10 SYRINGE (ML) INJECTION AS NEEDED
Status: DISCONTINUED | OUTPATIENT
Start: 2023-04-11 | End: 2023-04-11 | Stop reason: HOSPADM

## 2023-04-11 RX ADMIN — Medication 3 ML: at 07:56

## 2023-04-11 RX ADMIN — EPHEDRINE SULFATE 10 MG: 50 INJECTION INTRAVENOUS at 09:45

## 2023-04-11 RX ADMIN — Medication 3 ML: at 07:55

## 2023-04-11 RX ADMIN — EPHEDRINE SULFATE 10 MG: 50 INJECTION INTRAVENOUS at 09:47

## 2023-04-11 RX ADMIN — DEXAMETHASONE SODIUM PHOSPHATE 6 MG: 4 INJECTION, SOLUTION INTRAMUSCULAR; INTRAVENOUS at 09:42

## 2023-04-11 RX ADMIN — PROPOFOL 150 MG: 10 INJECTION, EMULSION INTRAVENOUS at 09:35

## 2023-04-11 RX ADMIN — SODIUM CHLORIDE, POTASSIUM CHLORIDE, SODIUM LACTATE AND CALCIUM CHLORIDE 9 ML/HR: 600; 310; 30; 20 INJECTION, SOLUTION INTRAVENOUS at 06:40

## 2023-04-11 RX ADMIN — HYDROMORPHONE HYDROCHLORIDE 0.25 MG: 1 INJECTION, SOLUTION INTRAMUSCULAR; INTRAVENOUS; SUBCUTANEOUS at 09:38

## 2023-04-11 RX ADMIN — HYDROMORPHONE HYDROCHLORIDE 0.25 MG: 1 INJECTION, SOLUTION INTRAMUSCULAR; INTRAVENOUS; SUBCUTANEOUS at 09:35

## 2023-04-11 RX ADMIN — PROPOFOL 50 MG: 10 INJECTION, EMULSION INTRAVENOUS at 09:40

## 2023-04-11 RX ADMIN — LIDOCAINE HYDROCHLORIDE 80 MG: 20 INJECTION, SOLUTION INFILTRATION; PERINEURAL at 09:35

## 2023-04-11 RX ADMIN — FAMOTIDINE 20 MG: 10 INJECTION, SOLUTION INTRAVENOUS at 06:40

## 2023-04-11 RX ADMIN — ONDANSETRON 4 MG: 2 INJECTION INTRAMUSCULAR; INTRAVENOUS at 09:47

## 2023-04-11 RX ADMIN — CEFAZOLIN SODIUM 2 G: 2 INJECTION, SOLUTION INTRAVENOUS at 09:20

## 2023-04-11 NOTE — ANESTHESIA POSTPROCEDURE EVALUATION
"Patient: Kristie Franz    Procedure Summary     Date: 04/11/23 Room / Location: [facility] OR [address] [place] [address] MAIN OR    Anesthesia Start: 0926 Anesthesia Stop: 1031    Procedure: Left breast needle-localized excisional biopsy (Left: Breast) Diagnosis:       Intraductal papilloma of left breast      (Intraductal papilloma of left breast [D24.2])    Surgeons: Ariela Zaragoza MD Provider: Artemio Schwartz MD    Anesthesia Type: general ASA Status: 3          Anesthesia Type: general    Vitals  Vitals Value Taken Time   /64 04/11/23 1201   Temp 36.6 °C (97.8 °F) 04/11/23 1029   Pulse 58 04/11/23 1207   Resp 15 04/11/23 1200   SpO2 96 % 04/11/23 1207   Vitals shown include unvalidated device data.        Post Anesthesia Care and Evaluation    Patient location during evaluation: PACU  Patient participation: complete - patient participated  Level of consciousness: awake and alert  Pain management: adequate    Airway patency: patent  Anesthetic complications: No anesthetic complications  PONV Status: controlled  Cardiovascular status: acceptable and hemodynamically stable  Respiratory status: acceptable  Hydration status: acceptable    Comments: /63   Pulse 59   Temp 36.6 °C (97.8 °F) (Oral)   Resp 18   Ht 154.9 cm (61\")   Wt 64.4 kg (141 lb 15.6 oz)   LMP  (LMP Unknown)   SpO2 95%   BMI 26.83 kg/m²       "

## 2023-04-11 NOTE — H&P
BREAST SURGERY HISTORY & PHYSICAL        Chief complaint: Left breast intraductal papilloma         Subjective      HPI:   1/27/2023, Visit with FRED Gómez:  Ms. Kristie Franz is a 83 yo woman, seen at the request of Mariia Trujillo MD, for abnormal breast imaging  She has a personal history of stroke, hypertension, hyperlipidemia, hypothyroidism, atrial tachycardia, right breast cyst aspiration unknown year  She denies any family history of breast or ovarian cancer.   Today she presents with concerns regarding recent mammogram results and need of biopsy  She denies any breast lumps, pain, skin changes, or nipple discharge.     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  She had a bruise and some pain in her left breast after the biopsy, but this is improving.     4/11/2023, Interval History:  She presents today for surgery.  She denies any new complaints.        Breast history/imaging (updated 2/23/2023):     11/09/2020, Screening MMG ([facility])  The breast are extremely dense.   1. There is a focal stable asymmetry seen in the posterior one-third upper outer region of the left breast. The parenchyma includes multiple other stable nodular asymmetries. No suspicious masses, suspicious micro calcifications or areas of architectural distortion are identified.  2. The parenchyma of the right breasts also includes stable nodular asymmetries. No suspicious masses, suspicious micro calcifications or areas of architectural distortion are identified.  BI-RADS 2: Benign      11/23/2021, Screening MMG ([facility])  The breast are extremely dense.   There is a focal asymmetry seen in the upper outer region of the left breast. There are no significant changes from the prior exam(s). The parenchyma includes stable nodular asymmetries. No suspicious masses, suspicious micro calcifications or areas of architectural distortion are  identified.  In the right breast, no suspicious masses, significant calcifications or other abnormalities are seen.  BI-RADS 2: Benign     11/28/2022, Screening MMG with [facility] ( [facility])  The breasts are heterogeneously dense.  There is questioned architectural distortion projecting slightly below the nipple in the anterior right breast on the MLO view. There is questioned architectural distortion in the upper outer posterior left breast. These are best appreciated on Tomosynthesis. There are no suspicious masses or calcifications in either breast.   BI-RADS 0: Incomplete     01/24/2023, Bilateral Diagnostic MMG with Emmanuel & Bilateral Limited Breast US ( [facility])  MMG:  In the outer central posterior left breast there is persistent mild architectural distortion.   Previously questioned architectural distortion projecting slightly below the nipple in the anterior right breast partially effaces with spot compression and localizes to the outer breast on Tomosynthesis.  US:  LEFT:  At 2:00, 2 cm from the nipple, there is a 0.6 cm benign-appearing cyst. At 2:00, 6 cm from the nipple, there is a 2.1 x 1.0 x 1.9 cm oval hypoechoic mass corresponding to a mammographically stable focal asymmetry, which can be considered benign. This is in the region of the mammographic distortion, and there is slightly irregular tissue adjacent to the mass, however, a discrete correlate for the mammographic distortion is not clearly visualized.  RIGHT:  At 8:00, 3 cm from nipple, there is a 0.3 cm benign-appearing cyst. In the retro areolar right breast, there is a 0.9 x 0.5 x 1.1 cm benign-appearing minimally complicated cyst.  BI-RADS 4: Suspicious     02[facility]03/2023[facility]  1. Left Breast at 3:30 o’clock (not for calcifications), Stereotactic Core Biopsy:               A. Intraductal papilloma, 2.2 mm.               B. Associated hyalinized fibroadenomatoid hyperplasia with rare adenosis and usual duct  "hyperplasia.               C. Rare minute micro calcification noted within benign breast tissue.               D. No atypical hyperplasia, carcinoma in-situ nor invasive carcinoma identified.  -Bowtie clip.        Review of Systems:  See interval history.        Medications:   See chart.             Allergies   Allergen Reactions   • Amoxicillin Rash               Family History   Problem Relation Age of Onset   • Diabetes Mother     • Hypertension Mother     • Arthritis Mother     • Heart attack Father            age 67   • Heart disease Father           Heart Attack   • Cancer Brother     • Colon polyps Daughter     • Colon polyps Daughter     • Malig Hyperthermia Neg Hx     • Colon cancer Neg Hx     • Crohn's disease Neg Hx     • Inflammatory bowel disease Neg Hx     • Ulcerative colitis Neg Hx           Objective      PHYSICAL EXAMINATION:   /86 (BP Location: Right arm, Patient Position: Lying)   Pulse (!) 44   Temp 97.5 °F (36.4 °C) (Oral)   Resp 18   Ht 154.9 cm (61\")   Wt 64.4 kg (141 lb 15.6 oz)   LMP  (LMP Unknown)   SpO2 98%   BMI 26.83 kg/m²   ECOG 0 - Asymptomatic  General: NAD, well appearing  Psych: a&o x 3, normal mood and affect  Eyes: EOMI, no scleral icterus  ENMT: neck supple without masses or thyromegaly, mucus membranes moist  MSK: normal gait, normal ROM in bilateral shoulders  Lymph nodes: no cervical, supraclavicular or axillary lymphadenopathy  Breast: symmetric, moderate size, grade 3 ptosis  Right: No visible abnormalities on inspection while seated, with arms raised or hands on hips. No masses, skin changes, or nipple abnormalities.  Left: No visible abnormalities on inspection while seated, with arms raised or hands on hips. No masses, skin changes, or nipple abnormalities.        I have independently reviewed her imaging and here are my findings:   In the left upper outer breast, there initially was an 8 mm architectural distortion without an ultrasound correlate.  She " has extremely dense tissue bilaterally and multiple cysts.        Assessment & Plan     Assessment:  82 y.o. F with a new diagnosis of a left breast intraductal papilloma associated with architectural distortion on mammogram.     Plan:  -Left breast needle-localized excisional biopsy.      Ariela Zaragoza MD    Copied text in this note has been reviewed and is accurate as of 04/11/23.

## 2023-04-11 NOTE — OP NOTE
Operative Report    Patient Name:  Kristie Franz  YOB: 1940    Date of Surgery:  4/11/2023    Pre-op Diagnosis:   Intraductal papilloma of left breast [D24.2]       Post-Op Diagnosis:  Intraductal papilloma of left breast [D24.2]    Procedure:  Left breast needle-localized excisional biopsy      Staff:  Surgeon(s):  Ariela Zaragoza MD       Anesthesia: General    Estimated Blood Loss: 0 mL    Implants:    Nothing was implanted during the procedure    Specimen:          Specimens     ID Source Type Tests Collected By Collected At Frozen?    A Breast, Left Tissue · TISSUE PATHOLOGY EXAM   Ariela Zaragoza MD 4/11/23 1003 No    Description: Left breast excisional biopsy - short stitch superior and long stitch lateral    Comment: Fresh for permanent            Findings: Wire and clip in good position in specimen radiograph.    Complications: None     Indications: The patient is a 82 y.o. F with a new diagnosis of a left breast intraductal papilloma associated with architectural distortion on mammogram. She presents today for excisional biopsy.  This required preoperative wire-localization. The risks and benefits of surgery were discussed preoperatively and she understood and agreed to proceed.      Description of Procedure:  Preoperatively, the patient was taken to the radiology department and the lesion was localized with a needle/wire. I reviewed the post-localization mammogram to determine the trajectory of the wire. The patient was identified in the preoperative area and brought to the operating room and placed supine on the table. Patient verification was performed and general anesthesia was induced. The patient was prepped and draped in sterile fashion with the wire/needle prepped into the field. A time out was performed and all were in agreement. I confirmed that the patient had received preoperative antibiotics.      On the skin, I marked the suggested location of the lesion based on  the mammographic localization imaging. The skin was infiltrated with local anesthesia. A lower outer radial incision was made with a scalpel and carried down through the dermis with sharp dissection. This included a thin ellipse of skin surrounding the needle. Flaps were raised according to the planned dissection. An anterior flap was raised first. Dissection was then carried out superiorly, inferiorly, medially, and laterally. The posterior dissection was completed and the specimen removed. The specimen was oriented with a short stitch superiorly and a long stitch laterally. Specimen radiograph showed the wire and clip in good position.    The breast cavity was irrigated and hemostasis achieved. The remaining local anesthesia was infiltrated into the breast cavity. The breast parenchyma was brought together with 3-0 vicryl stitches. The deep dermis was closed with interrupted 3-0 vicryl stitches. The skin was closed with 4-0 subcuticular running monocryl and covered with skin glue. Counts were correct at the end of the case. The patient was awakened from anesthesia and taken to the PACU in stable condition.    Ariela Zaragoza MD     Date: 4/11/2023  Time: 10:38 EDT

## 2023-04-11 NOTE — ANESTHESIA PREPROCEDURE EVALUATION
Anesthesia Evaluation     Patient summary reviewed and Nursing notes reviewed   no history of anesthetic complications:  NPO Solid Status: > 8 hours  NPO Liquid Status: > 4 hours           Airway   Mallampati: II  TM distance: >3 FB  Neck ROM: full  Dental - normal exam     Pulmonary - normal exam   Cardiovascular - normal exam  Exercise tolerance: good (4-7 METS)    ECG reviewed    (+) hypertension, hyperlipidemia,     ROS comment: TTE:  ? Calculated left ventricular EF = 59.6% Estimated left ventricular EF was in agreement with the calculated left ventricular EF. Left ventricular systolic function is normal. Normal left ventricular cavity size noted. Left ventricular wall thickness is consistent with mild concentric hypertrophy. All left ventricular wall segments contract normally. Left ventricular diastolic function is consistent with (grade I) impaired relaxation.  ? Normal right ventricular cavity size and systolic function noted.  ? The left atrial cavity is mildly dilated.  ? Estimated right ventricular systolic pressure from tricuspid regurgitation is normal (<35 mmHg).      Neuro/Psych  (+) headaches, psychiatric history Anxiety and Depression,    GI/Hepatic/Renal/Endo    (+)   thyroid problem hypothyroidism    Musculoskeletal     (+) back pain,   Abdominal  - normal exam   Substance History      OB/GYN          Other   arthritis,    history of cancer                      Anesthesia Plan    ASA 3     general     (I have reviewed the patient's history with the patient and the chart, including all pertinent laboratory results and imaging. I have explained the risks of anesthesia including but not limited to dental damage, corneal abrasion, nerve injury, MI, stroke, and death. Questions asked and answered. Anesthetic plan discussed with patient and team as indicated. Patient expressed understanding of the above.  )  intravenous induction     Anesthetic plan, risks, benefits, and alternatives have been  provided, discussed and informed consent has been obtained with: patient.

## 2023-04-11 NOTE — ANESTHESIA PROCEDURE NOTES
Airway  Urgency: elective    Date/Time: 4/11/2023 9:36 AM  Airway not difficult    General Information and Staff    Patient location during procedure: OR  CRNA/CAA: Jasmin Jaquez CRNA    Indications and Patient Condition  Indications for airway management: airway protection    Preoxygenated: yes  Mask difficulty assessment: 1 - vent by mask    Final Airway Details  Final airway type: supraglottic airway      Successful airway: classic  Size 3     Number of attempts at approach: 1  Assessment: lips, teeth, and gum same as pre-op    Additional Comments  LMA placed easily.  Cuff MOP.

## 2023-04-12 ENCOUNTER — TELEPHONE (OUTPATIENT)
Dept: SURGERY | Facility: CLINIC | Age: 83
End: 2023-04-12
Payer: MEDICARE

## 2023-04-12 LAB
LAB AP CASE REPORT: NORMAL
LAB AP CLINICAL INFORMATION: NORMAL
LAB AP DIAGNOSIS COMMENT: NORMAL
PATH REPORT.FINAL DX SPEC: NORMAL
PATH REPORT.GROSS SPEC: NORMAL

## 2023-04-12 NOTE — TELEPHONE ENCOUNTER
I called Ms. Franz to discuss her pathology report. She is recovering well from surgery. I will see her at her scheduled postoperative appointment.     Ariela Zaragoza MD

## 2023-04-18 ENCOUNTER — TELEPHONE (OUTPATIENT)
Dept: SURGERY | Facility: CLINIC | Age: 83
End: 2023-04-18
Payer: MEDICARE

## 2023-04-18 NOTE — TELEPHONE ENCOUNTER
Patient called with c/o of rash in between her breast (chest area).   She has noticed this for about 3-4 days. Surgical site is well and healing.  I instructed her to clean the area with a cool wash cloth, take 1 zyrtec daily until rash is gone, and take benadryl as needed for the itching.    OR Date: 4/11/2023 Left breast needle-localized excisional biopsy     Post op: 4/26/2023    I let her know to call back if it worsen, and I will call and check on her Wednesday/Thursday.

## 2023-04-24 RX ORDER — ROSUVASTATIN CALCIUM 10 MG/1
TABLET, COATED ORAL
Qty: 90 TABLET | Refills: 1 | Status: SHIPPED | OUTPATIENT
Start: 2023-04-24

## 2023-04-24 NOTE — TELEPHONE ENCOUNTER
Patient informed of prior message and voiced understanding. Patient was told by outpatient therapy to use a compression stocking, patient is doing so and is working well with the swelling patient continuing with  told by RBB to take 1 asa 325 mg daily   
Pt also reports a lot of swelling, they were wrapping R knee in rehab. She has been using ice to help with swelling.   
Would recommend continued ice and compression.  Still would recommend enteric-coated aspirin 325 mg twice a day with food  
yes

## 2023-04-26 ENCOUNTER — OFFICE VISIT (OUTPATIENT)
Dept: SURGERY | Facility: CLINIC | Age: 83
End: 2023-04-26
Payer: MEDICARE

## 2023-04-26 VITALS
RESPIRATION RATE: 16 BRPM | WEIGHT: 141 LBS | OXYGEN SATURATION: 95 % | HEART RATE: 50 BPM | DIASTOLIC BLOOD PRESSURE: 76 MMHG | SYSTOLIC BLOOD PRESSURE: 118 MMHG | HEIGHT: 61 IN | BODY MASS INDEX: 26.62 KG/M2

## 2023-04-26 DIAGNOSIS — Z48.89 POSTOPERATIVE VISIT: Primary | ICD-10-CM

## 2023-04-26 PROCEDURE — 3074F SYST BP LT 130 MM HG: CPT | Performed by: SURGERY

## 2023-04-26 PROCEDURE — 99024 POSTOP FOLLOW-UP VISIT: CPT | Performed by: SURGERY

## 2023-04-26 PROCEDURE — 1160F RVW MEDS BY RX/DR IN RCRD: CPT | Performed by: SURGERY

## 2023-04-26 PROCEDURE — 1159F MED LIST DOCD IN RCRD: CPT | Performed by: SURGERY

## 2023-04-26 PROCEDURE — 3078F DIAST BP <80 MM HG: CPT | Performed by: SURGERY

## 2023-04-26 NOTE — LETTER
April 26, 2023       No Recipients    Patient: Kristie Franz   YOB: 1940   Date of Visit: 4/26/2023       Dear Dr. Baez Recipients:    Thank you for referring Kristie Franz to me for evaluation. Below are the relevant portions of my assessment and plan of care.    If you have questions, please do not hesitate to call me. I look forward to following Kristie along with you.         Sincerely,        Ariela Zaragoza MD        CC:   No Recipients    Ariela Zaragoza MD  04/26/23 0819  Signed  Joint venture between [facility]     Referring Provider: Mariia Trujillo MD     Chief complaint: Postoperative visit     Subjective    HPI:   1/27/2023, Visit with Jama Vanegas APRN:  Ms. Kristie Franz is a 83 yo woman, seen at the request of Mariia Trujillo MD, for abnormal breast imaging  She has a personal history of stroke, hypertension, hyperlipidemia, hypothyroidism, atrial tachycardia, right breast cyst aspiration unknown year  She denies any family history of breast or ovarian cancer.   Today she presents with concerns regarding recent mammogram results and need of biopsy  She denies any breast lumps, pain, skin changes, or nipple discharge.    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  She had a bruise and some pain in her left breast after the biopsy, but this is improving.       4/26/2023, Interval History:  She underwent left breast excisional biopsy on 4/11/23. See surgery & pathology details below in oncologic history. She called the office last week complaining of a rash between her breasts, this has since improved.      Breast history/imaging (updated 4/26/2023):    11/09/2020, Screening MMG ([facility])  The breast are extremely dense.   1. There is a focal stable asymmetry seen in the posterior one-third upper outer region of the left breast. The parenchyma includes multiple other stable nodular asymmetries. No suspicious masses,  suspicious micro calcifications or areas of architectural distortion are identified.  2. The parenchyma of the right breasts also includes stable nodular asymmetries. No suspicious masses, suspicious micro calcifications or areas of architectural distortion are identified.  BI-RADS 2: Benign     11/23/2021, Screening MMG ([facility])  The breast are extremely dense.   There is a focal asymmetry seen in the upper outer region of the left breast. There are no significant changes from the prior exam(s). The parenchyma includes stable nodular asymmetries. No suspicious masses, suspicious micro calcifications or areas of architectural distortion are identified.  In the right breast, no suspicious masses, significant calcifications or other abnormalities are seen.  BI-RADS 2: Benign    11/28/2022, Screening MMG with [facility] ( [facility])  The breasts are heterogeneously dense.  There is questioned architectural distortion projecting slightly below the nipple in the anterior right breast on the MLO view. There is questioned architectural distortion in the upper outer posterior left breast. These are best appreciated on Tomosynthesis. There are no suspicious masses or calcifications in either breast.   BI-RADS 0: Incomplete    01/24/2023, Bilateral Diagnostic MMG with [facility] & Bilateral Limited Breast US ( [facility])  MMG:  In the outer central posterior left breast there is persistent mild architectural distortion.   Previously questioned architectural distortion projecting slightly below the nipple in the anterior right breast partially effaces with spot compression and localizes to the outer breast on Tomosynthesis.  US:  LEFT:  At 2:00, 2 cm from the nipple, there is a 0.6 cm benign-appearing cyst. At 2:00, 6 cm from the nipple, there is a 2.1 x 1.0 x 1.9 cm oval hypoechoic mass corresponding to a mammographically stable focal asymmetry, which can be considered benign. This is in the region of the mammographic distortion,  and there is slightly irregular tissue adjacent to the mass, however, a discrete correlate for the mammographic distortion is not clearly visualized.  RIGHT:  At 8:00, 3 cm from nipple, there is a 0.3 cm benign-appearing cyst. In the retro areolar right breast, there is a 0.9 x 0.5 x 1.1 cm benign-appearing minimally complicated cyst.  BI-RADS 4: Suspicious    02/03/2023, Left Breast, Stereotactic Biopsy ([facility]):  1. Left Breast at 3:30 o’clock (not for calcifications), Stereotactic Core Biopsy:               A. Intraductal papilloma, 2.2 mm.               B. Associated hyalinized fibroadenomatoid hyperplasia with rare adenosis and usual duct hyperplasia.               C. Rare minute micro calcification noted within benign breast tissue.               D. No atypical hyperplasia, carcinoma in-situ nor invasive carcinoma identified.  -Bowtie clip.    4/11/2023, Left breast needle-localized excisional biopsy:  1. Breast, Left, Lumpectomy (9 grams):               A. Residual intraductal papilloma, 2 mm maximally, identified adjacent to bowtie clip and biopsy site change,       completely excised.      Review of Systems:  See interval history.      Medications:    Current Outpatient Medications:   •  acetaminophen (TYLENOL) 500 MG tablet, Take 1 tablet by mouth Every 6 (Six) Hours As Needed for Mild Pain., Disp: , Rfl:   •  aspirin (aspirin) 81 MG EC tablet, Take 1 tablet by mouth Daily. (Patient taking differently: Take 1 tablet by mouth After Lunch.), Disp: , Rfl:   •  atenolol (TENORMIN) 25 MG tablet, Take 0.5 tablets by mouth Daily. (Patient taking differently: Take 1 tablet by mouth Daily Before Supper.), Disp: 90 tablet, Rfl: 3  •  citalopram (CeleXA) 10 MG tablet, Take 1 tablet by mouth Daily. (Patient taking differently: Take 1 tablet by mouth After Dinner.), Disp: 90 tablet, Rfl: 1  •  hydroCHLOROthiazide (HYDRODIURIL) 25 MG tablet, Take 1 tablet by mouth Daily. (Patient taking differently: Take 1 tablet by  mouth Every Morning.), Disp: 90 tablet, Rfl: 1  •  levothyroxine (SYNTHROID, LEVOTHROID) 50 MCG tablet, Take 1 tablet by mouth Daily. (Patient taking differently: Take 1 tablet by mouth Every Morning.), Disp: 90 tablet, Rfl: 3  •  losartan (COZAAR) 25 MG tablet, TAKE 1 TABLET BY MOUTH DAILY (Patient taking differently: Take 1 tablet by mouth Every Morning.), Disp: 90 tablet, Rfl: 1  •  rosuvastatin (CRESTOR) 10 MG tablet, TAKE 1 TABLET BY MOUTH EVERY DAY, Disp: 90 tablet, Rfl: 1      Allergies   Allergen Reactions   • Amoxicillin Rash       Family History   Problem Relation Age of Onset   • Diabetes Mother    • Hypertension Mother    • Arthritis Mother    • Heart attack Father          age 67   • Heart disease Father         Heart Attack   • Cancer Brother    • Colon polyps Daughter    • Colon polyps Daughter    • Malig Hyperthermia Neg Hx    • Colon cancer Neg Hx    • Crohn's disease Neg Hx    • Inflammatory bowel disease Neg Hx    • Ulcerative colitis Neg Hx        Objective    PHYSICAL EXAMINATION:   Vitals:    23 0801   BP: 118/76   Pulse: 50   Resp: 16   SpO2: 95%     ECOG 0 - Asymptomatic  General: NAD, well appearing  Psych: a&o x 3, normal mood and affect  Eyes: EOMI, no scleral icterus  ENMT: neck supple without masses or thyromegaly, mucus membranes moist  MSK: normal gait, normal ROM in bilateral shoulders  Lymph nodes: no cervical, supraclavicular or axillary lymphadenopathy  Breast: symmetric, moderate size, grade 3 ptosis  Right: deferred.  Left: Well-healing lower outer radial incision with Dermabond intact.  No swelling.      Assessment & Plan   Assessment:  82 y.o. F sp left breast excisional biopsy of an intraductal papilloma on 2023 with benign final pathology.    Plan:  -F/u with me as necessary. She will be due for annual screening mammogram in 2023.  -She was instructed to call with any questions, concerns.    Ariela Zaragoza MD      CC:  Mariia Trujillo MD

## 2023-04-26 NOTE — PROGRESS NOTES
[facility]     Referring Provider: Mariia Trujillo MD     Chief complaint: Postoperative visit     Subjective   HPI:   1/27/2023, Visit with FRED Gómez:  Ms. Kristie Franz is a 81 yo woman, seen at the request of Mariia Trujillo MD, for abnormal breast imaging  She has a personal history of stroke, hypertension, hyperlipidemia, hypothyroidism, atrial tachycardia, right breast cyst aspiration unknown year  She denies any family history of breast or ovarian cancer.   Today she presents with concerns regarding recent mammogram results and need of biopsy  She denies any breast lumps, pain, skin changes, or nipple discharge.    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  She had a bruise and some pain in her left breast after the biopsy, but this is improving.       4/26/2023, Interval History:  She underwent left breast excisional biopsy on 4/11/23. See surgery & pathology details below in oncologic history. She called the office last week complaining of a rash between her breasts, this has since improved.      Breast history/imaging (updated 4/26/2023):    11/09/2020, Screening MMG ([facility])  The breast are extremely dense.   1. There is a focal stable asymmetry seen in the posterior one-third upper outer region of the left breast. The parenchyma includes multiple other stable nodular asymmetries. No suspicious masses, suspicious micro calcifications or areas of architectural distortion are identified.  2. The parenchyma of the right breasts also includes stable nodular asymmetries. No suspicious masses, suspicious micro calcifications or areas of architectural distortion are identified.  BI-RADS 2: Benign     11/23/2021, Screening MMG ([facility])  The breast are extremely dense.   There is a focal asymmetry seen in the upper outer region of the left breast. There are no significant changes from the prior exam(s). The  parenchyma includes stable nodular asymmetries. No suspicious masses, suspicious micro calcifications or areas of architectural distortion are identified.  In the right breast, no suspicious masses, significant calcifications or other abnormalities are seen.  BI-RADS 2: Benign    11/28/2022, Screening MMG with [facility] ( [facility])  The breasts are heterogeneously dense.  There is questioned architectural distortion projecting slightly below the nipple in the anterior right breast on the MLO view. There is questioned architectural distortion in the upper outer posterior left breast. These are best appreciated on Tomosynthesis. There are no suspicious masses or calcifications in either breast.   BI-RADS 0: Incomplete    01/24/2023, Bilateral Diagnostic MMG with [facility] & Bilateral Limited Breast US ( [facility])  MMG:  In the outer central posterior left breast there is persistent mild architectural distortion.   Previously questioned architectural distortion projecting slightly below the nipple in the anterior right breast partially effaces with spot compression and localizes to the outer breast on Tomosynthesis.  US:  LEFT:  At 2:00, 2 cm from the nipple, there is a 0.6 cm benign-appearing cyst. At 2:00, 6 cm from the nipple, there is a 2.1 x 1.0 x 1.9 cm oval hypoechoic mass corresponding to a mammographically stable focal asymmetry, which can be considered benign. This is in the region of the mammographic distortion, and there is slightly irregular tissue adjacent to the mass, however, a discrete correlate for the mammographic distortion is not clearly visualized.  RIGHT:  At 8:00, 3 cm from nipple, there is a 0.3 cm benign-appearing cyst. In the retro areolar right breast, there is a 0.9 x 0.5 x 1.1 cm benign-appearing minimally complicated cyst.  BI-RADS 4: Suspicious    02/03/2023, Left Breast, Stereotactic Biopsy ( [facility]):  1. Left Breast at 3:30 o’clock (not for calcifications), Stereotactic Core Biopsy:                A. Intraductal papilloma, 2.2 mm.               B. Associated hyalinized fibroadenomatoid hyperplasia with rare adenosis and usual duct hyperplasia.               C. Rare minute micro calcification noted within benign breast tissue.               D. No atypical hyperplasia, carcinoma in-situ nor invasive carcinoma identified.  -Bowtie clip.    4/11/2023, Left breast needle-localized excisional biopsy:  1. Breast, Left, Lumpectomy (9 grams):               A. Residual intraductal papilloma, 2 mm maximally, identified adjacent to bowtie clip and biopsy site change,       completely excised.      Review of Systems:  See interval history.      Medications:    Current Outpatient Medications:   •  acetaminophen (TYLENOL) 500 MG tablet, Take 1 tablet by mouth Every 6 (Six) Hours As Needed for Mild Pain., Disp: , Rfl:   •  aspirin (aspirin) 81 MG EC tablet, Take 1 tablet by mouth Daily. (Patient taking differently: Take 1 tablet by mouth After Lunch.), Disp: , Rfl:   •  atenolol (TENORMIN) 25 MG tablet, Take 0.5 tablets by mouth Daily. (Patient taking differently: Take 1 tablet by mouth Daily Before Supper.), Disp: 90 tablet, Rfl: 3  •  citalopram (CeleXA) 10 MG tablet, Take 1 tablet by mouth Daily. (Patient taking differently: Take 1 tablet by mouth After Dinner.), Disp: 90 tablet, Rfl: 1  •  hydroCHLOROthiazide (HYDRODIURIL) 25 MG tablet, Take 1 tablet by mouth Daily. (Patient taking differently: Take 1 tablet by mouth Every Morning.), Disp: 90 tablet, Rfl: 1  •  levothyroxine (SYNTHROID, LEVOTHROID) 50 MCG tablet, Take 1 tablet by mouth Daily. (Patient taking differently: Take 1 tablet by mouth Every Morning.), Disp: 90 tablet, Rfl: 3  •  losartan (COZAAR) 25 MG tablet, TAKE 1 TABLET BY MOUTH DAILY (Patient taking differently: Take 1 tablet by mouth Every Morning.), Disp: 90 tablet, Rfl: 1  •  rosuvastatin (CRESTOR) 10 MG tablet, TAKE 1 TABLET BY MOUTH EVERY DAY, Disp: 90 tablet, Rfl: 1      Allergies   Allergen Reactions    • Amoxicillin Rash       Family History   Problem Relation Age of Onset   • Diabetes Mother    • Hypertension Mother    • Arthritis Mother    • Heart attack Father          age 67   • Heart disease Father         Heart Attack   • Cancer Brother    • Colon polyps Daughter    • Colon polyps Daughter    • Malig Hyperthermia Neg Hx    • Colon cancer Neg Hx    • Crohn's disease Neg Hx    • Inflammatory bowel disease Neg Hx    • Ulcerative colitis Neg Hx        Objective   PHYSICAL EXAMINATION:   Vitals:    23 0801   BP: 118/76   Pulse: 50   Resp: 16   SpO2: 95%     ECOG 0 - Asymptomatic  General: NAD, well appearing  Psych: a&o x 3, normal mood and affect  Eyes: EOMI, no scleral icterus  ENMT: neck supple without masses or thyromegaly, mucus membranes moist  MSK: normal gait, normal ROM in bilateral shoulders  Lymph nodes: no cervical, supraclavicular or axillary lymphadenopathy  Breast: symmetric, moderate size, grade 3 ptosis  Right: deferred.  Left: Well-healing lower outer radial incision with Dermabond intact.  No swelling.      Assessment & Plan   Assessment:  82 y.o. F sp left breast excisional biopsy of an intraductal papilloma on 2023 with benign final pathology.    Plan:  -F/u with me as necessary. She will be due for annual screening mammogram in 2023.  -She was instructed to call with any questions, concerns.    Ariela Zaragoza MD      CC:  Mariia Trujillo MD

## 2023-05-01 RX ORDER — LOSARTAN POTASSIUM 25 MG/1
25 TABLET ORAL DAILY
Qty: 90 TABLET | Refills: 1 | Status: SHIPPED | OUTPATIENT
Start: 2023-05-01

## 2023-05-18 ENCOUNTER — OFFICE VISIT (OUTPATIENT)
Dept: ORTHOPEDIC SURGERY | Facility: CLINIC | Age: 83
End: 2023-05-18
Payer: MEDICARE

## 2023-05-18 VITALS — TEMPERATURE: 97.8 F | BODY MASS INDEX: 26.6 KG/M2 | WEIGHT: 140.9 LBS | HEIGHT: 61 IN

## 2023-05-18 DIAGNOSIS — M17.12 PRIMARY OSTEOARTHRITIS OF LEFT KNEE: ICD-10-CM

## 2023-05-18 DIAGNOSIS — R52 PAIN: Primary | ICD-10-CM

## 2023-05-18 RX ORDER — METHYLPREDNISOLONE ACETATE 80 MG/ML
80 INJECTION, SUSPENSION INTRA-ARTICULAR; INTRALESIONAL; INTRAMUSCULAR; SOFT TISSUE
Status: COMPLETED | OUTPATIENT
Start: 2023-05-18 | End: 2023-05-18

## 2023-05-18 RX ADMIN — METHYLPREDNISOLONE ACETATE 80 MG: 80 INJECTION, SUSPENSION INTRA-ARTICULAR; INTRALESIONAL; INTRAMUSCULAR; SOFT TISSUE at 17:48

## 2023-05-18 NOTE — PROGRESS NOTES
Patient: Kristie Franz  YOB: 1940 82 y.o. female  Medical Record Number: [id number]    Chief Complaints:   Chief Complaint   Patient presents with   • Left Knee - Follow-up, Pain       History of Present Illness:Kristie Franz is a 82 y.o. female who presents for follow-up of left knee pain she has an aching pain diffusely throughout the knee has been ongoing for years has gotten worse recently.    Allergies:   Allergies   Allergen Reactions   • Amoxicillin Rash       Medications:   Current Outpatient Medications   Medication Sig Dispense Refill   • acetaminophen (TYLENOL) 500 MG tablet Take 1 tablet by mouth Every 6 (Six) Hours As Needed for Mild Pain.     • aspirin (aspirin) 81 MG EC tablet Take 1 tablet by mouth Daily. (Patient taking differently: Take 1 tablet by mouth After Lunch. Indications: restart on Friday 4/14/23)     • atenolol (TENORMIN) 25 MG tablet Take 0.5 tablets by mouth Daily. (Patient taking differently: Take 1 tablet by mouth Daily Before Supper.) 90 tablet 3   • citalopram (CeleXA) 10 MG tablet Take 1 tablet by mouth Daily. (Patient taking differently: Take 1 tablet by mouth After Dinner.) 90 tablet 1   • hydroCHLOROthiazide (HYDRODIURIL) 25 MG tablet Take 1 tablet by mouth Daily. (Patient taking differently: Take 1 tablet by mouth Every Morning.) 90 tablet 1   • ibuprofen (ADVIL,MOTRIN) 100 MG/5ML suspension Take 5 mg/kg by mouth Every 6 (Six) Hours As Needed for Mild Pain.     • levothyroxine (SYNTHROID, LEVOTHROID) 50 MCG tablet Take 1 tablet by mouth Daily. (Patient taking differently: Take 1 tablet by mouth Every Morning.) 90 tablet 3   • losartan (COZAAR) 25 MG tablet TAKE 1 TABLET BY MOUTH DAILY 90 tablet 1   • rosuvastatin (CRESTOR) 10 MG tablet TAKE 1 TABLET BY MOUTH EVERY DAY 90 tablet 1     No current facility-administered medications for this visit.         The following portions of the patient's history were reviewed and updated as appropriate: allergies, current  Problem: Patient Care Overview  Goal: Plan of Care Review  Outcome: Ongoing (interventions implemented as appropriate)  Side rails up x2; call bell in place; bed in lowest, locked position; skid proof socks on; no evidence of skin breakdown; care plan explained to patient; pt remains free of injury. Pt with mucositis, not tolerating po, voids, ambulates, pt with c/o pain, oxy IR given, pt stated he had relief. Clinimix infusing at 75cc /hr. Cefepime given as scheduled. CT of abdomen and pelvis with contrast in ordered. VSS and afebrile.        "medications, past family history, past medical history, past social history, past surgical history and problem list.    Review of Systems:   Pertinent positives/negative listed in HPI above    Physical Exam:   Vitals:    05/18/23 0912   Temp: 97.8 °F (36.6 °C)   TempSrc: Temporal   Weight: 63.9 kg (140 lb 14.4 oz)   Height: 154.9 cm (60.98\")       General: A and O x 3, ASA, NAD      Knee Exam List: Knee:  left    ALIGNMENT:     Neutral  ,   Patella tracks   midline    GAIT:    Antalgic    SKIN:    No abnormality    RANGE OF MOTION:   Painful flexion    STRENGTH:   4 / 5    LIGAMENTS:    No varus / valgus instability.   Negative  Lachman.    MENISCUS:     Positive  medial   Evan       DISTAL PULSES:    Paplable    DISTAL SENSATION :   Intact    LYMPHATICS:     No   lymphadenopathy    OTHER:          - Positive trace effusion      - No crepitance with ROM       Radiology:  Xrays 3views left knee (ap,lateral, sunrise) were ordered and reviewed for evaluation of knee pain demonstratingmoderate joint space narrowing and chondrocalcinosis previous x-rays not available.      Assessment/Plan:  Left knee osteoarthritis with chondrocalcinosis injected as below recommended physical therapy exercises.  I will see her back as needed.  Large Joint Arthrocentesis: L knee  Date/Time: 5/18/2023 5:48 PM  Consent given by: patient  Site marked: site marked  Timeout: Immediately prior to procedure a time out was called to verify the correct patient, procedure, equipment, support staff and site/side marked as required   Supporting Documentation  Indications: pain and joint swelling   Procedure Details  Location: knee - L knee  Preparation: Patient was prepped and draped in the usual sterile fashion  Needle size: 22 G  Approach: anterolateral  Medications administered: 80 mg methylPREDNISolone acetate 80 MG/ML; 2 mL lidocaine (cardiac)  Patient tolerance: patient tolerated the procedure well with no immediate " complications              Diagnoses and all orders for this visit:    1. Pain (Primary)  -     XR Knee 3 View Left        Trevon Hunt MD  5/18/2023

## 2023-06-02 DIAGNOSIS — E03.8 OTHER SPECIFIED HYPOTHYROIDISM: ICD-10-CM

## 2023-06-02 DIAGNOSIS — D64.9 ANEMIA, UNSPECIFIED TYPE: Primary | ICD-10-CM

## 2023-06-02 DIAGNOSIS — E78.2 MIXED HYPERLIPIDEMIA: ICD-10-CM

## 2023-06-02 DIAGNOSIS — I10 PRIMARY HYPERTENSION: ICD-10-CM

## 2023-06-02 DIAGNOSIS — E78.49 OTHER HYPERLIPIDEMIA: ICD-10-CM

## 2023-06-06 LAB
ALBUMIN SERPL-MCNC: 4.2 G/DL (ref 3.5–5.2)
ALBUMIN/GLOB SERPL: 2 G/DL
ALP SERPL-CCNC: 66 U/L (ref 39–117)
ALT SERPL-CCNC: 18 U/L (ref 1–33)
AST SERPL-CCNC: 20 U/L (ref 1–32)
BILIRUB SERPL-MCNC: 1.1 MG/DL (ref 0–1.2)
BUN SERPL-MCNC: 21 MG/DL (ref 8–23)
BUN/CREAT SERPL: 23.3 (ref 7–25)
CALCIUM SERPL-MCNC: 10 MG/DL (ref 8.6–10.5)
CHLORIDE SERPL-SCNC: 103 MMOL/L (ref 98–107)
CHOLEST SERPL-MCNC: 158 MG/DL (ref 0–200)
CO2 SERPL-SCNC: 31.6 MMOL/L (ref 22–29)
CREAT SERPL-MCNC: 0.9 MG/DL (ref 0.57–1)
EGFRCR SERPLBLD CKD-EPI 2021: 64 ML/MIN/1.73
ERYTHROCYTE [DISTWIDTH] IN BLOOD BY AUTOMATED COUNT: 12.3 % (ref 12.3–15.4)
GLOBULIN SER CALC-MCNC: 2.1 GM/DL
GLUCOSE SERPL-MCNC: 96 MG/DL (ref 65–99)
HCT VFR BLD AUTO: 36.8 % (ref 34–46.6)
HDLC SERPL-MCNC: 81 MG/DL (ref 40–60)
HGB BLD-MCNC: 12.5 G/DL (ref 12–15.9)
LDLC SERPL CALC-MCNC: 65 MG/DL (ref 0–100)
LDLC/HDLC SERPL: 0.8 {RATIO}
MCH RBC QN AUTO: 31.7 PG (ref 26.6–33)
MCHC RBC AUTO-ENTMCNC: 34 G/DL (ref 31.5–35.7)
MCV RBC AUTO: 93.4 FL (ref 79–97)
PLATELET # BLD AUTO: 163 10*3/MM3 (ref 140–450)
POTASSIUM SERPL-SCNC: 4.1 MMOL/L (ref 3.5–5.2)
PROT SERPL-MCNC: 6.3 G/DL (ref 6–8.5)
RBC # BLD AUTO: 3.94 10*6/MM3 (ref 3.77–5.28)
SODIUM SERPL-SCNC: 140 MMOL/L (ref 136–145)
TRIGL SERPL-MCNC: 60 MG/DL (ref 0–150)
TSH SERPL DL<=0.005 MIU/L-ACNC: 1.17 UIU/ML (ref 0.45–4.5)
VLDLC SERPL CALC-MCNC: 12 MG/DL (ref 5–40)
WBC # BLD AUTO: 4.88 10*3/MM3 (ref 3.4–10.8)

## 2023-06-12 ENCOUNTER — OFFICE VISIT (OUTPATIENT)
Dept: INTERNAL MEDICINE | Facility: CLINIC | Age: 83
End: 2023-06-12
Payer: MEDICARE

## 2023-06-12 VITALS
WEIGHT: 139 LBS | TEMPERATURE: 97.5 F | SYSTOLIC BLOOD PRESSURE: 120 MMHG | HEART RATE: 55 BPM | BODY MASS INDEX: 26.24 KG/M2 | HEIGHT: 61 IN | OXYGEN SATURATION: 97 % | DIASTOLIC BLOOD PRESSURE: 60 MMHG

## 2023-06-12 DIAGNOSIS — F41.8 SITUATIONAL ANXIETY: ICD-10-CM

## 2023-06-12 DIAGNOSIS — E78.2 MIXED HYPERLIPIDEMIA: ICD-10-CM

## 2023-06-12 DIAGNOSIS — E03.9 ACQUIRED HYPOTHYROIDISM: ICD-10-CM

## 2023-06-12 DIAGNOSIS — I10 PRIMARY HYPERTENSION: Primary | ICD-10-CM

## 2023-06-12 RX ORDER — ATENOLOL 25 MG/1
25 TABLET ORAL
Qty: 90 TABLET | Refills: 1 | Status: SHIPPED | OUTPATIENT
Start: 2023-06-12

## 2023-06-12 RX ORDER — HYDROCHLOROTHIAZIDE 25 MG/1
25 TABLET ORAL DAILY
Qty: 90 TABLET | Refills: 1 | Status: SHIPPED | OUTPATIENT
Start: 2023-06-12

## 2023-06-12 RX ORDER — CITALOPRAM 10 MG/1
10 TABLET ORAL DAILY
Qty: 90 TABLET | Refills: 1 | Status: SHIPPED | OUTPATIENT
Start: 2023-06-12

## 2023-06-12 RX ORDER — LOSARTAN POTASSIUM 25 MG/1
25 TABLET ORAL DAILY
Qty: 90 TABLET | Refills: 1 | Status: SHIPPED | OUTPATIENT
Start: 2023-06-12

## 2023-06-12 NOTE — PROGRESS NOTES
Subjective   Kristie Franz is a 82 y.o. female.   Chief Complaint   Patient presents with    Anxiety    Hypothyroidism    Hypertension    Hyperlipidemia     1.Hypertension-  patient is on HCTZ and 25 mg a day, losartan 25 mg a day and atenolol at 25 mg a day.  She takes it every day. She has no side effects.  She has no chest pain, no shortness of breath, no lightheadedness.  Creatinine 0.9, GFR 64.  She signed up for classes and is going to the gym twice a week.  She follows up with cardiologist.      2. Hypothyroidism-she has no history of thyroid surgery.  She is on levothyroxine 50 mcg a day.  She takes it every day on empty stomach and does not eat for at least 30 minutes after taking it.  TSH is at 1.17.    3.Situational anxiety- on citalopram at 10 mg a day.  She takes it every day.  Mood is normal.  She had some worries, but not excessive.  No side effects. No suicidal ideations or aggressive behaviors. PHQ-9 at 3, BISHNU-7 is 1.    4. Hyperlipidemia- on Crestor 10 mg a day.  She takes it every day.  She has no side effects.  No muscle aches.  LDL 65, HDL 81.  LFTs normal.    Review of Systems   Respiratory:  Negative for shortness of breath.    Cardiovascular:  Negative for chest pain and palpitations.   Musculoskeletal:  Positive for arthralgias. Negative for myalgias.   Neurological:  Negative for light-headedness.   Psychiatric/Behavioral:  Negative for suicidal ideas.        Objective   Wt Readings from Last 3 Encounters:   06/12/23 63 kg (139 lb)   05/18/23 63.9 kg (140 lb 14.4 oz)   04/26/23 64 kg (141 lb)      Vitals:    06/12/23 1118   BP: 120/60   Pulse: 55   Temp: 97.5 °F (36.4 °C)   SpO2: 97%     Temp Readings from Last 3 Encounters:   06/12/23 97.5 °F (36.4 °C)   05/18/23 97.8 °F (36.6 °C) (Temporal)   04/11/23 97.8 °F (36.6 °C) (Oral)     BP Readings from Last 3 Encounters:   06/12/23 120/60   04/26/23 118/76   04/11/23 116/70     Pulse Readings from Last 3 Encounters:   06/12/23 55   04/26/23 50    04/11/23 58     Body mass index is 26.28 kg/m².    Physical Exam  Constitutional:       Appearance: She is well-developed.   Neck:      Thyroid: No thyromegaly.      Vascular: No carotid bruit.   Cardiovascular:      Rate and Rhythm: Normal rate and regular rhythm.      Heart sounds: Normal heart sounds.   Pulmonary:      Effort: Pulmonary effort is normal.      Breath sounds: Normal breath sounds.   Neurological:      Mental Status: She is alert.   Psychiatric:         Behavior: Behavior normal.       Assessment & Plan   Diagnoses and all orders for this visit:    1. Primary hypertension (Primary)    2. Mixed hyperlipidemia    3. Situational anxiety    4. Acquired hypothyroidism    Other orders  -     citalopram (CeleXA) 10 MG tablet; Take 1 tablet by mouth Daily.  Dispense: 90 tablet; Refill: 1  -     atenolol (TENORMIN) 25 MG tablet; Take 1 tablet by mouth Daily Before Supper.  Dispense: 90 tablet; Refill: 1  -     hydroCHLOROthiazide (HYDRODIURIL) 25 MG tablet; Take 1 tablet by mouth Daily.  Dispense: 90 tablet; Refill: 1  -     losartan (COZAAR) 25 MG tablet; Take 1 tablet by mouth Daily.  Dispense: 90 tablet; Refill: 1        1.  Hypertension-continue current treatment.  Follow-up in 6 months.  2.  Hyperlipidemia-continue current treatment.  Follow-up in 6 months.  3.  Situational anxiety-continue current treatment.  Follow-up in 6 months.  4.  Hypothyroidism-continue current treatment.  Follow-up in 12 months.

## 2023-08-02 NOTE — PROGRESS NOTES
Physical Therapy Daily Progress Note    Visit #16    Subjective     Kristie Franz reports: she went to the mall yesterday, knee is a little sore and hamstrings feel tight.      Objective   See Exercise, Manual, and Modality Logs for complete treatment.       Assessment/Plan  Added heat to HS and increased STM, improved pain and gait after.   Progress per Plan of Care           Manual Therapy:    23     mins  23442;  Therapeutic Exercise:    32     mins  24472;   direct  Neuromuscular Honey:    0    mins  32530;    Therapeutic Activity:     0     mins  72876;     Gait Trainin     mins  51353;     Ultrasound:     0     mins  73352;    Electrical Stimulation:    0     mins  22064 ( );    Timed Treatment:   55   mins   Total Treatment:     70   mins    Danyelle Stokes PT, DPT  Physical Therapist  [place] License #[id number]                    
Manoj Berry(PA)

## 2023-08-29 ENCOUNTER — OFFICE VISIT (OUTPATIENT)
Dept: INTERNAL MEDICINE | Facility: CLINIC | Age: 83
End: 2023-08-29
Payer: MEDICARE

## 2023-08-29 VITALS
DIASTOLIC BLOOD PRESSURE: 70 MMHG | HEART RATE: 56 BPM | HEIGHT: 61 IN | OXYGEN SATURATION: 97 % | SYSTOLIC BLOOD PRESSURE: 108 MMHG | BODY MASS INDEX: 26.47 KG/M2 | TEMPERATURE: 97.5 F | WEIGHT: 140.2 LBS

## 2023-08-29 DIAGNOSIS — J02.9 SORE THROAT: ICD-10-CM

## 2023-08-29 DIAGNOSIS — J02.9 ACUTE PHARYNGITIS, UNSPECIFIED ETIOLOGY: Primary | ICD-10-CM

## 2023-08-29 LAB
EXPIRATION DATE: NORMAL
EXPIRATION DATE: NORMAL
FLUAV AG UPPER RESP QL IA.RAPID: NOT DETECTED
FLUBV AG UPPER RESP QL IA.RAPID: NOT DETECTED
INTERNAL CONTROL: NORMAL
INTERNAL CONTROL: NORMAL
Lab: NORMAL
Lab: NORMAL
S PYO AG THROAT QL: NEGATIVE
SARS-COV-2 AG UPPER RESP QL IA.RAPID: NOT DETECTED

## 2023-08-29 PROCEDURE — 87880 STREP A ASSAY W/OPTIC: CPT | Performed by: NURSE PRACTITIONER

## 2023-08-29 PROCEDURE — 99213 OFFICE O/P EST LOW 20 MIN: CPT | Performed by: NURSE PRACTITIONER

## 2023-08-29 PROCEDURE — 1159F MED LIST DOCD IN RCRD: CPT | Performed by: NURSE PRACTITIONER

## 2023-08-29 PROCEDURE — 87428 SARSCOV & INF VIR A&B AG IA: CPT | Performed by: NURSE PRACTITIONER

## 2023-08-29 PROCEDURE — 3078F DIAST BP <80 MM HG: CPT | Performed by: NURSE PRACTITIONER

## 2023-08-29 PROCEDURE — 3074F SYST BP LT 130 MM HG: CPT | Performed by: NURSE PRACTITIONER

## 2023-08-29 PROCEDURE — 1160F RVW MEDS BY RX/DR IN RCRD: CPT | Performed by: NURSE PRACTITIONER

## 2023-08-29 RX ORDER — AZITHROMYCIN 250 MG/1
TABLET, FILM COATED ORAL
Qty: 6 TABLET | Refills: 0 | Status: SHIPPED | OUTPATIENT
Start: 2023-08-29

## 2023-08-29 NOTE — PROGRESS NOTES
Subjective   Kristie Franz is a 82 y.o. female. Patient is here today for   Chief Complaint   Patient presents with    Cough    Ear Fullness          .    History of Present Illness   C/o sore throat x 3 days. It hurts to swallow. It feels like stabbing in her throat. She has had some post-nasal drainage, mild cough. She has taken allegra with no relief. She has taken tylenol which helps some.     The following portions of the patient's history were reviewed and updated as appropriate: allergies, current medications, past family history, past medical history, past social history, past surgical history and problem list.    Review of Systems    Objective   Vitals:    08/29/23 1043   BP: 108/70   Pulse: 56   Temp: 97.5 øF (36.4 øC)   SpO2: 97%     Body mass index is 26.5 kg/mý.  Physical Exam  Vitals and nursing note reviewed.   Constitutional:       Appearance: Normal appearance. She is well-developed.   HENT:      Right Ear: Tympanic membrane and ear canal normal.      Left Ear: Tympanic membrane and ear canal normal.      Mouth/Throat:      Pharynx: Posterior oropharyngeal erythema present.   Cardiovascular:      Rate and Rhythm: Normal rate and regular rhythm.      Heart sounds: Normal heart sounds.   Pulmonary:      Effort: Pulmonary effort is normal.      Breath sounds: Normal breath sounds.   Lymphadenopathy:      Head:      Right side of head: Tonsillar adenopathy present.      Left side of head: Tonsillar adenopathy present.   Skin:     General: Skin is warm and dry.   Neurological:      Mental Status: She is alert and oriented to person, place, and time.   Psychiatric:         Speech: Speech normal.         Behavior: Behavior normal.         Thought Content: Thought content normal.     Results for orders placed or performed in visit on 08/29/23   POCT SARS-CoV-2 Antigen RUBEN    Specimen: Swab   Result Value Ref Range    SARS Antigen Not Detected Not Detected, Presumptive Negative    Influenza A Antigen RUBEN Not  Detected Not Detected    Influenza B Antigen RUBEN Not Detected Not Detected    Internal Control Passed Passed    Lot Number [id number]     Expiration Date 4,102,024    POCT rapid strep A    Specimen: Swab   Result Value Ref Range    Rapid Strep A Screen Negative Negative, VALID, INVALID, Not Performed    Internal Control Passed Passed    Lot Number [id number]     Expiration Date 10,292,024        Assessment & Plan   Diagnoses and all orders for this visit:    1. Acute pharyngitis, unspecified etiology (Primary)  -     azithromycin (Zithromax Z-Jimenez) 250 MG tablet; Take 2 tablets by mouth on day 1, then 1 tablet daily on days 2-5  Dispense: 6 tablet; Refill: 0    2. Sore throat  -     POCT SARS-CoV-2 Antigen RUBEN  -     POCT rapid strep A      Patient will be treated with Zithromax.  Follow-up if symptoms do not improve

## 2023-10-17 RX ORDER — ROSUVASTATIN CALCIUM 10 MG/1
TABLET, COATED ORAL
Qty: 90 TABLET | Refills: 1 | Status: SHIPPED | OUTPATIENT
Start: 2023-10-17

## 2023-10-25 DIAGNOSIS — Z78.0 POST-MENOPAUSAL: Primary | ICD-10-CM

## 2023-10-27 DIAGNOSIS — Z12.31 BREAST CANCER SCREENING BY MAMMOGRAM: Primary | ICD-10-CM

## 2023-11-29 ENCOUNTER — OFFICE VISIT (OUTPATIENT)
Dept: NEUROLOGY | Facility: CLINIC | Age: 83
End: 2023-11-29
Payer: MEDICARE

## 2023-11-29 VITALS
DIASTOLIC BLOOD PRESSURE: 76 MMHG | WEIGHT: 138 LBS | SYSTOLIC BLOOD PRESSURE: 112 MMHG | BODY MASS INDEX: 26.06 KG/M2 | HEIGHT: 61 IN

## 2023-11-29 DIAGNOSIS — Z86.73 HISTORY OF STROKE: Primary | ICD-10-CM

## 2023-11-29 NOTE — LETTER
December 13, 2023     Mariia Trujillo MD  [address]  [address]  [facility] [address]    Patient: Kristie Franz   YOB: 1940   Date of Visit: 11/29/2023     Dear Mariia Trujillo MD:       Thank you for referring Kristie Franz to me for evaluation. Below are the relevant portions of my assessment and plan of care.    If you have questions, please do not hesitate to call me. I look forward to following Kristie along with you.         Sincerely,        Jasmeet Ricks MD        CC: No Recipients    Jasmeet Ricks MD  12/13/23 9293  Sign when Signing Visit  Chief Complaint   Patient presents with   • Stroke       Patient ID: Kristie Franz is a 82 y.o. female.    HPI:  Interval history:  Ms. Kristie Franz is an 82-year-old female who presents to neurology clinic for follow-up of stroke. She had bilateral strokes, potentially small vessel in etiology. She had her LDL checked in 06/2023. It was normal, less than 70mg/dL at 65 mg/dL. Her last HgA1c was also normal in 2022. She has not had new stroke-like symptoms.    Today, the patient reports that she has not had any symptoms concerning for stroke over the last year. She confirms that she is still taking aspirin 81 mg.    The patient reports that she had cataract surgery and feels that her vision is still improving. She has an appointment with her eye doctor in 12/2023. The patient reads a lot, uses her phone, and uses her computer.    She has been experiencing pain in the back of her neck since recent travel by automobile. It is improving. Last night she applied gel to her neck, and it helped.    The patient goes to [employer] 2 days a week and 1 day a week she does yoga. She states that she has never been able to balance, and still has difficulty with it. She notes that she needs to be careful lifting things so that she does not hurt herself.    When given options for follow-up, the patient chose to return as needed.    The following portions of the  patient's history were reviewed and updated as appropriate: allergies, current medications, past family history, past medical history, past social history, past surgical history and problem list.      Review of Systems   Neurological:  Positive for headaches. Negative for dizziness, tremors, seizures, syncope, facial asymmetry, speech difficulty, weakness, light-headedness and numbness.   Psychiatric/Behavioral:  Negative for agitation, behavioral problems, confusion, decreased concentration, dysphoric mood, hallucinations, self-injury, sleep disturbance and suicidal ideas. The patient is not nervous/anxious and is not hyperactive.         Vitals:    23 1008   BP: 112/76       Neurologic Exam     Mental Status   Attention: normal. Concentration: normal.   Speech: speech is normal   Level of consciousness: alert    Cranial Nerves     CN II   Visual fields full to confrontation.   Visual acuity: normal    CN III, IV, VI   Pupils are equal, round, and reactive to light.  Extraocular motions are normal.     CN V   Facial sensation intact.     CN VII   Facial expression full, symmetric.     CN VIII   Hearing: intact    CN IX, X   Palate: symmetric    CN XI   Right trapezius strength: normal  Left trapezius strength: normal    CN XII   Tongue: not atrophic  Fasciculations: absent  Tongue deviation: none  Mild L eyelid ptosis     Motor Exam   Muscle bulk: normal    Strength   Right deltoid: 5/5  Left deltoid: 5/5  Right biceps: 5/5  Left biceps: 5/5  Right triceps: 5/5  Left triceps: 5/5  Right iliopsoas: 5/5  Left iliopsoas: 5/5  Right quadriceps: 5/5  Left quadriceps: 5/5  Right hamstrin/5  Left hamstrin/5  Right anterior tibial: 5/5  Left anterior tibial: 5/5  Right gastroc: 5/5  Left gastroc: 5/5Grip 5 out of 5 bilaterally     Sensory Exam   Right arm light touch: normal  Left arm light touch: normal  Right leg light touch: normal  Left leg light touch: normal    Gait, Coordination, and Reflexes      Coordination   Finger to nose coordination: normal  Heel to shin coordination: normal    Tremor   Action tremor: left armNormal unstressed gait. Left arm tremor with FNF testing       Physical Exam  Eyes:      Extraocular Movements: EOM normal.      Pupils: Pupils are equal, round, and reactive to light.   Neurological:      Coordination: Finger-Nose-Finger Test and Heel to Shin Test normal.   Psychiatric:         Speech: Speech normal.       Procedures    Assessment/Plan:         Diagnoses and all orders for this visit:    1. History of stroke (Primary)    PLAN  Over-the-counter medication was recommended. She should continue aspirin 81 mg. She has a chronic condition of history of stroke, which is stable.     Continue antiplatelet as prescribed.  HLD: Goal LDL <70, should continue surveillance labs and management with PCP  HTN: goal of asymptomatic normotension. If elevated pt should reach out to PCP office, and if remains elevated at home go to urgent care and/or emergency room  DM:  Continue monitoring and control of blood sugars per PCP and/or endocrinology  Secondary stroke prevention: Ideal targets for stroke prevention would be Blood pressure < 130/80; LDL < 70; HbA1c < 6.5 and smoking cessation if applicable.  Call 911 for stroke symptoms      MDM level 3.       Transcribed from ambient dictation for Jasmeet Ricks MD by Berkley Ball.  11/29/23   12:25 EST    Patient or patient representative verbalized consent to the visit recording.  I have personally performed the services described in this document as transcribed by the above individual, and it is both accurate and complete.  Jasmeet Ricks MD  12/13/2023  16:39 EST

## 2023-11-29 NOTE — PROGRESS NOTES
Chief Complaint   Patient presents with    Stroke       Patient ID: Kristie Franz is a 82 y.o. female.    HPI:  Interval history:  Ms. Kristie Franz is an 82-year-old female who presents to neurology clinic for follow-up of stroke. She had bilateral strokes, potentially small vessel in etiology. She had her LDL checked in 06/2023. It was normal, less than 70mg/dL at 65 mg/dL. Her last HgA1c was also normal in 2022. She has not had new stroke-like symptoms.    Today, the patient reports that she has not had any symptoms concerning for stroke over the last year. She confirms that she is still taking aspirin 81 mg.    The patient reports that she had cataract surgery and feels that her vision is still improving. She has an appointment with her eye doctor in 12/2023. The patient reads a lot, uses her phone, and uses her computer.    She has been experiencing pain in the back of her neck since recent travel by automobile. It is improving. Last night she applied gel to her neck, and it helped.    The patient goes to [employer] 2 days a week and 1 day a week she does yoga. She states that she has never been able to balance, and still has difficulty with it. She notes that she needs to be careful lifting things so that she does not hurt herself.    When given options for follow-up, the patient chose to return as needed.    The following portions of the patient's history were reviewed and updated as appropriate: allergies, current medications, past family history, past medical history, past social history, past surgical history and problem list.      Review of Systems   Neurological:  Positive for headaches. Negative for dizziness, tremors, seizures, syncope, facial asymmetry, speech difficulty, weakness, light-headedness and numbness.   Psychiatric/Behavioral:  Negative for agitation, behavioral problems, confusion, decreased concentration, dysphoric mood, hallucinations, self-injury, sleep disturbance and suicidal  ideas. The patient is not nervous/anxious and is not hyperactive.         Vitals:    23 1008   BP: 112/76       Neurologic Exam     Mental Status   Attention: normal. Concentration: normal.   Speech: speech is normal   Level of consciousness: alert    Cranial Nerves     CN II   Visual fields full to confrontation.   Visual acuity: normal    CN III, IV, VI   Pupils are equal, round, and reactive to light.  Extraocular motions are normal.     CN V   Facial sensation intact.     CN VII   Facial expression full, symmetric.     CN VIII   Hearing: intact    CN IX, X   Palate: symmetric    CN XI   Right trapezius strength: normal  Left trapezius strength: normal    CN XII   Tongue: not atrophic  Fasciculations: absent  Tongue deviation: none  Mild L eyelid ptosis     Motor Exam   Muscle bulk: normal    Strength   Right deltoid: 5/5  Left deltoid: 5/5  Right biceps: 5/5  Left biceps: 5/5  Right triceps: 5/5  Left triceps: 5/5  Right iliopsoas: 5/5  Left iliopsoas: 5/5  Right quadriceps: 5/5  Left quadriceps: 5/5  Right hamstrin/5  Left hamstrin/5  Right anterior tibial: 5/5  Left anterior tibial: 5/5  Right gastroc: 5/5  Left gastroc: 5/5Grip 5 out of 5 bilaterally     Sensory Exam   Right arm light touch: normal  Left arm light touch: normal  Right leg light touch: normal  Left leg light touch: normal    Gait, Coordination, and Reflexes     Coordination   Finger to nose coordination: normal  Heel to shin coordination: normal    Tremor   Action tremor: left armNormal unstressed gait. Left arm tremor with FNF testing       Physical Exam  Eyes:      Extraocular Movements: EOM normal.      Pupils: Pupils are equal, round, and reactive to light.   Neurological:      Coordination: Finger-Nose-Finger Test and Heel to Shin Test normal.   Psychiatric:         Speech: Speech normal.       Procedures    Assessment/Plan:         Diagnoses and all orders for this visit:    1. History of stroke  (Primary)    PLAN  Over-the-counter medication was recommended. She should continue aspirin 81 mg. She has a chronic condition of history of stroke, which is stable.     Continue antiplatelet as prescribed.  HLD: Goal LDL <70, should continue surveillance labs and management with PCP  HTN: goal of asymptomatic normotension. If elevated pt should reach out to PCP office, and if remains elevated at home go to urgent care and/or emergency room  DM:  Continue monitoring and control of blood sugars per PCP and/or endocrinology  Secondary stroke prevention: Ideal targets for stroke prevention would be Blood pressure < 130/80; LDL < 70; HbA1c < 6.5 and smoking cessation if applicable.  Call 911 for stroke symptoms      MDM level 3.       Transcribed from ambient dictation for Jasmeet Ricks MD by Berkley Ball.  11/29/23   12:25 EST    Patient or patient representative verbalized consent to the visit recording.  I have personally performed the services described in this document as transcribed by the above individual, and it is both accurate and complete.  Jasmeet Ricks MD  12/13/2023  16:39 EST

## 2023-12-05 DIAGNOSIS — E78.49 OTHER HYPERLIPIDEMIA: Primary | ICD-10-CM

## 2023-12-05 DIAGNOSIS — E03.9 ACQUIRED HYPOTHYROIDISM: ICD-10-CM

## 2023-12-05 DIAGNOSIS — I10 PRIMARY HYPERTENSION: ICD-10-CM

## 2023-12-07 LAB
ALBUMIN SERPL-MCNC: 4.1 G/DL (ref 3.5–5.2)
ALBUMIN/GLOB SERPL: 2 G/DL
ALP SERPL-CCNC: 70 U/L (ref 39–117)
ALT SERPL-CCNC: 15 U/L (ref 1–33)
AST SERPL-CCNC: 20 U/L (ref 1–32)
BILIRUB SERPL-MCNC: 0.7 MG/DL (ref 0–1.2)
BUN SERPL-MCNC: 19 MG/DL (ref 8–23)
BUN/CREAT SERPL: 20.7 (ref 7–25)
CALCIUM SERPL-MCNC: 9.6 MG/DL (ref 8.6–10.5)
CHLORIDE SERPL-SCNC: 104 MMOL/L (ref 98–107)
CHOLEST SERPL-MCNC: 161 MG/DL (ref 0–200)
CO2 SERPL-SCNC: 25.2 MMOL/L (ref 22–29)
CREAT SERPL-MCNC: 0.92 MG/DL (ref 0.57–1)
EGFRCR SERPLBLD CKD-EPI 2021: 62.3 ML/MIN/1.73
ERYTHROCYTE [DISTWIDTH] IN BLOOD BY AUTOMATED COUNT: 12.3 % (ref 12.3–15.4)
GLOBULIN SER CALC-MCNC: 2.1 GM/DL
GLUCOSE SERPL-MCNC: 93 MG/DL (ref 65–99)
HCT VFR BLD AUTO: 38.4 % (ref 34–46.6)
HDLC SERPL-MCNC: 78 MG/DL (ref 40–60)
HGB BLD-MCNC: 12.8 G/DL (ref 12–15.9)
LDLC SERPL CALC-MCNC: 71 MG/DL (ref 0–100)
LDLC/HDLC SERPL: 0.91 {RATIO}
MCH RBC QN AUTO: 31.3 PG (ref 26.6–33)
MCHC RBC AUTO-ENTMCNC: 33.3 G/DL (ref 31.5–35.7)
MCV RBC AUTO: 93.9 FL (ref 79–97)
PLATELET # BLD AUTO: 227 10*3/MM3 (ref 140–450)
POTASSIUM SERPL-SCNC: 3.9 MMOL/L (ref 3.5–5.2)
PROT SERPL-MCNC: 6.2 G/DL (ref 6–8.5)
RBC # BLD AUTO: 4.09 10*6/MM3 (ref 3.77–5.28)
SODIUM SERPL-SCNC: 142 MMOL/L (ref 136–145)
TRIGL SERPL-MCNC: 61 MG/DL (ref 0–150)
TSH SERPL DL<=0.005 MIU/L-ACNC: 2.21 UIU/ML (ref 0.45–4.5)
VLDLC SERPL CALC-MCNC: 12 MG/DL (ref 5–40)
WBC # BLD AUTO: 3.95 10*3/MM3 (ref 3.4–10.8)

## 2023-12-12 ENCOUNTER — OFFICE VISIT (OUTPATIENT)
Dept: INTERNAL MEDICINE | Facility: CLINIC | Age: 83
End: 2023-12-12
Payer: MEDICARE

## 2023-12-12 VITALS
DIASTOLIC BLOOD PRESSURE: 60 MMHG | SYSTOLIC BLOOD PRESSURE: 110 MMHG | HEART RATE: 53 BPM | OXYGEN SATURATION: 96 % | TEMPERATURE: 98.2 F | HEIGHT: 61 IN | BODY MASS INDEX: 25.3 KG/M2 | WEIGHT: 134 LBS

## 2023-12-12 DIAGNOSIS — E78.49 OTHER HYPERLIPIDEMIA: ICD-10-CM

## 2023-12-12 DIAGNOSIS — E03.9 ACQUIRED HYPOTHYROIDISM: ICD-10-CM

## 2023-12-12 DIAGNOSIS — Z00.00 MEDICARE ANNUAL WELLNESS VISIT, SUBSEQUENT: Primary | ICD-10-CM

## 2023-12-12 DIAGNOSIS — F41.8 SITUATIONAL ANXIETY: ICD-10-CM

## 2023-12-12 DIAGNOSIS — I10 PRIMARY HYPERTENSION: ICD-10-CM

## 2023-12-12 RX ORDER — LEVOTHYROXINE SODIUM 0.05 MG/1
50 TABLET ORAL DAILY
Qty: 90 TABLET | Refills: 3 | Status: SHIPPED | OUTPATIENT
Start: 2023-12-12

## 2023-12-12 RX ORDER — HYDROCHLOROTHIAZIDE 25 MG/1
25 TABLET ORAL DAILY
Qty: 90 TABLET | Refills: 1 | Status: SHIPPED | OUTPATIENT
Start: 2023-12-12

## 2023-12-12 RX ORDER — CITALOPRAM HYDROBROMIDE 10 MG/1
10 TABLET ORAL DAILY
Qty: 90 TABLET | Refills: 1 | Status: SHIPPED | OUTPATIENT
Start: 2023-12-12

## 2023-12-12 RX ORDER — ATENOLOL 25 MG/1
25 TABLET ORAL
Qty: 90 TABLET | Refills: 1 | Status: SHIPPED | OUTPATIENT
Start: 2023-12-12

## 2023-12-12 NOTE — PATIENT INSTRUCTIONS
Medicare Wellness  Personal Prevention Plan of Service     Date of Office Visit:    Encounter Provider:  Mariia Trujillo MD  Place of Service:  [facility] INTER[facility]  Patient Name: Kristie Franz  :  1940    As part of the Medicare Wellness portion of your visit today, we are providing you with this personalized preventive plan of services (PPPS). This plan is based upon recommendations of the [place] Preventive Services Task Force (USPSTF) and the Advisory Committee on Immunization Practices (ACIP).    This lists the preventive care services that should be considered, and provides dates of when you are due. Items listed as completed are up-to-date and do not require any further intervention.    Health Maintenance   Topic Date Due    DXA SCAN  2023    LIPID PANEL  2024    ANNUAL WELLNESS VISIT  2024    BMI FOLLOWUP  2024    COLORECTAL CANCER SCREENING  2025    TDAP/TD VACCINES (2 - Td or Tdap) 10/04/2025    COVID-19 Vaccine  Completed    INFLUENZA VACCINE  Completed    Pneumococcal Vaccine 65+  Completed    ZOSTER VACCINE  Completed       No orders of the defined types were placed in this encounter.      Return in about 6 months (around 2024).

## 2023-12-12 NOTE — PROGRESS NOTES
Subjective   Kristie Franz is a 82 y.o. female.   Chief Complaint   Patient presents with    Hypertension    Hyperlipidemia    Anxiety    Hypothyroidism       History of Present Illness     1.Hypertension-  patient is on HCTZ and 25 mg a day, off  losartan 25 mg a day ( DC d by dr Call due to low BP) and atenolol at 25 mg a day.  She takes it every day. She has no side effects.  She has no chest pain, no shortness of breath, no lightheadedness,no palpitations.  Creatinine 0.92, GFR 62.3.  She exercises 3 times a week.  Twice with Silver Sneakers and once a week she goes to yoga class.  She follows up with cardiologist.     2. Situational anxiety- on citalopram at 10 mg a day.  She takes it every day.  Mood is normal.  No worries.  No side effects. No suicidal ideations or aggressive behaviors. PHQ-9 at 1, BISHNU-7 is 0 71 from.     3. Hyperlipidemia- on Crestor 10 mg a day.  She takes it every day.  She has no side effects.  No muscle aches.  LDL 71 from 65, HDL 71.  LFTs normal.    4. Hypothyroidism-she has no history of thyroid surgery.  She is on levothyroxine 50 mcg a day.  She takes it every day on empty stomach and does not eat for at least 30 minutes after taking it.  TSH is at 2.2.  She lost 6 pounds from August.  She did it by limiting amount of sweets and regular exercise.  She feels good.    Review of Systems   Respiratory:  Negative for shortness of breath.    Cardiovascular:  Negative for chest pain and palpitations.   Musculoskeletal:  Negative for myalgias.   Neurological:  Negative for light-headedness.   Psychiatric/Behavioral:  Negative for suicidal ideas. The patient is not nervous/anxious.          Objective   Wt Readings from Last 3 Encounters:   12/12/23 60.8 kg (134 lb)   11/29/23 62.6 kg (138 lb)   08/29/23 63.6 kg (140 lb 3.2 oz)      Vitals:    12/12/23 1122   BP: 110/60   Pulse: 53   Temp: 98.2 °F (36.8 °C)   SpO2: 96%     Temp Readings from Last 3 Encounters:   12/12/23 98.2 °F (36.8 °C)    08/29/23 97.5 °F (36.4 °C)   08/05/23 98.7 °F (37.1 °C)     BP Readings from Last 3 Encounters:   12/12/23 110/60   11/29/23 112/76   08/29/23 108/70     Pulse Readings from Last 3 Encounters:   12/12/23 53   08/29/23 56   08/05/23 57     Body mass index is 25.33 kg/m².    Physical Exam  Constitutional:       Appearance: She is well-developed.   Neck:      Thyroid: No thyromegaly.      Vascular: No carotid bruit.   Cardiovascular:      Rate and Rhythm: Normal rate and regular rhythm.      Heart sounds: Normal heart sounds.   Pulmonary:      Effort: Pulmonary effort is normal.      Breath sounds: Normal breath sounds.   Skin:     General: Skin is warm and dry.   Neurological:      Mental Status: She is alert and oriented to person, place, and time.   Psychiatric:         Behavior: Behavior normal.         Assessment & Plan   Diagnoses and all orders for this visit:    1. Medicare annual wellness visit, subsequent (Primary)    2. Primary hypertension    3. Other hyperlipidemia    4. Situational anxiety    5. Acquired hypothyroidism    Other orders  -     citalopram (CeleXA) 10 MG tablet; Take 1 tablet by mouth Daily.  Dispense: 90 tablet; Refill: 1  -     hydroCHLOROthiazide (HYDRODIURIL) 25 MG tablet; Take 1 tablet by mouth Daily.  Dispense: 90 tablet; Refill: 1  -     atenolol (TENORMIN) 25 MG tablet; Take 1 tablet by mouth Daily Before Supper.  Dispense: 90 tablet; Refill: 1  -     levothyroxine (SYNTHROID, LEVOTHROID) 50 MCG tablet; Take 1 tablet by mouth Daily.  Dispense: 90 tablet; Refill: 3        Hypertension-continue current treatment.  Follow-up in 6 months.    Hyperlipidemia-continue current treatment.  Follow-up in 6 months.    Anxiety-continue current treatment.  Follow-up in 6 months.    Hypothyroidism-continue current treatment.  Follow-up in 12 months.
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
## 2023-12-15 ENCOUNTER — HOSPITAL ENCOUNTER (OUTPATIENT)
Dept: MAMMOGRAPHY | Facility: HOSPITAL | Age: 83
Discharge: HOME | End: 2023-12-15
Payer: MEDICARE

## 2023-12-15 ENCOUNTER — HOSPITAL ENCOUNTER (OUTPATIENT)
Dept: BONE DENSITY | Facility: HOSPITAL | Age: 83
Discharge: HOME | End: 2023-12-15
Payer: MEDICARE

## 2023-12-15 DIAGNOSIS — Z12.31 BREAST CANCER SCREENING BY MAMMOGRAM: ICD-10-CM

## 2023-12-15 PROCEDURE — 77080 DXA BONE DENSITY AXIAL: CPT

## 2023-12-15 PROCEDURE — 77067 SCR MAMMO BI INCL CAD: CPT

## 2023-12-15 PROCEDURE — 77063 BREAST TOMOSYNTHESIS BI: CPT

## 2023-12-18 DIAGNOSIS — Z12.31 ENCOUNTER FOR SCREENING MAMMOGRAM FOR MALIGNANT NEOPLASM OF BREAST: ICD-10-CM

## 2023-12-18 DIAGNOSIS — R92.2 DENSE BREAST: Primary | ICD-10-CM

## 2023-12-18 DIAGNOSIS — R92.30 DENSE BREAST: Primary | ICD-10-CM

## 2024-01-09 ENCOUNTER — OFFICE VISIT (OUTPATIENT)
Dept: INTERNAL MEDICINE | Facility: CLINIC | Age: 84
End: 2024-01-09
Payer: MEDICARE

## 2024-01-09 VITALS
HEIGHT: 61 IN | TEMPERATURE: 97.1 F | BODY MASS INDEX: 25.11 KG/M2 | HEART RATE: 71 BPM | SYSTOLIC BLOOD PRESSURE: 120 MMHG | WEIGHT: 133 LBS | DIASTOLIC BLOOD PRESSURE: 70 MMHG | OXYGEN SATURATION: 95 %

## 2024-01-09 DIAGNOSIS — E78.2 MIXED HYPERLIPIDEMIA: ICD-10-CM

## 2024-01-09 DIAGNOSIS — M81.0 AGE-RELATED OSTEOPOROSIS WITHOUT CURRENT PATHOLOGICAL FRACTURE: Primary | ICD-10-CM

## 2024-01-09 PROCEDURE — 99214 OFFICE O/P EST MOD 30 MIN: CPT | Performed by: FAMILY MEDICINE

## 2024-01-09 PROCEDURE — 3074F SYST BP LT 130 MM HG: CPT | Performed by: FAMILY MEDICINE

## 2024-01-09 PROCEDURE — 3078F DIAST BP <80 MM HG: CPT | Performed by: FAMILY MEDICINE

## 2024-01-09 RX ORDER — ALENDRONATE SODIUM 70 MG/1
70 TABLET ORAL
Qty: 12 TABLET | Refills: 3 | Status: SHIPPED | OUTPATIENT
Start: 2024-01-09

## 2024-01-09 NOTE — PROGRESS NOTES
Subjective   Kristie Franz is a 83 y.o. female.   Chief Complaint   Patient presents with    Osteoporosis       History of Present Illness     Osteoporosis-patient had bone density done in December 2023.  It was positive for osteoporosis in both hips.  She does not take vitamin D.  She likes milk and drinks a lot of it.  No recent falls or problems with balance.    No abdominal pain, no nausea.  No history of stomach ulcers.      Review of Systems   Gastrointestinal:  Negative for abdominal pain and nausea.   Neurological: Negative.          Objective   Wt Readings from Last 3 Encounters:   01/09/24 60.3 kg (133 lb)   12/12/23 60.8 kg (134 lb)   11/29/23 62.6 kg (138 lb)      Vitals:    01/09/24 0846   BP: 120/70   Pulse: 71   Temp: 97.1 °F (36.2 °C)   SpO2: 95%     Temp Readings from Last 3 Encounters:   01/09/24 97.1 °F (36.2 °C)   12/12/23 98.2 °F (36.8 °C)   08/29/23 97.5 °F (36.4 °C)     BP Readings from Last 3 Encounters:   01/09/24 120/70   12/12/23 110/60   11/29/23 112/76     Pulse Readings from Last 3 Encounters:   01/09/24 71   12/12/23 53   08/29/23 56     Body mass index is 25.14 kg/m².    Physical Exam  Constitutional:       Appearance: Normal appearance.   Cardiovascular:      Rate and Rhythm: Normal rate and regular rhythm.   Pulmonary:      Effort: Pulmonary effort is normal.      Breath sounds: Normal breath sounds.   Neurological:      Mental Status: She is alert.      Gait: Gait normal.   Psychiatric:         Mood and Affect: Mood normal.         Behavior: Behavior normal.         Assessment & Plan   Diagnoses and all orders for this visit:    1. Age-related osteoporosis without current pathological fracture (Primary)  -     Vitamin D,25-Hydroxy; Future    2. Mixed hyperlipidemia  -     Comprehensive Metabolic Panel; Future  -     Lipid Panel With LDL / HDL Ratio; Future    Other orders  -     alendronate (Fosamax) 70 MG tablet; Take 1 tablet by mouth Every 7 (Seven) Days.  Dispense: 12 tablet;  Refill: 3        Osteoporosis-new diagnosis.  We discussed indication for prescription medications including biphosphonates/Prolia.  Will start alendronate 70 mg a week.  Side effects including GI symptoms and rare side effect-jaw necrosis discussed.  Patient will notify her dentist about starting alendronate.  She will take alendronate on empty stomach with a full glass of water.  She will stay upright after taking it.  She is advised to start vitamin D at 1000 units a day.  She should get 1200 mg of calcium a day, preferably from diet.  I recommend weightbearing exercise and yoga/annemarie chi.  Fall prevention discussed and handouts added to discharge summary.  Handouts on osteoporosis added to discharge summary.  Follow-up in 6 to 12 months.

## 2024-02-08 ENCOUNTER — TELEPHONE (OUTPATIENT)
Dept: ORTHOPEDIC SURGERY | Facility: CLINIC | Age: 84
End: 2024-02-08

## 2024-02-08 NOTE — TELEPHONE ENCOUNTER
Caller: Kristie Franz    Relationship to patient: Self    Best call back number: [phone]    Chief complaint: LEFT KNEE    Type of visit: GEL INJECTION    Requested date:  ASAP, TUESDAYS WORK BEST

## 2024-02-12 RX ORDER — LEVOTHYROXINE SODIUM 0.05 MG/1
50 TABLET ORAL DAILY
Qty: 90 TABLET | Refills: 3 | Status: SHIPPED | OUTPATIENT
Start: 2024-02-12

## 2024-02-22 ENCOUNTER — OFFICE VISIT (OUTPATIENT)
Dept: ORTHOPEDIC SURGERY | Facility: CLINIC | Age: 84
End: 2024-02-22
Payer: MEDICARE

## 2024-02-22 VITALS — WEIGHT: 136.1 LBS | BODY MASS INDEX: 25.69 KG/M2 | HEIGHT: 61 IN | TEMPERATURE: 97.7 F

## 2024-02-22 DIAGNOSIS — M17.12 PRIMARY OSTEOARTHRITIS OF LEFT KNEE: Primary | ICD-10-CM

## 2024-02-22 RX ORDER — LIDOCAINE HYDROCHLORIDE 10 MG/ML
3 INJECTION, SOLUTION EPIDURAL; INFILTRATION; INTRACAUDAL; PERINEURAL
Status: COMPLETED | OUTPATIENT
Start: 2024-02-22 | End: 2024-02-22

## 2024-02-22 RX ADMIN — LIDOCAINE HYDROCHLORIDE 3 ML: 10 INJECTION, SOLUTION EPIDURAL; INFILTRATION; INTRACAUDAL; PERINEURAL at 17:59

## 2024-02-22 NOTE — PROGRESS NOTES
2/22/2024    Kristie Franz is here today for worsening knee pain. Pt has undergone injection of the knee in the past with good resolution of symptoms. Pt is requesting a repeat injection.     KNEE Injection Procedure Note:    Large Joint Arthrocentesis: L knee  Date/Time: 2/22/2024 5:59 PM  Consent given by: patient  Site marked: site marked  Timeout: Immediately prior to procedure a time out was called to verify the correct patient, procedure, equipment, support staff and site/side marked as required   Supporting Documentation  Indications: pain and joint swelling   Procedure Details  Location: knee - L knee  Preparation: Patient was prepped and draped in the usual sterile fashion  Needle size: 22 G  Approach: anterolateral  Medications administered: 48 mg hylan 48 MG/6ML; 3 mL lidocaine PF 1% 1 %  Patient tolerance: patient tolerated the procedure well with no immediate complications      (3 mL of lidocaine was used for anesthetic purposes)    At the conclusion of the injection I discussed the importance of continued quad strengthening exercises on a daily basis. I will see the patient back if the symptoms should fail to improve or worsen.    Lam Staley, APRN  2/22/2024

## 2024-04-18 RX ORDER — ROSUVASTATIN CALCIUM 10 MG/1
10 TABLET, COATED ORAL DAILY
Qty: 90 TABLET | Refills: 1 | Status: SHIPPED | OUTPATIENT
Start: 2024-04-18

## 2024-06-10 DIAGNOSIS — E78.2 MIXED HYPERLIPIDEMIA: ICD-10-CM

## 2024-06-10 DIAGNOSIS — M81.0 AGE-RELATED OSTEOPOROSIS WITHOUT CURRENT PATHOLOGICAL FRACTURE: ICD-10-CM

## 2024-06-11 LAB
25(OH)D3+25(OH)D2 SERPL-MCNC: 32.5 NG/ML (ref 30–100)
ALBUMIN SERPL-MCNC: 4 G/DL (ref 3.5–5.2)
ALBUMIN/GLOB SERPL: 1.7 G/DL
ALP SERPL-CCNC: 66 U/L (ref 39–117)
ALT SERPL-CCNC: 11 U/L (ref 1–33)
AST SERPL-CCNC: 18 U/L (ref 1–32)
BILIRUB SERPL-MCNC: 0.7 MG/DL (ref 0–1.2)
BUN SERPL-MCNC: 22 MG/DL (ref 8–23)
BUN/CREAT SERPL: 26.8 (ref 7–25)
CALCIUM SERPL-MCNC: 9.5 MG/DL (ref 8.6–10.5)
CHLORIDE SERPL-SCNC: 102 MMOL/L (ref 98–107)
CHOLEST SERPL-MCNC: 168 MG/DL (ref 0–200)
CO2 SERPL-SCNC: 30.7 MMOL/L (ref 22–29)
CREAT SERPL-MCNC: 0.82 MG/DL (ref 0.57–1)
EGFRCR SERPLBLD CKD-EPI 2021: 71.1 ML/MIN/1.73
GLOBULIN SER CALC-MCNC: 2.4 GM/DL
GLUCOSE SERPL-MCNC: 96 MG/DL (ref 65–99)
HDLC SERPL-MCNC: 86 MG/DL (ref 40–60)
LDLC SERPL CALC-MCNC: 69 MG/DL (ref 0–100)
LDLC/HDLC SERPL: 0.8 {RATIO}
POTASSIUM SERPL-SCNC: 3.9 MMOL/L (ref 3.5–5.2)
PROT SERPL-MCNC: 6.4 G/DL (ref 6–8.5)
SODIUM SERPL-SCNC: 142 MMOL/L (ref 136–145)
TRIGL SERPL-MCNC: 68 MG/DL (ref 0–150)
VLDLC SERPL CALC-MCNC: 13 MG/DL (ref 5–40)

## 2024-06-18 ENCOUNTER — OFFICE VISIT (OUTPATIENT)
Dept: INTERNAL MEDICINE | Facility: CLINIC | Age: 84
End: 2024-06-18
Payer: MEDICARE

## 2024-06-18 VITALS
BODY MASS INDEX: 25.68 KG/M2 | TEMPERATURE: 97.5 F | HEIGHT: 61 IN | OXYGEN SATURATION: 95 % | SYSTOLIC BLOOD PRESSURE: 114 MMHG | DIASTOLIC BLOOD PRESSURE: 60 MMHG | WEIGHT: 136 LBS | HEART RATE: 66 BPM

## 2024-06-18 DIAGNOSIS — E78.2 MIXED HYPERLIPIDEMIA: ICD-10-CM

## 2024-06-18 DIAGNOSIS — M81.0 AGE-RELATED OSTEOPOROSIS WITHOUT CURRENT PATHOLOGICAL FRACTURE: ICD-10-CM

## 2024-06-18 DIAGNOSIS — F41.8 SITUATIONAL ANXIETY: ICD-10-CM

## 2024-06-18 DIAGNOSIS — I10 PRIMARY HYPERTENSION: Primary | ICD-10-CM

## 2024-06-18 DIAGNOSIS — N18.9 CHRONIC KIDNEY DISEASE, UNSPECIFIED CKD STAGE: Primary | ICD-10-CM

## 2024-06-18 PROCEDURE — 1160F RVW MEDS BY RX/DR IN RCRD: CPT | Performed by: FAMILY MEDICINE

## 2024-06-18 PROCEDURE — 3078F DIAST BP <80 MM HG: CPT | Performed by: FAMILY MEDICINE

## 2024-06-18 PROCEDURE — 1125F AMNT PAIN NOTED PAIN PRSNT: CPT | Performed by: FAMILY MEDICINE

## 2024-06-18 PROCEDURE — 1159F MED LIST DOCD IN RCRD: CPT | Performed by: FAMILY MEDICINE

## 2024-06-18 PROCEDURE — 99214 OFFICE O/P EST MOD 30 MIN: CPT | Performed by: FAMILY MEDICINE

## 2024-06-18 PROCEDURE — 3074F SYST BP LT 130 MM HG: CPT | Performed by: FAMILY MEDICINE

## 2024-06-18 RX ORDER — CITALOPRAM HYDROBROMIDE 10 MG/1
10 TABLET ORAL DAILY
COMMUNITY
End: 2024-06-18 | Stop reason: SDUPTHER

## 2024-06-18 RX ORDER — CITALOPRAM HYDROBROMIDE 10 MG/1
10 TABLET ORAL DAILY
Qty: 90 TABLET | Refills: 1 | Status: SHIPPED | OUTPATIENT
Start: 2024-06-18

## 2024-06-18 RX ORDER — HYDROCHLOROTHIAZIDE 25 MG/1
25 TABLET ORAL DAILY
Qty: 90 TABLET | Refills: 1 | Status: SHIPPED | OUTPATIENT
Start: 2024-06-18

## 2024-06-18 RX ORDER — ATENOLOL 25 MG/1
25 TABLET ORAL
Qty: 90 TABLET | Refills: 1 | Status: SHIPPED | OUTPATIENT
Start: 2024-06-18

## 2024-06-18 NOTE — PROGRESS NOTES
Subjective   Kristie Franz is a 83 y.o. female.   Chief Complaint   Patient presents with    Hyperlipidemia    Osteoporosis       History of Present Illness     1.Hypertension-  patient is on HCTZ and 25 mg a day and atenolol at 25 mg a day.  She takes it every day. She has no side effects.  She has no chest pain, no shortness of breath, no lightheadedness.  Creatinine 0.82, GFR 71.1.  She follows up with cardiologist yearly.     2. Situational anxiety-in December we weaned off citalopram. Her mood was getting worse and anxiety was gradually worse.  She was sad.  She cried a lot.  She restarted citalopram in April 2024.  She is on 10 mg a day.  She takes it every day.  Mood is normal.  No worries.  No side effects. No suicidal ideations or aggressive behaviors. PHQ-9 at 1, BISHNU-7 at 0.     3. Hyperlipidemia- on rosuvastatin 10 mg a day.  She takes it every day.  She has no side effects.  No muscle aches.  LDL 69 from 71 from 65, HDL 86.  LFTs normal.    4. Osteoporosis-patient had bone density done in December 2023. It was positive for osteoporosis in both hips.  We saw patient in January 2024 and started her on alendronate.  She takes it as prescribed.  She takes it on empty stomach with a full glass of water.  She stays upright after taking it.  She has no side effects.  She notified her dentist about starting it.  She also started vitamin D3 at 1000 units a day.  Vitamin D level is 32.5.    Review of Systems   Respiratory:  Negative for shortness of breath.    Cardiovascular:  Negative for chest pain and palpitations.   Musculoskeletal:  Negative for myalgias.   Neurological:  Negative for light-headedness.         Objective   Wt Readings from Last 3 Encounters:   06/18/24 61.7 kg (136 lb)   02/22/24 61.7 kg (136 lb 1.6 oz)   01/09/24 60.3 kg (133 lb)      Vitals:    06/18/24 1028   BP: 114/60   Pulse: 66   Temp: 97.5 °F (36.4 °C)   SpO2: 95%     Temp Readings from Last 3 Encounters:   06/18/24 97.5 °F (36.4 °C)    02/22/24 97.7 °F (36.5 °C) (Temporal)   01/09/24 97.1 °F (36.2 °C)     BP Readings from Last 3 Encounters:   06/18/24 114/60   01/09/24 120/70   12/12/23 110/60     Pulse Readings from Last 3 Encounters:   06/18/24 66   01/09/24 71   12/12/23 53     Body mass index is 25.71 kg/m².    Physical Exam  Constitutional:       Appearance: She is well-developed.   Neck:      Vascular: No carotid bruit.   Cardiovascular:      Rate and Rhythm: Normal rate and regular rhythm.      Heart sounds: Normal heart sounds.   Pulmonary:      Effort: Pulmonary effort is normal.      Breath sounds: Normal breath sounds.   Skin:     General: Skin is warm and dry.   Neurological:      Mental Status: She is alert.         Assessment & Plan   Diagnoses and all orders for this visit:    1. Primary hypertension (Primary)    2. Mixed hyperlipidemia    3. Situational anxiety    4. Age-related osteoporosis without current pathological fracture    Other orders  -     citalopram (CeleXA) 10 MG tablet; Take 1 tablet by mouth Daily.  Dispense: 90 tablet; Refill: 1  -     hydroCHLOROthiazide 25 MG tablet; Take 1 tablet by mouth Daily.  Dispense: 90 tablet; Refill: 1  -     atenolol (TENORMIN) 25 MG tablet; Take 1 tablet by mouth Daily Before Supper.  Dispense: 90 tablet; Refill: 1        Hypertension-continue current treatment.  Follow-up in 6 months.    Hyperlipidemia-continue current treatment.  Follow-up in 6 months.    BISHNU-continue current treatment.  Follow-up in 6 months.    Osteoporosis-continue current treatment.  Fall prevention discussed.  Follow-up in 12 months.

## 2024-07-19 ENCOUNTER — OFFICE VISIT (OUTPATIENT)
Age: 84
End: 2024-07-19
Payer: MEDICARE

## 2024-07-19 DIAGNOSIS — I10 PRIMARY HYPERTENSION: ICD-10-CM

## 2024-07-19 DIAGNOSIS — E78.49 OTHER HYPERLIPIDEMIA: ICD-10-CM

## 2024-07-19 DIAGNOSIS — I47.19 ATRIAL TACHYCARDIA: Primary | ICD-10-CM

## 2024-07-19 PROCEDURE — 3078F DIAST BP <80 MM HG: CPT | Performed by: PHYSICIAN ASSISTANT

## 2024-07-19 PROCEDURE — 99214 OFFICE O/P EST MOD 30 MIN: CPT | Performed by: PHYSICIAN ASSISTANT

## 2024-07-19 PROCEDURE — 3074F SYST BP LT 130 MM HG: CPT | Performed by: PHYSICIAN ASSISTANT

## 2024-07-22 VITALS
DIASTOLIC BLOOD PRESSURE: 68 MMHG | SYSTOLIC BLOOD PRESSURE: 104 MMHG | WEIGHT: 135 LBS | HEART RATE: 53 BPM | BODY MASS INDEX: 25.52 KG/M2

## 2024-07-22 PROCEDURE — 93000 ELECTROCARDIOGRAM COMPLETE: CPT | Performed by: PHYSICIAN ASSISTANT
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
## 2024-08-29 ENCOUNTER — TELEPHONE (OUTPATIENT)
Dept: ORTHOPEDIC SURGERY | Facility: CLINIC | Age: 84
End: 2024-08-29

## 2024-09-12 ENCOUNTER — CLINICAL SUPPORT (OUTPATIENT)
Dept: ORTHOPEDIC SURGERY | Facility: CLINIC | Age: 84
End: 2024-09-12
Payer: MEDICARE

## 2024-09-12 VITALS — WEIGHT: 137.6 LBS | HEIGHT: 61 IN | TEMPERATURE: 97.9 F | BODY MASS INDEX: 25.98 KG/M2

## 2024-09-12 DIAGNOSIS — R52 PAIN: Primary | ICD-10-CM

## 2024-09-12 DIAGNOSIS — M17.12 PRIMARY OSTEOARTHRITIS OF LEFT KNEE: ICD-10-CM

## 2024-09-12 RX ORDER — LIDOCAINE HYDROCHLORIDE 10 MG/ML
3 INJECTION, SOLUTION EPIDURAL; INFILTRATION; INTRACAUDAL; PERINEURAL
Status: COMPLETED | OUTPATIENT
Start: 2024-09-12 | End: 2024-09-12

## 2024-09-12 RX ADMIN — LIDOCAINE HYDROCHLORIDE 3 ML: 10 INJECTION, SOLUTION EPIDURAL; INFILTRATION; INTRACAUDAL; PERINEURAL at 15:16

## 2024-09-12 NOTE — PROGRESS NOTES
9/12/2024    Kristie Franz is here today for worsening knee pain. Pt has undergone injection of the knee in the past with good resolution of symptoms. Pt is requesting a repeat injection.     KNEE Injection Procedure Note:    Large Joint Arthrocentesis: L knee  Date/Time: 9/12/2024 3:16 PM  Consent given by: patient  Site marked: site marked  Timeout: Immediately prior to procedure a time out was called to verify the correct patient, procedure, equipment, support staff and site/side marked as required   Supporting Documentation  Indications: pain and joint swelling   Procedure Details  Location: knee - L knee  Preparation: Patient was prepped and draped in the usual sterile fashion  Needle size: 22 G  Approach: anterolateral  Medications administered: 3 mL lidocaine PF 1% 1 %; 88 mg Hyaluronan 88 MG/4ML  Patient tolerance: patient tolerated the procedure well with no immediate complications    (3 mL of lidocaine was used for anesthetic purposes)     Xrays 3views left knee (ap,lateral, sunrise) were ordered and reviewed for evaluation of knee pain demonstrating moderate joint space narrowing and chondrocalcinosis.  Patient has bone-on-bone articulation to the patellofemoral compartment.  In comparison to previous films patient does not demonstrate significant arthritic progression.    At the conclusion of the injection I discussed the importance of continued quad strengthening exercises on a daily basis. I will see the patient back if the symptoms should fail to improve or worsen.    Lam Staley, APRN  9/12/2024

## 2024-09-18 RX ORDER — LOSARTAN POTASSIUM 25 MG/1
25 TABLET ORAL DAILY
Qty: 90 TABLET | Refills: 1 | OUTPATIENT
Start: 2024-09-18

## 2024-10-14 RX ORDER — ROSUVASTATIN CALCIUM 10 MG/1
10 TABLET, COATED ORAL DAILY
Qty: 90 TABLET | Refills: 1 | Status: SHIPPED | OUTPATIENT
Start: 2024-10-14

## 2024-11-18 ENCOUNTER — TELEPHONE (OUTPATIENT)
Dept: INTERNAL MEDICINE | Facility: CLINIC | Age: 84
End: 2024-11-18

## 2024-11-18 NOTE — TELEPHONE ENCOUNTER
Caller: Kristie Franz    Relationship: Self    Best call back number: [phone]    What orders are you requesting (i.e. lab or imaging):   MAMMOGRAM  ULTRASOUND    In what timeframe would the patient need to come in: ASAP    Where will you receive your lab/imaging services: [facility]    Additional notes:   THEY CANNOT SCHEDULE HER UNTIL AFTER 24 AND BY THEN HER CURRENT ORDERS WILL BE .     PLEASE CALL TO CONFIRM .          na/Patient declined information

## 2024-11-19 DIAGNOSIS — R92.30 DENSE BREAST: ICD-10-CM

## 2024-11-19 DIAGNOSIS — Z12.31 ENCOUNTER FOR SCREENING MAMMOGRAM FOR MALIGNANT NEOPLASM OF BREAST: Primary | ICD-10-CM

## 2024-12-04 RX ORDER — LEVOTHYROXINE SODIUM 50 UG/1
50 TABLET ORAL DAILY
Qty: 90 TABLET | Refills: 3 | Status: SHIPPED | OUTPATIENT
Start: 2024-12-04

## 2024-12-06 ENCOUNTER — OFFICE VISIT (OUTPATIENT)
Dept: INTERNAL MEDICINE | Facility: CLINIC | Age: 84
End: 2024-12-06
Payer: MEDICARE

## 2024-12-06 VITALS
WEIGHT: 135.2 LBS | SYSTOLIC BLOOD PRESSURE: 110 MMHG | DIASTOLIC BLOOD PRESSURE: 66 MMHG | TEMPERATURE: 97.4 F | HEART RATE: 54 BPM | HEIGHT: 61 IN | OXYGEN SATURATION: 98 % | BODY MASS INDEX: 25.53 KG/M2

## 2024-12-06 DIAGNOSIS — J06.9 URI WITH COUGH AND CONGESTION: Primary | ICD-10-CM

## 2024-12-06 LAB
EXPIRATION DATE: NORMAL
FLUAV AG UPPER RESP QL IA.RAPID: NOT DETECTED
FLUBV AG UPPER RESP QL IA.RAPID: NOT DETECTED
INTERNAL CONTROL: NORMAL
Lab: NORMAL
SARS-COV-2 AG UPPER RESP QL IA.RAPID: NOT DETECTED

## 2024-12-06 PROCEDURE — 3078F DIAST BP <80 MM HG: CPT | Performed by: NURSE PRACTITIONER

## 2024-12-06 PROCEDURE — 87428 SARSCOV & INF VIR A&B AG IA: CPT | Performed by: NURSE PRACTITIONER

## 2024-12-06 PROCEDURE — 99213 OFFICE O/P EST LOW 20 MIN: CPT | Performed by: NURSE PRACTITIONER

## 2024-12-06 PROCEDURE — 1160F RVW MEDS BY RX/DR IN RCRD: CPT | Performed by: NURSE PRACTITIONER

## 2024-12-06 PROCEDURE — 1159F MED LIST DOCD IN RCRD: CPT | Performed by: NURSE PRACTITIONER

## 2024-12-06 PROCEDURE — 1125F AMNT PAIN NOTED PAIN PRSNT: CPT | Performed by: NURSE PRACTITIONER

## 2024-12-06 PROCEDURE — 3074F SYST BP LT 130 MM HG: CPT | Performed by: NURSE PRACTITIONER

## 2024-12-06 RX ORDER — BENZONATATE 100 MG/1
100 CAPSULE ORAL 3 TIMES DAILY PRN
Qty: 21 CAPSULE | Refills: 0 | Status: SHIPPED | OUTPATIENT
Start: 2024-12-06

## 2024-12-06 RX ORDER — AZITHROMYCIN 250 MG/1
TABLET, FILM COATED ORAL
Qty: 6 TABLET | Refills: 0 | Status: SHIPPED | OUTPATIENT
Start: 2024-12-06

## 2024-12-06 RX ORDER — CHOLECALCIFEROL (VITAMIN D3) 25 MCG
1000 TABLET ORAL DAILY
COMMUNITY

## 2024-12-06 NOTE — PROGRESS NOTES
Subjective   Kristie Franz is a 83 y.o. female.     Chief Complaint   Patient presents with    URI     Pt c/o cough, congestion, runny nose X 1 week.        History of Present Illness   She is here today with complaints of URI symptoms.  Symptoms started initially 1 week ago.  She is experiencing cough, congestion and rhinorrhea. She notes cough is now deep in her chest. She notes cough is worse in the afternoon and evening. Cough is now productive of yellow sputum. Cough is keeping her up at night. She notes postnasal drainage and rhinorrhea. Her ears feel full. Her chest hurts occasionally with persistent cough.  She denies any fever, chills, SOB or wheezing.   She initially was taking Dayquil and Nyquil. Symptoms initially improved and then became worse over the past few days.    The following portions of the patient's history were reviewed and updated as appropriate: allergies, current medications, past family history, past medical history, past social history, past surgical history, and problem list.    Review of Systems   Constitutional:  Positive for fatigue. Negative for chills and fever.   HENT:  Positive for congestion, postnasal drip, rhinorrhea, sinus pressure and sore throat. Negative for ear pain and trouble swallowing.    Respiratory:  Positive for cough. Negative for chest tightness, shortness of breath and wheezing.    Cardiovascular: Negative.    Neurological:  Positive for headache.       Objective   Physical Exam  Constitutional:       General: She is awake.      Appearance: She is well-developed. She is ill-appearing.   HENT:      Head: Normocephalic and atraumatic.      Right Ear: Hearing, ear canal and external ear normal. Tympanic membrane is erythematous.      Left Ear: Hearing, tympanic membrane, ear canal and external ear normal.      Nose: Rhinorrhea present. Rhinorrhea is purulent.      Right Turbinates: Enlarged.      Left Turbinates: Enlarged.      Right Sinus: No maxillary sinus  tenderness or frontal sinus tenderness.      Left Sinus: No maxillary sinus tenderness or frontal sinus tenderness.      Mouth/Throat:      Lips: Pink.      Mouth: Mucous membranes are moist. No injury or oral lesions.      Dentition: Normal dentition.      Tongue: No lesions. Tongue does not deviate from midline.      Palate: No mass and lesions.      Pharynx: Uvula midline. Postnasal drip present.   Neck:      Thyroid: No thyroid mass, thyromegaly or thyroid tenderness.      Vascular: No carotid bruit.      Trachea: Trachea normal.   Cardiovascular:      Rate and Rhythm: Normal rate and regular rhythm.      Chest Wall: PMI is not displaced.      Pulses:           Radial pulses are 2+ on the right side and 2+ on the left side.        Dorsalis pedis pulses are 2+ on the right side and 2+ on the left side.        Posterior tibial pulses are 2+ on the right side and 2+ on the left side.      Heart sounds: S1 normal and S2 normal.   Pulmonary:      Effort: Pulmonary effort is normal.      Breath sounds: Normal breath sounds. No decreased breath sounds, wheezing, rhonchi or rales.   Musculoskeletal:      Right lower leg: No edema.      Left lower leg: No edema.   Lymphadenopathy:      Head:      Right side of head: No submental, submandibular, tonsillar or occipital adenopathy.      Left side of head: No submental, submandibular, tonsillar or occipital adenopathy.      Cervical: No cervical adenopathy.   Skin:     General: Skin is warm and dry.      Capillary Refill: Capillary refill takes less than 2 seconds.      Nails: There is no clubbing.   Neurological:      Mental Status: She is alert and oriented to person, place, and time.   Psychiatric:         Attention and Perception: Attention normal.         Mood and Affect: Mood and affect normal.         Speech: Speech normal.         Behavior: Behavior normal. Behavior is cooperative.         Thought Content: Thought content normal.         Cognition and Memory:  Cognition normal.         Vitals:    12/06/24 1112   BP: 110/66   Pulse: 54   Temp: 97.4 °F (36.3 °C)   SpO2: 98%      Body mass index is 25.55 kg/m².    Assessment & Plan   Problems Addressed this Visit    None  Visit Diagnoses       URI with cough and congestion    -  Primary    Relevant Medications    azithromycin (Zithromax Z-Jimenez) 250 MG tablet    benzonatate (Tessalon Perles) 100 MG capsule    Other Relevant Orders    POCT SARS-CoV-2 Antigen RUBEN + Flu (Completed)          Diagnoses         Codes Comments    URI with cough and congestion    -  Primary ICD-10-CM: J06.9  ICD-9-CM: 465.9           1.  URI with cough and congestion-COVID and flu test negative today.  Discussed with her that symptoms are likely viral.  I am however concerned with her symptoms initially improving and then becoming worse as well as her age and comorbidities that she may be developing a secondary bacterial infection.  Will prescribe azithromycin to start if symptoms become worse.  Discussed with her watchful waiting over the next 48 hours.  If symptoms become more severe then start the azithromycin.  I have also sent in a prescription for Tessalon 100 mg 3 times daily as needed for cough.  She can also use Delsym as needed for cough.  Recommend hydrating well with fluids, vitamin C, vitamin D, hot tea with honey and lemon.  Notify if no improvement in symptoms.

## 2024-12-09 DIAGNOSIS — E03.9 ACQUIRED HYPOTHYROIDISM: ICD-10-CM

## 2024-12-09 DIAGNOSIS — E78.2 MIXED HYPERLIPIDEMIA: Primary | ICD-10-CM

## 2024-12-09 RX ORDER — ALENDRONATE SODIUM 70 MG/1
70 TABLET ORAL
Qty: 12 TABLET | Refills: 3 | Status: SHIPPED | OUTPATIENT
Start: 2024-12-09

## 2024-12-11 LAB
ALBUMIN SERPL-MCNC: 3.9 G/DL (ref 3.5–5.2)
ALBUMIN/GLOB SERPL: 1.6 G/DL
ALP SERPL-CCNC: 68 U/L (ref 39–117)
ALT SERPL-CCNC: 27 U/L (ref 1–33)
AST SERPL-CCNC: 34 U/L (ref 1–32)
BILIRUB SERPL-MCNC: 0.8 MG/DL (ref 0–1.2)
BUN SERPL-MCNC: 23 MG/DL (ref 8–23)
BUN/CREAT SERPL: 23.7 (ref 7–25)
CALCIUM SERPL-MCNC: 9.5 MG/DL (ref 8.6–10.5)
CHLORIDE SERPL-SCNC: 102 MMOL/L (ref 98–107)
CHOLEST SERPL-MCNC: 164 MG/DL (ref 0–200)
CO2 SERPL-SCNC: 26.9 MMOL/L (ref 22–29)
CREAT SERPL-MCNC: 0.97 MG/DL (ref 0.57–1)
EGFRCR SERPLBLD CKD-EPI 2021: 58.1 ML/MIN/1.73
GLOBULIN SER CALC-MCNC: 2.5 GM/DL
GLUCOSE SERPL-MCNC: 89 MG/DL (ref 65–99)
HDLC SERPL-MCNC: 79 MG/DL (ref 40–60)
LDLC SERPL CALC-MCNC: 72 MG/DL (ref 0–100)
LDLC/HDLC SERPL: 0.91 {RATIO}
POTASSIUM SERPL-SCNC: 4.1 MMOL/L (ref 3.5–5.2)
PROT SERPL-MCNC: 6.4 G/DL (ref 6–8.5)
SODIUM SERPL-SCNC: 139 MMOL/L (ref 136–145)
TRIGL SERPL-MCNC: 64 MG/DL (ref 0–150)
TSH SERPL DL<=0.005 MIU/L-ACNC: 2 UIU/ML (ref 0.45–4.5)
VLDLC SERPL CALC-MCNC: 13 MG/DL (ref 5–40)

## 2024-12-17 ENCOUNTER — OFFICE VISIT (OUTPATIENT)
Dept: INTERNAL MEDICINE | Facility: CLINIC | Age: 84
End: 2024-12-17
Payer: MEDICARE

## 2024-12-17 VITALS
HEART RATE: 61 BPM | OXYGEN SATURATION: 95 % | TEMPERATURE: 97.1 F | WEIGHT: 133 LBS | SYSTOLIC BLOOD PRESSURE: 120 MMHG | DIASTOLIC BLOOD PRESSURE: 64 MMHG | HEIGHT: 61 IN | BODY MASS INDEX: 25.11 KG/M2

## 2024-12-17 DIAGNOSIS — F41.8 SITUATIONAL ANXIETY: ICD-10-CM

## 2024-12-17 DIAGNOSIS — Z00.00 MEDICARE ANNUAL WELLNESS VISIT, SUBSEQUENT: Primary | ICD-10-CM

## 2024-12-17 DIAGNOSIS — I10 PRIMARY HYPERTENSION: ICD-10-CM

## 2024-12-17 DIAGNOSIS — E78.2 MIXED HYPERLIPIDEMIA: ICD-10-CM

## 2024-12-17 DIAGNOSIS — E03.9 ACQUIRED HYPOTHYROIDISM: ICD-10-CM

## 2024-12-17 PROCEDURE — 1126F AMNT PAIN NOTED NONE PRSNT: CPT | Performed by: FAMILY MEDICINE

## 2024-12-17 PROCEDURE — 1170F FXNL STATUS ASSESSED: CPT | Performed by: FAMILY MEDICINE

## 2024-12-17 PROCEDURE — G0439 PPPS, SUBSEQ VISIT: HCPCS | Performed by: FAMILY MEDICINE

## 2024-12-17 PROCEDURE — 99214 OFFICE O/P EST MOD 30 MIN: CPT | Performed by: FAMILY MEDICINE

## 2024-12-17 PROCEDURE — 3078F DIAST BP <80 MM HG: CPT | Performed by: FAMILY MEDICINE

## 2024-12-17 PROCEDURE — 3074F SYST BP LT 130 MM HG: CPT | Performed by: FAMILY MEDICINE

## 2024-12-17 PROCEDURE — 1160F RVW MEDS BY RX/DR IN RCRD: CPT | Performed by: FAMILY MEDICINE

## 2024-12-17 PROCEDURE — 1159F MED LIST DOCD IN RCRD: CPT | Performed by: FAMILY MEDICINE

## 2024-12-17 RX ORDER — CITALOPRAM HYDROBROMIDE 10 MG/1
10 TABLET ORAL DAILY
Qty: 90 TABLET | Refills: 1 | Status: SHIPPED | OUTPATIENT
Start: 2024-12-17

## 2024-12-17 RX ORDER — HYDROCHLOROTHIAZIDE 25 MG/1
25 TABLET ORAL DAILY
Qty: 90 TABLET | Refills: 1 | Status: SHIPPED | OUTPATIENT
Start: 2024-12-17

## 2024-12-17 RX ORDER — ATENOLOL 25 MG/1
25 TABLET ORAL
Qty: 90 TABLET | Refills: 1 | Status: SHIPPED | OUTPATIENT
Start: 2024-12-17

## 2024-12-17 NOTE — PROGRESS NOTES
Subjective   Kristie Franz is a 83 y.o. female.   Chief Complaint   Patient presents with    Medicare Wellness-subsequent    Hypertension    Hyperlipidemia    Anxiety       History of Present Illness     1.Hypertension-  patient is on HCTZ and 25 mg a day and atenolol at 25 mg a day.  She takes it every day. She has no side effects.  She has no chest pain, no shortness of breath, no lightheadedness, no palpitations.  Creatinine 0.97, GFR 58.1.  She follows up with cardiologist yearly. Last visit was in 7/2024- no change in medications recommended.     2. Situational anxiety-in December 2023 we weaned off citalopram. Her mood was getting worse and anxiety was gradually worse.  She was sad.  She cried a lot.  She restarted citalopram in April 2024.  She is on 10 mg a day.  She takes it every day.  Mood is normal.  No worries.  No side effects. No suicidal ideations or aggressive behaviors. PHQ-9 at 0, BISHNU-7 at 0.     3. Hyperlipidemia- on rosuvastatin 10 mg a day.  She takes it every day.  She has no side effects.  No muscle aches.  LDL 72 from 69 from 71 from 65, HDL 79.  LFTs normal.     4. Hypothyroidism-she has no history of thyroid surgery.  She is on levothyroxine 50 mcg a day.  She takes it every day on empty stomach and does not eat for at least 30 minutes after taking it.  TSH is at 2.0.    Review of Systems   Respiratory:  Negative for shortness of breath.    Cardiovascular:  Negative for chest pain and palpitations.   Musculoskeletal:  Negative for myalgias.   Neurological:  Negative for light-headedness.   Psychiatric/Behavioral: Negative.  Negative for suicidal ideas.          Objective   Wt Readings from Last 3 Encounters:   12/17/24 60.3 kg (133 lb)   12/06/24 61.3 kg (135 lb 3.2 oz)   09/12/24 62.4 kg (137 lb 9.6 oz)      Vitals:    12/17/24 1413   BP: 120/64   Pulse: 61   Temp: 97.1 °F (36.2 °C)   SpO2: 95%     Temp Readings from Last 3 Encounters:   12/17/24 97.1 °F (36.2 °C)   12/06/24 97.4 °F (36.3  °C) (Oral)   09/12/24 97.9 °F (36.6 °C) (Temporal)     BP Readings from Last 3 Encounters:   12/17/24 120/64   12/06/24 110/66   07/22/24 104/68     Pulse Readings from Last 3 Encounters:   12/17/24 61   12/06/24 54   07/22/24 53     Body mass index is 25.14 kg/m².    Physical Exam  Constitutional:       Appearance: She is well-developed.   Neck:      Vascular: No carotid bruit.   Cardiovascular:      Rate and Rhythm: Normal rate and regular rhythm.      Heart sounds: Normal heart sounds.   Pulmonary:      Effort: Pulmonary effort is normal.      Breath sounds: Normal breath sounds.   Skin:     General: Skin is warm and dry.   Neurological:      Mental Status: She is alert and oriented to person, place, and time.   Psychiatric:         Behavior: Behavior normal.         Assessment & Plan   Diagnoses and all orders for this visit:    1. Medicare annual wellness visit, subsequent (Primary)    2. Mixed hyperlipidemia  Assessment & Plan:                3. Primary hypertension  Assessment & Plan:               4. Situational anxiety    5. Acquired hypothyroidism  Assessment & Plan:             Other orders  -     citalopram (CeleXA) 10 MG tablet; Take 1 tablet by mouth Daily.  Dispense: 90 tablet; Refill: 1  -     hydroCHLOROthiazide 25 MG tablet; Take 1 tablet by mouth Daily.  Dispense: 90 tablet; Refill: 1  -     atenolol (TENORMIN) 25 MG tablet; Take 1 tablet by mouth Daily Before Supper.  Dispense: 90 tablet; Refill: 1        Hypertension-continue current treatment.  Follow-up in 6 months.    Hyperlipidemia-continue current treatment.  Follow-up in 6 months.    Anxiety-continue current treatment.  Follow-up in 6 months.    Hypothyroidism-continue current treatment.  Follow-up in 12 months.
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
## 2024-12-17 NOTE — PATIENT INSTRUCTIONS
Medicare Wellness  Personal Prevention Plan of Service     Date of Office Visit:    Encounter Provider:  Mariia Trujillo MD  Place of Service:  [facility]  Patient Name: Kristie Franz  :  1940    As part of the Medicare Wellness portion of your visit today, we are providing you with this personalized preventive plan of services (PPPS). This plan is based upon recommendations of the [place] Preventive Services Task Force (USPSTF) and the Advisory Committee on Immunization Practices (ACIP).    This lists the preventive care services that should be considered, and provides dates of when you are due. Items listed as completed are up-to-date and do not require any further intervention.    Health Maintenance   Topic Date Due    ANNUAL WELLNESS VISIT  2024    COLORECTAL CANCER SCREENING  2025    COVID-19 Vaccine (2024- season) 2024 (Originally 2024)    RSV Vaccine - Adults (1 - 1-dose 75+ series) 2027 (Originally 2015)    TDAP/TD VACCINES (2 - Td or Tdap) 10/04/2025    LIPID PANEL  12/10/2025    DXA SCAN  12/15/2025    BMI FOLLOWUP  2025    INFLUENZA VACCINE  Completed    Pneumococcal Vaccine 65+  Completed    ZOSTER VACCINE  Completed    MAMMOGRAM  Discontinued       No orders of the defined types were placed in this encounter.      Return in about 6 months (around 2025).

## 2025-01-22 DIAGNOSIS — R92.8 ABNORMAL MAMMOGRAM: Primary | ICD-10-CM

## 2025-01-23 ENCOUNTER — HOSPITAL ENCOUNTER (OUTPATIENT)
Dept: MAMMOGRAPHY | Facility: HOSPITAL | Age: 85
Discharge: HOME | End: 2025-01-23
Payer: MEDICARE

## 2025-01-23 ENCOUNTER — HOSPITAL ENCOUNTER (OUTPATIENT)
Dept: ULTRASOUND IMAGING | Facility: HOSPITAL | Age: 85
Discharge: HOME | End: 2025-01-23
Payer: MEDICARE

## 2025-01-23 DIAGNOSIS — R92.8 ABNORMAL MAMMOGRAM: ICD-10-CM

## 2025-01-23 DIAGNOSIS — Z12.31 ENCOUNTER FOR SCREENING MAMMOGRAM FOR MALIGNANT NEOPLASM OF BREAST: ICD-10-CM

## 2025-01-23 PROCEDURE — 76641 ULTRASOUND BREAST COMPLETE: CPT

## 2025-01-23 PROCEDURE — 77063 BREAST TOMOSYNTHESIS BI: CPT

## 2025-01-23 PROCEDURE — 77067 SCR MAMMO BI INCL CAD: CPT

## 2025-03-06 ENCOUNTER — TELEPHONE (OUTPATIENT)
Dept: CARDIOLOGY | Facility: CLINIC | Age: 85
End: 2025-03-06
Payer: MEDICARE

## 2025-03-06 NOTE — TELEPHONE ENCOUNTER
This patient was last seen by Nelson in July of last year, she put in a referral to follow up with cardiology so I called to make sure if it needed to be with EP or not. She stated that her PCP wanted her to have some carotid artery testing done, and she had been seeing some ads from [employer] about having that done. She wanted to know if you all had a good reference of where she can get that done, or if she should get it done through the [employer] company.

## 2025-03-09 NOTE — TELEPHONE ENCOUNTER
Scheduled new patient apt with Jama Vanegas APRN Friday 1/27/23 9:40 arrival 9:10-9:15. Confirmed w/patient.    Elbow fracture

## 2025-03-21 ENCOUNTER — OFFICE VISIT (OUTPATIENT)
Dept: INTERNAL MEDICINE | Facility: CLINIC | Age: 85
End: 2025-03-21
Payer: MEDICARE

## 2025-03-21 VITALS
WEIGHT: 133.2 LBS | BODY MASS INDEX: 25.15 KG/M2 | OXYGEN SATURATION: 97 % | SYSTOLIC BLOOD PRESSURE: 90 MMHG | DIASTOLIC BLOOD PRESSURE: 66 MMHG | TEMPERATURE: 97.3 F | HEIGHT: 61 IN | HEART RATE: 59 BPM

## 2025-03-21 DIAGNOSIS — M26.622 ARTHRALGIA OF LEFT TEMPOROMANDIBULAR JOINT: Primary | ICD-10-CM

## 2025-03-21 NOTE — PROGRESS NOTES
Subjective   Kristie Franz is a 84 y.o. female.   Chief Complaint   Patient presents with    Jaw Pain     Started Tuesday, saw dentist Wednesday; left side of jaw       History of Present Illness     Left sided jaw pain-it started 3 days ago.  Throbbing pain.  Patient was evaluated by dentist 2 days ago.  She had x-rays done.  She was diagnosed with TMJ.  She was advised to use warm compresses and exercise.  They discussed retainer, but it was expensive and she decided to hold with it for now.  Last night she started wearing her oral retainer.  She also started using Advil.  She used 1 tablet twice a day yesterday.    It is a little better today.      Review of Systems   Constitutional:  Negative for chills, diaphoresis, fatigue and fever.   HENT:  Negative for congestion and sore throat.    Respiratory:  Negative for cough.    Cardiovascular:  Negative for chest pain.   Gastrointestinal:  Negative for abdominal pain, nausea and vomiting.   Genitourinary:  Negative for dysuria.   Musculoskeletal:  Negative for myalgias and neck pain.   Skin:  Negative for rash.   Neurological:  Negative for weakness, numbness and headaches.       Objective   Wt Readings from Last 3 Encounters:   03/21/25 60.4 kg (133 lb 3.2 oz)   12/17/24 60.3 kg (133 lb)   12/06/24 61.3 kg (135 lb 3.2 oz)      Vitals:    03/21/25 1020   BP: 90/66   Pulse: 59   Temp: 97.3 °F (36.3 °C)   SpO2: 97%     Temp Readings from Last 3 Encounters:   03/21/25 97.3 °F (36.3 °C)   12/17/24 97.1 °F (36.2 °C)   12/06/24 97.4 °F (36.3 °C) (Oral)     BP Readings from Last 3 Encounters:   03/21/25 90/66   12/17/24 120/64   12/06/24 110/66     Pulse Readings from Last 3 Encounters:   03/21/25 59   12/17/24 61   12/06/24 54     Body mass index is 25.18 kg/m².    Physical Exam  HENT:      Head:      Comments: TTP over left TMJ.  No clicking.  No TTP over parotids.  No redness.  No swelling.     Right Ear: Tympanic membrane, ear canal and external ear normal.      Left  Ear: Tympanic membrane, ear canal and external ear normal.      Mouth/Throat:      Mouth: No oral lesions.      Pharynx: No posterior oropharyngeal erythema.   Cardiovascular:      Rate and Rhythm: Normal rate and regular rhythm.   Pulmonary:      Effort: Pulmonary effort is normal.      Breath sounds: Normal breath sounds. No wheezing, rhonchi or rales.   Lymphadenopathy:      Cervical: No cervical adenopathy.     Assessment & Plan   Diagnoses and all orders for this visit:    1. Arthralgia of left temporomandibular joint (Primary)        Left-sided TMJ-continue current treatment.  Use Tylenol as needed for pain.  Risks of Advil including GI bleeding, kidney failure and cardiovascular risks discussed.  Follow-up with your dentist as scheduled.

## 2025-04-14 RX ORDER — ROSUVASTATIN CALCIUM 10 MG/1
10 TABLET, COATED ORAL DAILY
Qty: 90 TABLET | Refills: 1 | Status: SHIPPED | OUTPATIENT
Start: 2025-04-14

## 2025-05-29 NOTE — TELEPHONE ENCOUNTER
Advised to pt that recommendations are 10 people or less, that this would have to a decision she would have to make.  
PATIENT CALLED AND WANTS TO KNOW WHAT IS THE BEST WAY TO HANDLE THIS. HER GRANDDAUGHTER'S WED[place]ING IS 8/29/2020 AND SHE IS WONDERING IF DR. JEFFRIES HAS ANY ADVICE AS TO GO TO THE [place]. AT THIS TIME THERE WILL BE ABOUT 125 PEOPLE ALLOWED AT THE VENUE.  PLEASE CALL [phone]  AND ADVISE AS TO PRECAUTIONS PATIENT SHOULD TAKE.  
CAD (coronary artery disease)

## 2025-06-09 DIAGNOSIS — E03.9 ACQUIRED HYPOTHYROIDISM: ICD-10-CM

## 2025-06-09 DIAGNOSIS — E78.2 MIXED HYPERLIPIDEMIA: ICD-10-CM

## 2025-06-11 LAB
ALBUMIN SERPL-MCNC: 4.1 G/DL (ref 3.5–5.2)
ALBUMIN/GLOB SERPL: 1.8 G/DL
ALP SERPL-CCNC: 57 U/L (ref 39–117)
ALT SERPL-CCNC: 18 U/L (ref 1–33)
AST SERPL-CCNC: 29 U/L (ref 1–32)
BILIRUB SERPL-MCNC: 0.9 MG/DL (ref 0–1.2)
BUN SERPL-MCNC: 16 MG/DL (ref 8–23)
BUN/CREAT SERPL: 19.8 (ref 7–25)
CALCIUM SERPL-MCNC: 9.6 MG/DL (ref 8.6–10.5)
CHLORIDE SERPL-SCNC: 100 MMOL/L (ref 98–107)
CHOLEST SERPL-MCNC: 157 MG/DL (ref 0–200)
CO2 SERPL-SCNC: 29.6 MMOL/L (ref 22–29)
CREAT SERPL-MCNC: 0.81 MG/DL (ref 0.57–1)
EGFRCR SERPLBLD CKD-EPI 2021: 71.7 ML/MIN/1.73
GLOBULIN SER CALC-MCNC: 2.3 GM/DL
GLUCOSE SERPL-MCNC: 89 MG/DL (ref 65–99)
HDLC SERPL-MCNC: 85 MG/DL (ref 40–60)
LDLC SERPL CALC-MCNC: 60 MG/DL (ref 0–100)
LDLC/HDLC SERPL: 0.7 {RATIO}
POTASSIUM SERPL-SCNC: 4.4 MMOL/L (ref 3.5–5.2)
PROT SERPL-MCNC: 6.4 G/DL (ref 6–8.5)
SODIUM SERPL-SCNC: 140 MMOL/L (ref 136–145)
TRIGL SERPL-MCNC: 62 MG/DL (ref 0–150)
TSH SERPL DL<=0.005 MIU/L-ACNC: 1.88 UIU/ML (ref 0.45–4.5)
VLDLC SERPL CALC-MCNC: 12 MG/DL (ref 5–40)

## 2025-06-16 RX ORDER — HYDROCHLOROTHIAZIDE 25 MG/1
25 TABLET ORAL DAILY
Qty: 90 TABLET | Refills: 1 | Status: SHIPPED | OUTPATIENT
Start: 2025-06-16

## 2025-06-17 ENCOUNTER — OFFICE VISIT (OUTPATIENT)
Dept: INTERNAL MEDICINE | Facility: CLINIC | Age: 85
End: 2025-06-17
Payer: MEDICARE

## 2025-06-17 VITALS
TEMPERATURE: 98 F | WEIGHT: 135 LBS | OXYGEN SATURATION: 97 % | HEART RATE: 66 BPM | DIASTOLIC BLOOD PRESSURE: 60 MMHG | BODY MASS INDEX: 25.49 KG/M2 | HEIGHT: 61 IN | SYSTOLIC BLOOD PRESSURE: 122 MMHG

## 2025-06-17 DIAGNOSIS — F41.8 SITUATIONAL ANXIETY: ICD-10-CM

## 2025-06-17 DIAGNOSIS — E78.2 MIXED HYPERLIPIDEMIA: ICD-10-CM

## 2025-06-17 DIAGNOSIS — I10 PRIMARY HYPERTENSION: Primary | ICD-10-CM

## 2025-06-17 DIAGNOSIS — M81.0 AGE-RELATED OSTEOPOROSIS WITHOUT CURRENT PATHOLOGICAL FRACTURE: ICD-10-CM

## 2025-06-17 PROCEDURE — 3078F DIAST BP <80 MM HG: CPT | Performed by: FAMILY MEDICINE

## 2025-06-17 PROCEDURE — 1126F AMNT PAIN NOTED NONE PRSNT: CPT | Performed by: FAMILY MEDICINE

## 2025-06-17 PROCEDURE — G2211 COMPLEX E/M VISIT ADD ON: HCPCS | Performed by: FAMILY MEDICINE

## 2025-06-17 PROCEDURE — 3074F SYST BP LT 130 MM HG: CPT | Performed by: FAMILY MEDICINE

## 2025-06-17 PROCEDURE — 1159F MED LIST DOCD IN RCRD: CPT | Performed by: FAMILY MEDICINE

## 2025-06-17 PROCEDURE — 99214 OFFICE O/P EST MOD 30 MIN: CPT | Performed by: FAMILY MEDICINE

## 2025-06-17 PROCEDURE — 1160F RVW MEDS BY RX/DR IN RCRD: CPT | Performed by: FAMILY MEDICINE

## 2025-06-17 RX ORDER — CITALOPRAM HYDROBROMIDE 10 MG/1
10 TABLET ORAL DAILY
Qty: 90 TABLET | Refills: 1 | Status: SHIPPED | OUTPATIENT
Start: 2025-06-17

## 2025-06-17 RX ORDER — LEVOTHYROXINE SODIUM 50 UG/1
50 TABLET ORAL DAILY
Qty: 90 TABLET | Refills: 3 | Status: SHIPPED | OUTPATIENT
Start: 2025-06-17

## 2025-06-17 NOTE — PROGRESS NOTES
Subjective   Kristie Franz is a 84 y.o. female.   Chief Complaint   Patient presents with    Hyperlipidemia    Hypertension    Anxiety       History of Present Illness     Hypertension-  patient is on HCTZ and 25 mg a day and atenolol at 25 mg a day.  She takes it every day. She has no side effects.  She has no chest pain, no shortness of breath, no palpitations.  Occasionally she gets lightheaded when she gets up faster. Creatinine 0.81, GFR 71.7.  She exercises regularly.  She goes to classes 3-4 times a week.  She does strength, stretching and cardio.  She follows up with cardiologist yearly. Last visit was in 7/2024- no change in medications recommended.     Situational anxiety-in December 2023 we weaned off citalopram. Her mood was getting worse and anxiety was gradually worse.  She was sad.  She cried a lot.  She restarted citalopram in April 2024.  She is on 10 mg a day.  She takes it every day.  Mood is good most of the time.  No excessive worries.  No side effects. No suicidal ideations or aggressive behaviors. PHQ-9 at 0, BISHNU-7 at 0.     Hyperlipidemia- on rosuvastatin 10 mg a day.  She takes it every day.  She has no side effects.  No muscle aches.  LDL 60 from 72 from 69 from 71 from 65, HDL 85.  LFTs normal.    Review of Systems   Respiratory: Negative.     Cardiovascular: Negative.    Gastrointestinal:  Negative for blood in stool.         Objective   Wt Readings from Last 3 Encounters:   06/17/25 61.2 kg (135 lb)   03/21/25 60.4 kg (133 lb 3.2 oz)   12/17/24 60.3 kg (133 lb)      Vitals:    06/17/25 1108   BP: 122/60   Pulse: 66   Temp: 98 °F (36.7 °C)   SpO2: 97%     Temp Readings from Last 3 Encounters:   06/17/25 98 °F (36.7 °C)   03/21/25 97.3 °F (36.3 °C)   12/17/24 97.1 °F (36.2 °C)     BP Readings from Last 3 Encounters:   06/17/25 122/60   03/21/25 90/66   12/17/24 120/64     Pulse Readings from Last 3 Encounters:   06/17/25 66   03/21/25 59   12/17/24 61     Body mass index is 25.52  kg/m².    Physical Exam  Constitutional:       Appearance: She is well-developed.   Neck:      Vascular: No carotid bruit.   Cardiovascular:      Rate and Rhythm: Normal rate and regular rhythm.      Heart sounds: Normal heart sounds.   Pulmonary:      Effort: Pulmonary effort is normal.      Breath sounds: Normal breath sounds.   Skin:     General: Skin is warm and dry.   Neurological:      Mental Status: She is alert.   Psychiatric:         Behavior: Behavior normal.         Assessment & Plan   Diagnoses and all orders for this visit:    1. Primary hypertension (Primary)  -     Comprehensive Metabolic Panel; Future    2. Age-related osteoporosis without current pathological fracture  -     Comprehensive Metabolic Panel; Future  -     Vitamin D,25-Hydroxy; Future    3. Mixed hyperlipidemia  -     Comprehensive Metabolic Panel; Future  -     Lipid Panel With LDL / HDL Ratio; Future    4. Situational anxiety    Other orders  -     citalopram (CeleXA) 10 MG tablet; Take 1 tablet by mouth Daily.  Dispense: 90 tablet; Refill: 1  -     levothyroxine (SYNTHROID, LEVOTHROID) 50 MCG tablet; Take 1 tablet by mouth Daily.  Dispense: 90 tablet; Refill: 3        Hypertension-continue current treatment.  Follow-up in 6 months.    Hyperlipidemia-continue current treatment.  Follow-up in 6 months.    Anxiety-continue current treatment.  Follow-up in 6 months

## 2025-07-01 ENCOUNTER — HOSPITAL ENCOUNTER (OUTPATIENT)
Dept: GENERAL RADIOLOGY | Facility: HOSPITAL | Age: 85
Discharge: HOME | End: 2025-07-01
Payer: MEDICARE

## 2025-07-01 ENCOUNTER — OFFICE VISIT (OUTPATIENT)
Dept: INTERNAL MEDICINE | Facility: CLINIC | Age: 85
End: 2025-07-01
Payer: MEDICARE

## 2025-07-01 VITALS
WEIGHT: 135 LBS | DIASTOLIC BLOOD PRESSURE: 70 MMHG | OXYGEN SATURATION: 94 % | HEART RATE: 94 BPM | SYSTOLIC BLOOD PRESSURE: 120 MMHG | TEMPERATURE: 97.5 F | BODY MASS INDEX: 25.49 KG/M2 | HEIGHT: 61 IN

## 2025-07-01 DIAGNOSIS — M54.9 ACUTE UPPER BACK PAIN: Primary | ICD-10-CM

## 2025-07-01 PROCEDURE — 72072 X-RAY EXAM THORAC SPINE 3VWS: CPT

## 2025-07-01 PROCEDURE — 72040 X-RAY EXAM NECK SPINE 2-3 VW: CPT

## 2025-07-01 RX ORDER — MELOXICAM 15 MG/1
15 TABLET ORAL DAILY
Qty: 21 TABLET | Refills: 0 | Status: SHIPPED | OUTPATIENT
Start: 2025-07-01

## 2025-07-01 NOTE — PROGRESS NOTES
Subjective   Kristie Franz is a 84 y.o. female.   Chief Complaint   Patient presents with    Back Pain     6 days        History of Present Illness     Back pain-started 5 days ago.  Patient did yard work and picked something heavier and had mild upper back pain after that. Then she went to exercise classes.  She took 2 classes that day.  1 intense class 45 minutes and then yoga class.  At the end of the day she felt worse.  She had upper bilateral back pain.  Next morning she had a hard time to get up from bed and had to call her daughter to help her.  She continues to have constant, sharp, sometimes dull upper back pain.  Intensity 7-10 out of 10.  It is radiating down both shoulders.  There is no numbness or tingling associated.  No chest pain, no palpitations.  Pain is worse when moving from laying to sitting position.  She tried Advil and heating pads and they helped some.  Overall pain is getting a little better.    Review of Systems   Respiratory: Negative.     Cardiovascular: Negative.    Musculoskeletal:  Positive for back pain and neck pain.   Neurological:  Negative for numbness.         Objective   Wt Readings from Last 3 Encounters:   07/01/25 61.2 kg (135 lb)   06/17/25 61.2 kg (135 lb)   03/21/25 60.4 kg (133 lb 3.2 oz)      Vitals:    07/01/25 0910   BP: 120/70   Pulse: 94   Temp: 97.5 °F (36.4 °C)   SpO2: 94%     Temp Readings from Last 3 Encounters:   07/01/25 97.5 °F (36.4 °C)   06/17/25 98 °F (36.7 °C)   03/21/25 97.3 °F (36.3 °C)     BP Readings from Last 3 Encounters:   07/01/25 120/70   06/17/25 122/60   03/21/25 90/66     Pulse Readings from Last 3 Encounters:   07/01/25 94   06/17/25 66   03/21/25 59     Body mass index is 25.52 kg/m².    Physical Exam  Constitutional:       Appearance: She is well-developed.   Cardiovascular:      Rate and Rhythm: Normal rate and regular rhythm.      Heart sounds: Normal heart sounds.   Pulmonary:      Effort: Pulmonary effort is normal.      Breath sounds:  Normal breath sounds.   Musculoskeletal:      Comments: No TTP over C-spine/Th spine or paraspinal muscles.  MM strength 5/5 BL x UE.  DTR 2+ BL x UE.     Skin:     General: Skin is warm and dry.      Findings: No rash.   Neurological:      Mental Status: She is alert.         Assessment & Plan   Diagnoses and all orders for this visit:    1. Acute upper back pain (Primary)  -     XR Spine Cervical 2 or 3 View  -     XR spine thoracic 3 vw    Other orders  -     meloxicam (Mobic) 15 MG tablet; Take 1 tablet by mouth Daily.  Dispense: 21 tablet; Refill: 0        Upper back pain-we are checking x-rays.  Activities modification discussed.  We are starting meloxicam short-term.  Side effects including GI bleeding, kidney failure and cardiovascular risks discussed.  No other NSAIDs while on meloxicam including Aleve, Advil etc.  Okay to use extra Tylenol.  Patient can use 2 tablets of 500 mg every 8 hours if needed.  If no significant improvement in 3 weeks patient will call me and we will refer her to PT.  If worsening she will return to the office.

## 2025-07-21 RX ORDER — CITALOPRAM HYDROBROMIDE 10 MG/1
10 TABLET ORAL DAILY
Qty: 90 TABLET | Refills: 1 | Status: SHIPPED | OUTPATIENT
Start: 2025-07-21

## 2025-07-28 RX ORDER — ATENOLOL 25 MG/1
25 TABLET ORAL
Qty: 90 TABLET | Refills: 1 | Status: SHIPPED | OUTPATIENT
Start: 2025-07-28

## (undated) DEVICE — BNDG ELAS ELITE V/CLOSE 6IN 5YD LF STRL

## (undated) DEVICE — TRAP FLD MINIVAC MEGADYNE 100ML

## (undated) DEVICE — ANTIBACTERIAL UNDYED BRAIDED (POLYGLACTIN 910), SYNTHETIC ABSORBABLE SUTURE: Brand: COATED VICRYL

## (undated) DEVICE — 3M™ IOBAN™ 2 ANTIMICROBIAL INCISE DRAPE 6640EZ: Brand: IOBAN™ 2

## (undated) DEVICE — NDL SPINE 22G 31/2IN BLK

## (undated) DEVICE — PREP SOL POVIDONE/IODINE BT 4OZ

## (undated) DEVICE — GLV SURG SENSICARE PI PF LF 7 GRN STRL

## (undated) DEVICE — PK KN TOTL 40

## (undated) DEVICE — MAT FLR ABSORBENT LG 4FT 10 2.5FT

## (undated) DEVICE — GLV SURG SENSICARE W/ALOE PF LF 8 STRL

## (undated) DEVICE — NDL HYPO ECLPS SFTY 22G 1 1/2IN

## (undated) DEVICE — STPLR SKIN VISISTAT WD 35CT

## (undated) DEVICE — SUT SILK 2/0 SH 30IN K833H

## (undated) DEVICE — APPL CHLORAPREP HI/LITE 26ML ORNG

## (undated) DEVICE — 3M™ IOBAN™ 2 ANTIMICROBIAL INCISE DRAPE 6650EZ: Brand: IOBAN™ 2

## (undated) DEVICE — KT DRN EVAC WND PVC PCH WTROC RND 10F400

## (undated) DEVICE — GLV SURG SENSICARE PI MIC PF SZ6.5 LF STRL

## (undated) DEVICE — GLV SURG SENSICARE W/ALOE PF LF 7.5 STRL

## (undated) DEVICE — MEDI-VAC YANKAUER SUCTION HANDLE W/BULBOUS TIP: Brand: CARDINAL HEALTH

## (undated) DEVICE — SUT MNCRYL PLS ANTIB UD 4/0 PS2 18IN

## (undated) DEVICE — PK CHST BRST 40

## (undated) DEVICE — SUT VIC 1 CT1 36IN J947H

## (undated) DEVICE — SUT VIC 0 CT1 36IN J946H

## (undated) DEVICE — ADHS SKIN SURG TISS VISC PREMIERPRO EXOFIN HI/VISC FAST/DRY

## (undated) DEVICE — ENCORE® LATEX ORTHO SIZE 7.5, STERILE LATEX POWDER-FREE SURGICAL GLOVE: Brand: ENCORE

## (undated) DEVICE — GLV SURG SENSICARE MICRO PF LF 7 STRL

## (undated) DEVICE — TBG PENCL TELESCP MEGADYNE SMOKE EVAC 10FT

## (undated) DEVICE — PREMIUM WET SKIN PREP TRAY: Brand: MEDLINE INDUSTRIES, INC.

## (undated) DEVICE — APPL DURAPREP IODOPHOR APL 26ML

## (undated) DEVICE — SYR LL TP 10ML STRL

## (undated) DEVICE — ELECTRD BLD EZ CLN MOD XLNG 2.75IN

## (undated) DEVICE — SOL NACL 0.9PCT 100ML SGL

## (undated) DEVICE — DUAL CUT SAGITTAL BLADE

## (undated) DEVICE — UNDERCAST PADDING: Brand: DEROYAL

## (undated) DEVICE — LEGGINGS, PAIR, 31X48, STERILE: Brand: MEDLINE

## (undated) DEVICE — GLV SURG SENSICARE POLYISPRN W/ALOE PF LF 6.5 GRN STRL

## (undated) DEVICE — PICO 7 10CM X 30CM: Brand: PICO™ 7

## (undated) DEVICE — GOWN,SIRUS,NON REINFRCD,LARGE,SET IN SL: Brand: MEDLINE